# Patient Record
Sex: MALE | Race: WHITE | NOT HISPANIC OR LATINO | Employment: UNEMPLOYED | URBAN - METROPOLITAN AREA
[De-identification: names, ages, dates, MRNs, and addresses within clinical notes are randomized per-mention and may not be internally consistent; named-entity substitution may affect disease eponyms.]

---

## 2017-05-24 ENCOUNTER — HOSPITAL ENCOUNTER (EMERGENCY)
Facility: HOSPITAL | Age: 72
Discharge: HOME | End: 2017-05-24
Attending: EMERGENCY MEDICINE
Payer: MEDICARE

## 2017-05-24 VITALS
OXYGEN SATURATION: 95 % | HEART RATE: 74 BPM | DIASTOLIC BLOOD PRESSURE: 69 MMHG | TEMPERATURE: 97 F | WEIGHT: 240.88 LBS | RESPIRATION RATE: 20 BRPM | SYSTOLIC BLOOD PRESSURE: 144 MMHG

## 2017-05-24 DIAGNOSIS — J44.1 COPD WITH EXACERBATION: Primary | ICD-10-CM

## 2017-05-24 DIAGNOSIS — R06.02 SOB (SHORTNESS OF BREATH): ICD-10-CM

## 2017-05-24 LAB
ANION GAP SERPL CALC-SCNC: 11 MMOL/L
BASOPHILS # BLD AUTO: 0.1 K/UL
BASOPHILS NFR BLD: 2.1 %
BNP SERPL-MCNC: 33 PG/ML
BUN SERPL-MCNC: 12 MG/DL
CALCIUM SERPL-MCNC: 9.3 MG/DL
CHLORIDE SERPL-SCNC: 97 MMOL/L
CO2 SERPL-SCNC: 31 MMOL/L
CREAT SERPL-MCNC: 1.1 MG/DL
DIFFERENTIAL METHOD: ABNORMAL
EOSINOPHIL # BLD AUTO: 0.3 K/UL
EOSINOPHIL NFR BLD: 5.1 %
ERYTHROCYTE [DISTWIDTH] IN BLOOD BY AUTOMATED COUNT: 14.1 %
EST. GFR  (AFRICAN AMERICAN): >60 ML/MIN/1.73 M^2
EST. GFR  (NON AFRICAN AMERICAN): >60 ML/MIN/1.73 M^2
GLUCOSE SERPL-MCNC: 130 MG/DL
HCT VFR BLD AUTO: 46 %
HGB BLD-MCNC: 15.8 G/DL
LYMPHOCYTES # BLD AUTO: 1.4 K/UL
LYMPHOCYTES NFR BLD: 23.3 %
MCH RBC QN AUTO: 31.3 PG
MCHC RBC AUTO-ENTMCNC: 34.3 %
MCV RBC AUTO: 91 FL
MONOCYTES # BLD AUTO: 0.6 K/UL
MONOCYTES NFR BLD: 10.1 %
NEUTROPHILS # BLD AUTO: 3.8 K/UL
NEUTROPHILS NFR BLD: 59.4 %
PLATELET # BLD AUTO: 220 K/UL
PMV BLD AUTO: 7.8 FL
POTASSIUM SERPL-SCNC: 4.1 MMOL/L
RBC # BLD AUTO: 5.05 M/UL
SODIUM SERPL-SCNC: 139 MMOL/L
WBC # BLD AUTO: 6.2 K/UL

## 2017-05-24 PROCEDURE — 25000242 PHARM REV CODE 250 ALT 637 W/ HCPCS: Performed by: EMERGENCY MEDICINE

## 2017-05-24 PROCEDURE — 27000221 HC OXYGEN, UP TO 24 HOURS

## 2017-05-24 PROCEDURE — 94761 N-INVAS EAR/PLS OXIMETRY MLT: CPT

## 2017-05-24 PROCEDURE — 99284 EMERGENCY DEPT VISIT MOD MDM: CPT | Mod: 25

## 2017-05-24 PROCEDURE — 94640 AIRWAY INHALATION TREATMENT: CPT

## 2017-05-24 PROCEDURE — 36415 COLL VENOUS BLD VENIPUNCTURE: CPT

## 2017-05-24 PROCEDURE — 83880 ASSAY OF NATRIURETIC PEPTIDE: CPT

## 2017-05-24 PROCEDURE — 80048 BASIC METABOLIC PNL TOTAL CA: CPT

## 2017-05-24 PROCEDURE — 85025 COMPLETE CBC W/AUTO DIFF WBC: CPT

## 2017-05-24 RX ORDER — AMLODIPINE BESYLATE 10 MG/1
10 TABLET ORAL DAILY
COMMUNITY

## 2017-05-24 RX ORDER — IPRATROPIUM BROMIDE AND ALBUTEROL SULFATE 2.5; .5 MG/3ML; MG/3ML
3 SOLUTION RESPIRATORY (INHALATION) EVERY 6 HOURS PRN
Qty: 25 VIAL | Refills: 2 | Status: SHIPPED | OUTPATIENT
Start: 2017-05-24 | End: 2019-06-07

## 2017-05-24 RX ORDER — IPRATROPIUM BROMIDE AND ALBUTEROL SULFATE 2.5; .5 MG/3ML; MG/3ML
3 SOLUTION RESPIRATORY (INHALATION)
Status: COMPLETED | OUTPATIENT
Start: 2017-05-24 | End: 2017-05-24

## 2017-05-24 RX ORDER — SERTRALINE HYDROCHLORIDE 50 MG/1
50 TABLET, FILM COATED ORAL DAILY
COMMUNITY

## 2017-05-24 RX ORDER — MEMANTINE HYDROCHLORIDE 5 MG/1
5 TABLET ORAL 2 TIMES DAILY
COMMUNITY

## 2017-05-24 RX ORDER — QUETIAPINE FUMARATE 25 MG/1
25 TABLET, FILM COATED ORAL NIGHTLY
COMMUNITY

## 2017-05-24 RX ORDER — DONEPEZIL HYDROCHLORIDE 10 MG/1
10 TABLET, FILM COATED ORAL NIGHTLY
COMMUNITY

## 2017-05-24 RX ORDER — TRAZODONE HYDROCHLORIDE 50 MG/1
50 TABLET ORAL NIGHTLY
COMMUNITY

## 2017-05-24 RX ORDER — FOLIC ACID 1 MG/1
1 TABLET ORAL DAILY
COMMUNITY

## 2017-05-24 RX ORDER — MULTIVITAMIN
1 TABLET ORAL DAILY
COMMUNITY

## 2017-05-24 RX ORDER — TRAMADOL HYDROCHLORIDE 50 MG/1
50 TABLET ORAL EVERY 12 HOURS PRN
COMMUNITY

## 2017-05-24 RX ORDER — TRIAMCINOLONE ACETONIDE 1 MG/G
CREAM TOPICAL 2 TIMES DAILY
COMMUNITY

## 2017-05-24 RX ORDER — LISINOPRIL 40 MG/1
40 TABLET ORAL DAILY
COMMUNITY

## 2017-05-24 RX ORDER — METOPROLOL TARTRATE 25 MG/1
25 TABLET, FILM COATED ORAL 2 TIMES DAILY
COMMUNITY

## 2017-05-24 RX ORDER — LANOLIN ALCOHOL/MO/W.PET/CERES
100 CREAM (GRAM) TOPICAL DAILY
COMMUNITY

## 2017-05-24 RX ORDER — LAMOTRIGINE 25 MG/1
25 TABLET ORAL 2 TIMES DAILY
COMMUNITY

## 2017-05-24 RX ADMIN — IPRATROPIUM BROMIDE AND ALBUTEROL SULFATE 3 ML: .5; 3 SOLUTION RESPIRATORY (INHALATION) at 02:05

## 2017-05-24 NOTE — ED PROVIDER NOTES
Encounter Date: 5/24/2017    SCRIBE #1 NOTE: I, Jagruti Tejeda, am scribing for, and in the presence of, Dr Hines.       History     Chief Complaint   Patient presents with    Shortness of Breath     Review of patient's allergies indicates:  No Known Allergies  05/24/2017  1:55 PM     Chief Complaint: JONAH Pineda is a 72 y.o. male presenting to the E.D. From [facility] with shortness of breath. Son states that assisted living facility reported that the patient's O2 saturation was low this morning. He denies fever.The pt is not on continual O2 and son states pt does not have breathing treatments. HPI limited secondary to pt Dementia. He has a past medical history of Hypertension and dementia.       The history is provided by the patient, the EMS personnel and a relative.     No past medical history on file.  No past surgical history on file.  No family history on file.  Social History   Substance Use Topics    Smoking status: Not on file    Smokeless tobacco: Not on file    Alcohol use Not on file     Review of Systems   Constitutional: Negative for fever.   HENT: Negative.  Negative for sore throat.    Eyes: Negative for visual disturbance.   Respiratory: Positive for shortness of breath. Negative for cough.    Cardiovascular: Negative for chest pain.   Gastrointestinal: Negative for diarrhea, nausea and vomiting.   Genitourinary: Negative for difficulty urinating.   Musculoskeletal: Negative for arthralgias.   Skin: Negative for rash.   Neurological: Negative for weakness.       Physical Exam     Initial Vitals [05/24/17 1342]   BP Pulse Resp Temp SpO2   (!) 189/87 83 (!) 28 97 °F (36.1 °C) (!) 92 %     Physical Exam    Nursing note and vitals reviewed.  Constitutional: He appears well-developed and well-nourished.   HENT:   Head: Normocephalic and atraumatic.   Eyes: Conjunctivae are normal.   Neck: Neck supple.   Cardiovascular: Normal rate, regular rhythm, normal  heart sounds and intact distal pulses. Exam reveals no gallop and no friction rub.    No murmur heard.  Pulmonary/Chest: He has wheezes. He has no rhonchi. He has no rales.   Abdominal: Soft. He exhibits no distension. There is no tenderness.   Musculoskeletal: Normal range of motion.   Neurological: He is alert.   Skin: No rash noted. No erythema.   Psychiatric: He has a normal mood and affect.         ED Course   Procedures  Labs Reviewed   BASIC METABOLIC PANEL - Abnormal; Notable for the following:        Result Value    CO2 31 (*)     Glucose 130 (*)     All other components within normal limits   CBC W/ AUTO DIFFERENTIAL - Abnormal; Notable for the following:     MCH 31.3 (*)     MPV 7.8 (*)     Basophil% 2.1 (*)     All other components within normal limits   B-TYPE NATRIURETIC PEPTIDE             Medical Decision Making:   ED Management:  Angel Pineda is a 72 y.o. male who presents with  shortness of breath.  He has a history of dementia; therefore, history is limited.  Physical exam is strongly suggestive of a COPD exacerbation with no radiographic evidence of pneumonia or congestive heart failure.  He has marked improvement with bronchodilators suggesting a COPD exacerbation.  He is given a prescription for DuoNeb and a home nebulizer.            Scribe Attestation:   Scribe #1: I performed the above scribed service and the documentation accurately describes the services I performed. I attest to the accuracy of the note.    Attending Attestation:           Physician Attestation for Scribe:  Physician Attestation Statement for Scribe #1: I, Dr Hines, reviewed documentation, as scribed by Jagruti ward in my presence, and it is both accurate and complete.                 ED Course     Clinical Impression:   The primary encounter diagnosis was COPD with exacerbation. A diagnosis of SOB (shortness of breath) was also pertinent to this visit.          Mario Hines III, MD  05/24/17 4232

## 2018-06-07 ENCOUNTER — HOSPITAL ENCOUNTER (INPATIENT)
Facility: HOSPITAL | Age: 73
LOS: 12 days | Discharge: HOSPICE | DRG: 871 | End: 2018-06-19
Attending: EMERGENCY MEDICINE | Admitting: INTERNAL MEDICINE
Payer: MEDICARE

## 2018-06-07 DIAGNOSIS — E87.1 HYPONATREMIA: ICD-10-CM

## 2018-06-07 DIAGNOSIS — J96.02 ACUTE RESPIRATORY FAILURE WITH HYPOXIA AND HYPERCAPNIA: ICD-10-CM

## 2018-06-07 DIAGNOSIS — F02.80 ALZHEIMER'S DEMENTIA WITHOUT BEHAVIORAL DISTURBANCE, UNSPECIFIED TIMING OF DEMENTIA ONSET: ICD-10-CM

## 2018-06-07 DIAGNOSIS — N17.9 AKI (ACUTE KIDNEY INJURY): ICD-10-CM

## 2018-06-07 DIAGNOSIS — J96.01 ACUTE HYPOXEMIC RESPIRATORY FAILURE: ICD-10-CM

## 2018-06-07 DIAGNOSIS — J96.01 ACUTE RESPIRATORY FAILURE WITH HYPOXIA AND HYPERCAPNIA: ICD-10-CM

## 2018-06-07 DIAGNOSIS — J18.9 PNEUMONIA OF RIGHT LOWER LOBE DUE TO INFECTIOUS ORGANISM: Primary | ICD-10-CM

## 2018-06-07 DIAGNOSIS — R06.02 SHORTNESS OF BREATH: ICD-10-CM

## 2018-06-07 DIAGNOSIS — R78.81 BACTEREMIA: ICD-10-CM

## 2018-06-07 DIAGNOSIS — J44.0 COPD WITH ACUTE LOWER RESPIRATORY INFECTION: ICD-10-CM

## 2018-06-07 DIAGNOSIS — G30.9 ALZHEIMER'S DEMENTIA WITHOUT BEHAVIORAL DISTURBANCE, UNSPECIFIED TIMING OF DEMENTIA ONSET: ICD-10-CM

## 2018-06-07 DIAGNOSIS — I10 HYPERTENSION, UNSPECIFIED TYPE: ICD-10-CM

## 2018-06-07 DIAGNOSIS — E44.0 MALNUTRITION OF MODERATE DEGREE: ICD-10-CM

## 2018-06-07 LAB
ALBUMIN SERPL BCP-MCNC: 2.4 G/DL
ALLENS TEST: ABNORMAL
ALP SERPL-CCNC: 60 U/L
ALT SERPL W/O P-5'-P-CCNC: 35 U/L
ANION GAP SERPL CALC-SCNC: 16 MMOL/L
AST SERPL-CCNC: 40 U/L
BASOPHILS NFR BLD: 0 %
BILIRUB SERPL-MCNC: 1 MG/DL
BILIRUB UR QL STRIP: ABNORMAL
BNP SERPL-MCNC: 111 PG/ML
BUN SERPL-MCNC: 85 MG/DL
CALCIUM SERPL-MCNC: 9.4 MG/DL
CHLORIDE SERPL-SCNC: 93 MMOL/L
CLARITY UR: CLEAR
CO2 SERPL-SCNC: 24 MMOL/L
COLOR UR: YELLOW
CREAT SERPL-MCNC: 1.9 MG/DL
DELSYS: ABNORMAL
DIFFERENTIAL METHOD: ABNORMAL
DOHLE BOD BLD QL SMEAR: PRESENT
EOSINOPHIL NFR BLD: 0 %
ERYTHROCYTE [DISTWIDTH] IN BLOOD BY AUTOMATED COUNT: 15.1 %
EST. GFR  (AFRICAN AMERICAN): 40 ML/MIN/1.73 M^2
EST. GFR  (NON AFRICAN AMERICAN): 35 ML/MIN/1.73 M^2
FIO2: 80
GLUCOSE SERPL-MCNC: 146 MG/DL
GLUCOSE UR QL STRIP: NEGATIVE
HCO3 UR-SCNC: 31.3 MMOL/L (ref 24–28)
HCT VFR BLD AUTO: 40.4 %
HGB BLD-MCNC: 14 G/DL
HGB UR QL STRIP: ABNORMAL
INR PPP: 1.2
KETONES UR QL STRIP: NEGATIVE
LACTATE SERPL-SCNC: 1.7 MMOL/L
LACTATE SERPL-SCNC: 1.7 MMOL/L
LEUKOCYTE ESTERASE UR QL STRIP: NEGATIVE
LYMPHOCYTES NFR BLD: 4 %
MCH RBC QN AUTO: 31.6 PG
MCHC RBC AUTO-ENTMCNC: 34.7 G/DL
MCV RBC AUTO: 91 FL
METAMYELOCYTES NFR BLD MANUAL: 2 %
MODE: ABNORMAL
MONOCYTES NFR BLD: 3 %
NEUTROPHILS NFR BLD: 86 %
NEUTS BAND NFR BLD MANUAL: 5 %
NITRITE UR QL STRIP: NEGATIVE
PCO2 BLDA: 60.1 MMHG (ref 35–45)
PH SMN: 7.33 [PH] (ref 7.35–7.45)
PH UR STRIP: 6 [PH] (ref 5–8)
PLATELET # BLD AUTO: 231 K/UL
PLATELET BLD QL SMEAR: ABNORMAL
PMV BLD AUTO: 10.3 FL
PO2 BLDA: 95 MMHG (ref 80–100)
POC BE: 5 MMOL/L
POC SATURATED O2: 96 % (ref 95–100)
POC TCO2: 33 MMOL/L (ref 23–27)
POTASSIUM SERPL-SCNC: 4 MMOL/L
PROT SERPL-MCNC: 7.4 G/DL
PROT UR QL STRIP: ABNORMAL
PROTHROMBIN TIME: 12.1 SEC
RBC # BLD AUTO: 4.43 M/UL
SAMPLE: ABNORMAL
SITE: ABNORMAL
SODIUM SERPL-SCNC: 133 MMOL/L
SP GR UR STRIP: 1.02 (ref 1–1.03)
TROPONIN I SERPL DL<=0.01 NG/ML-MCNC: 0.01 NG/ML
URN SPEC COLLECT METH UR: ABNORMAL
UROBILINOGEN UR STRIP-ACNC: 1 EU/DL
WBC # BLD AUTO: 12 K/UL

## 2018-06-07 PROCEDURE — 85027 COMPLETE CBC AUTOMATED: CPT

## 2018-06-07 PROCEDURE — 93005 ELECTROCARDIOGRAM TRACING: CPT

## 2018-06-07 PROCEDURE — 83605 ASSAY OF LACTIC ACID: CPT | Mod: 91

## 2018-06-07 PROCEDURE — 87077 CULTURE AEROBIC IDENTIFY: CPT

## 2018-06-07 PROCEDURE — 83880 ASSAY OF NATRIURETIC PEPTIDE: CPT

## 2018-06-07 PROCEDURE — 25000003 PHARM REV CODE 250: Performed by: INTERNAL MEDICINE

## 2018-06-07 PROCEDURE — 36415 COLL VENOUS BLD VENIPUNCTURE: CPT

## 2018-06-07 PROCEDURE — 85007 BL SMEAR W/DIFF WBC COUNT: CPT

## 2018-06-07 PROCEDURE — 96365 THER/PROPH/DIAG IV INF INIT: CPT

## 2018-06-07 PROCEDURE — 94640 AIRWAY INHALATION TREATMENT: CPT

## 2018-06-07 PROCEDURE — 84484 ASSAY OF TROPONIN QUANT: CPT

## 2018-06-07 PROCEDURE — 63600175 PHARM REV CODE 636 W HCPCS: Performed by: EMERGENCY MEDICINE

## 2018-06-07 PROCEDURE — 63600175 PHARM REV CODE 636 W HCPCS: Performed by: INTERNAL MEDICINE

## 2018-06-07 PROCEDURE — 81003 URINALYSIS AUTO W/O SCOPE: CPT

## 2018-06-07 PROCEDURE — 82803 BLOOD GASES ANY COMBINATION: CPT

## 2018-06-07 PROCEDURE — 25000003 PHARM REV CODE 250: Performed by: EMERGENCY MEDICINE

## 2018-06-07 PROCEDURE — 99285 EMERGENCY DEPT VISIT HI MDM: CPT | Mod: 25

## 2018-06-07 PROCEDURE — 36600 WITHDRAWAL OF ARTERIAL BLOOD: CPT

## 2018-06-07 PROCEDURE — 80053 COMPREHEN METABOLIC PANEL: CPT

## 2018-06-07 PROCEDURE — 99223 1ST HOSP IP/OBS HIGH 75: CPT | Mod: ,,, | Performed by: INTERNAL MEDICINE

## 2018-06-07 PROCEDURE — 99900035 HC TECH TIME PER 15 MIN (STAT)

## 2018-06-07 PROCEDURE — 96375 TX/PRO/DX INJ NEW DRUG ADDON: CPT | Mod: 59

## 2018-06-07 PROCEDURE — 20000000 HC ICU ROOM

## 2018-06-07 PROCEDURE — 93010 ELECTROCARDIOGRAM REPORT: CPT | Mod: ,,, | Performed by: INTERNAL MEDICINE

## 2018-06-07 PROCEDURE — 85610 PROTHROMBIN TIME: CPT

## 2018-06-07 PROCEDURE — 87040 BLOOD CULTURE FOR BACTERIA: CPT | Mod: 59

## 2018-06-07 PROCEDURE — 87186 SC STD MICRODIL/AGAR DIL: CPT

## 2018-06-07 PROCEDURE — 51702 INSERT TEMP BLADDER CATH: CPT

## 2018-06-07 PROCEDURE — 25000242 PHARM REV CODE 250 ALT 637 W/ HCPCS: Performed by: EMERGENCY MEDICINE

## 2018-06-07 RX ORDER — SODIUM CHLORIDE 9 MG/ML
INJECTION, SOLUTION INTRAVENOUS CONTINUOUS
Status: DISCONTINUED | OUTPATIENT
Start: 2018-06-07 | End: 2018-06-07

## 2018-06-07 RX ORDER — SODIUM CHLORIDE 9 MG/ML
INJECTION, SOLUTION INTRAVENOUS CONTINUOUS
Status: DISCONTINUED | OUTPATIENT
Start: 2018-06-07 | End: 2018-06-10

## 2018-06-07 RX ORDER — FOLIC ACID 1 MG/1
1 TABLET ORAL DAILY
Status: DISCONTINUED | OUTPATIENT
Start: 2018-06-08 | End: 2018-06-19 | Stop reason: HOSPADM

## 2018-06-07 RX ORDER — VANCOMYCIN HCL IN 5 % DEXTROSE 1G/250ML
1000 PLASTIC BAG, INJECTION (ML) INTRAVENOUS
Status: DISCONTINUED | OUTPATIENT
Start: 2018-06-07 | End: 2018-06-07

## 2018-06-07 RX ORDER — ENOXAPARIN SODIUM 100 MG/ML
40 INJECTION SUBCUTANEOUS EVERY 24 HOURS
Status: DISCONTINUED | OUTPATIENT
Start: 2018-06-07 | End: 2018-06-19 | Stop reason: HOSPADM

## 2018-06-07 RX ORDER — PANTOPRAZOLE SODIUM 40 MG/10ML
40 INJECTION, POWDER, LYOPHILIZED, FOR SOLUTION INTRAVENOUS DAILY
Status: DISCONTINUED | OUTPATIENT
Start: 2018-06-08 | End: 2018-06-19 | Stop reason: HOSPADM

## 2018-06-07 RX ORDER — ALBUTEROL SULFATE 2.5 MG/.5ML
5 SOLUTION RESPIRATORY (INHALATION)
Status: COMPLETED | OUTPATIENT
Start: 2018-06-07 | End: 2018-06-07

## 2018-06-07 RX ORDER — LOPERAMIDE HYDROCHLORIDE 2 MG/1
2 CAPSULE ORAL 2 TIMES DAILY PRN
COMMUNITY

## 2018-06-07 RX ORDER — ACETAMINOPHEN 325 MG/1
650 TABLET ORAL EVERY 6 HOURS PRN
Status: DISCONTINUED | OUTPATIENT
Start: 2018-06-07 | End: 2018-06-19 | Stop reason: HOSPADM

## 2018-06-07 RX ORDER — LAMOTRIGINE 25 MG/1
25 TABLET ORAL 2 TIMES DAILY
Status: DISCONTINUED | OUTPATIENT
Start: 2018-06-07 | End: 2018-06-19 | Stop reason: HOSPADM

## 2018-06-07 RX ORDER — TRAMADOL HYDROCHLORIDE 50 MG/1
50 TABLET ORAL EVERY 12 HOURS PRN
Status: DISCONTINUED | OUTPATIENT
Start: 2018-06-07 | End: 2018-06-19 | Stop reason: HOSPADM

## 2018-06-07 RX ORDER — ONDANSETRON 2 MG/ML
4 INJECTION INTRAMUSCULAR; INTRAVENOUS EVERY 8 HOURS PRN
Status: DISCONTINUED | OUTPATIENT
Start: 2018-06-07 | End: 2018-06-19 | Stop reason: HOSPADM

## 2018-06-07 RX ORDER — DONEPEZIL HYDROCHLORIDE 5 MG/1
10 TABLET, FILM COATED ORAL NIGHTLY
Status: DISCONTINUED | OUTPATIENT
Start: 2018-06-07 | End: 2018-06-19 | Stop reason: HOSPADM

## 2018-06-07 RX ORDER — GUAIFENESIN 600 MG/1
600 TABLET, EXTENDED RELEASE ORAL 2 TIMES DAILY
Status: ON HOLD | COMMUNITY
Start: 2018-06-05 | End: 2018-06-19

## 2018-06-07 RX ORDER — AMOXICILLIN 250 MG
1 CAPSULE ORAL DAILY PRN
Status: DISCONTINUED | OUTPATIENT
Start: 2018-06-07 | End: 2018-06-19 | Stop reason: HOSPADM

## 2018-06-07 RX ORDER — DOXYCYCLINE HYCLATE 100 MG
100 TABLET ORAL 2 TIMES DAILY
Status: ON HOLD | COMMUNITY
Start: 2018-06-05 | End: 2018-06-19

## 2018-06-07 RX ADMIN — AZITHROMYCIN MONOHYDRATE 500 MG: 500 INJECTION, POWDER, LYOPHILIZED, FOR SOLUTION INTRAVENOUS at 03:06

## 2018-06-07 RX ADMIN — ENOXAPARIN SODIUM 40 MG: 100 INJECTION SUBCUTANEOUS at 07:06

## 2018-06-07 RX ADMIN — SODIUM CHLORIDE: 0.9 INJECTION, SOLUTION INTRAVENOUS at 07:06

## 2018-06-07 RX ADMIN — PIPERACILLIN AND TAZOBACTAM 3.38 G: 3; .375 INJECTION, POWDER, LYOPHILIZED, FOR SOLUTION INTRAVENOUS; PARENTERAL at 10:06

## 2018-06-07 RX ADMIN — PIPERACILLIN SODIUM AND TAZOBACTAM SODIUM 4.5 G: 4; .5 INJECTION, POWDER, FOR SOLUTION INTRAVENOUS at 03:06

## 2018-06-07 RX ADMIN — ALBUTEROL SULFATE 5 MG: 2.5 SOLUTION RESPIRATORY (INHALATION) at 02:06

## 2018-06-07 RX ADMIN — VANCOMYCIN HYDROCHLORIDE 2000 MG: 1 INJECTION, POWDER, LYOPHILIZED, FOR SOLUTION INTRAVENOUS at 03:06

## 2018-06-07 NOTE — ED PROVIDER NOTES
Encounter Date: 6/7/2018    SCRIBE #1 NOTE: I, Michell Colmenares, am scribing for, and in the presence of, .       History   No chief complaint on file.      06/07/2018 2:01 PM     Chief complaint: shortness of breath      Angel Pineda is a 73 y.o. male with HTN, CHF who presents to the ED via EMS for shortness of breath. Patient is a resident of [facility] dementia unit, who called the ambulance for shortness of breath. Patient is unable to give history, HPI and ROS largely unobtainable.       The history is provided by the EMS personnel. No  was used.     Review of patient's allergies indicates:  No Known Allergies  Past Medical History:   Diagnosis Date    Hypertension      Past Surgical History:   Procedure Laterality Date    dementia       No family history on file.  Social History   Substance Use Topics    Smoking status: Not on file    Smokeless tobacco: Not on file    Alcohol use Not on file     Review of Systems   Unable to perform ROS: Dementia       Physical Exam     Initial Vitals [06/07/18 1400]   BP Pulse Resp Temp SpO2   (!) 175/74 (!) 118 (!) 32 -- --      MAP       107.67         Physical Exam    Nursing note and vitals reviewed.  Constitutional: He appears well-developed. He is not diaphoretic.  Non-toxic appearance. He does not have a sickly appearance. He does not appear ill. He appears distressed (mild).   Smells strongly of urine.    HENT:   Head: Normocephalic and atraumatic.   Eyes: EOM are normal.   Neck: Normal range of motion. Neck supple. Normal range of motion present. No neck rigidity. No JVD present.   Cardiovascular: Normal rate, regular rhythm and normal heart sounds. Exam reveals no gallop and no friction rub.    No murmur heard.  Pulmonary/Chest: He is in respiratory distress (mild). He has no wheezes. He has no rhonchi. He has rales.   Musculoskeletal: Normal range of motion.   No lower extremity edema.    Neurological: He is alert.  "  Skin: Skin is warm and dry. No rash noted.         ED Course   Procedures  Labs Reviewed - No data to display       No orders to display        Medical Decision Making:   History:   Old Medical Records: I decided to obtain old medical records.  Clinical Tests:   Lab Tests: Ordered and Reviewed  Radiological Study: Ordered and Reviewed  Medical Tests: Ordered and Reviewed            Scribe Attestation:   Scribe #1: I performed the above scribed service and the documentation accurately describes the services I performed. I attest to the accuracy of the note.    I, Dr. Navarrete, personally performed the services described in this documentation. All medical record entries made by the scribe were at my direction and in my presence.  I have reviewed the chart and agree that the record reflects my personal performance and is accurate and complete.4:55 PM 06/07/2018            ED Course as of Jun 07 1653   Thu Jun 07, 2018   1411 BP: (!) 175/74 [EF]   1411 Temp src: Oral [EF]   1411 Pulse: (!) 118 [EF]   1411 Resp: (!) 32 [EF]   1425 Sepsis considered, bundle added on.  Chest x-ray appears to show a right-sided pneumonia.  [EF]   1442 Leukocytes, UA: Negative [EF]   1442 Nitrite, UA: Negative [EF]   1449 X-Ray Chest AP Portable [EF]   1457 POC PH: (!) 7.325 [EF]   1457 POC PCO2: (!!) 60.1 [EF]   1549 Dr. Washington of [facility] to admit  [EF]   1555 No distress in ER, do not think needs bipap or intubation, son reports at this time full code although he says I haven't really thought about it."  [EF]      ED Course User Index  [EF] Clive aNvarrete MD     Clinical Impression:   The encounter diagnosis was Shortness of breath.            73-year-old man with a history of dementia presents with increased respiratory rate and heart rate, no history from the patient is obtainable.  Mild distress on arrival which improved with an albuterol neb.  Patient meets sepsis criteria, appears to have a right-sided pneumonia.  No " hypotension in the ER, no indication at this time for intubation although if the patient continues to deteriorate this might become necessary.  Respiratory rate has improved after a breathing treatment.  I did discuss code status with the son who reports that he has not really considered this yet although he is the power of .                 Clive Navarrete MD  06/07/18 7999

## 2018-06-07 NOTE — CONSULTS
Date: 6/7/2018   Angel Pineda [id number] is a 73 y.o. male who has been consulted for vancomycin dosing.    The patient has the following labs:     Creatinine (mg/dl)    WBC Count   Serum creatinine: 1.9 mg/dL (H) 06/07/18 1502  Estimated creatinine clearance: 45.7 mL/min (A) Lab Results   Component Value Date    WBC 12.00 06/07/2018        Current weight is 106.6 kg (235 lb)      Vancomycin being given for pneumonia.    Pt's renal function is not stable.  Pharmacy will dose by level.  Vanc 2000 mg given initially 6/7/18 at 1522.  Target goal is 15-20 mg/dL.   A vancomycin level will be ordered on 6/8/18 at 1500.        Patient will be followed by pharmacy for changes in renal function, toxicity, and efficacy.    Thank you for allowing us to participate in this patient's care.     Vicente Louis, Pharmacist

## 2018-06-07 NOTE — PROGRESS NOTES
1700: Patient arrived from the ED via gurney, update report received from Ricardo Fatima RN. Patient transferred to ICU bed with x3 max assist. Patient has non-re-breather on. Patient is confused.   1720: Bilat soft wrist restraints placed on patient due to patient pulling on lines and interfering with treatment.   1730: spoke with son Yaron Zambrano. Patient was on Hospice at the assisted living facility. Patient is full code at this time and son stated he is going to think about things and may make patient DRN with hospice care again.

## 2018-06-07 NOTE — H&P
"PCP: Primary Doctor No    History & Physical    Chief Complaint: Shortness of breath    History of Present Illness:  Patient is a 73 y.o. male admitted to Hospitalist Service from [facility] with complaint of SOB. Patient reportedly has past medical history significant for hypertension and dementia. Patient is a resident of [facility]. Further detail of HPI are not available. Patient's son is at bedside who is POA. He reports, patient had some diarrhea for 1-2 days but no fever. He was started on some antibiotics 2 days ago.     Past Medical History:   Diagnosis Date    Hypertension      Past Surgical History:   Procedure Laterality Date    dementia       History reviewed. No pertinent family history.  Social History   Substance Use Topics    Smoking status: Not on file    Smokeless tobacco: Not on file    Alcohol use Not on file      Review of patient's allergies indicates:  No Known Allergies    (Not in a hospital admission)  Review of Systems: Unable to provide meaningful ROS due to dementia     OBJECTIVE:     Vital Signs (Most Recent)  Pulse: (!) 114 (06/07/18 1610)  Resp: 20 (06/07/18 1456)  BP: (!) 121/58 (06/07/18 1601)  SpO2: (!) 89 % (06/07/18 1610)    Physical Exam:  General appearance: well developed, appears stated age, smells strongly of urine, repeating "B,B,B"  Head: normocephalic, atraumatic  Eyes:  conjunctivae/corneas clear. PERRL.  Nose: Nares normal. Septum midline.  Throat: lips, mucosa, and tongue normal; teeth and gums normal, no throat erythema.  Neck: supple, symmetrical, trachea midline, no JVD and thyroid not enlarged, symmetric, no tenderness/mass/nodules  Lungs:  clear to auscultation bilaterally and normal respiratory effort  Chest wall: no tenderness  Heart: regular rate and rhythm, S1, S2 normal, no murmur, click, rub or gallop  Abdomen: soft, non-tender non-distented; bowel sounds normal; no masses,  no organomegaly  Extremities: no cyanosis, " clubbing or edema.   Pulses: 2+ and symmetric  Skin: Skin color, texture, turgor normal. No rashes or lesions.  Lymph nodes: Cervical, supraclavicular, and axillary nodes normal.  Neurologic: Advanced dementia, does not communicate, keeps repeating B-B-B    Laboratory:   CBC:   Recent Labs  Lab 06/07/18  1438   WBC 12.00   RBC 4.43*   HGB 14.0   HCT 40.4      MCV 91   MCH 31.6*   MCHC 34.7     CMP:   Recent Labs  Lab 06/07/18  1502   *   CALCIUM 9.4   ALBUMIN 2.4*   PROT 7.4   *   K 4.0   CO2 24   CL 93*   BUN 85*   CREATININE 1.9*   ALKPHOS 60   ALT 35   AST 40   BILITOT 1.0     Coagulation:   Recent Labs  Lab 06/07/18  1502   LABPROT 12.1   INR 1.2     Cardiac markers:   Recent Labs  Lab 06/07/18  1502   TROPONINI 0.012     Microbiology Results (last 7 days)     Procedure Component Value Units Date/Time    Blood culture [[id number]] Collected:  06/07/18 1502    Order Status:  Sent Specimen:  Blood Updated:  06/07/18 1503    Blood culture [[id number]] Collected:  06/07/18 1501    Order Status:  Sent Specimen:  Blood Updated:  06/07/18 1501          Recent Labs  Lab 06/07/18  1423   COLORU Yellow   SPECGRAV 1.020   PHUR 6.0   PROTEINUA Trace*   NITRITE Negative   LEUKOCYTESUR Negative   UROBILINOGEN 1.0     Microbiology Results (last 7 days)     Procedure Component Value Units Date/Time    Blood culture [[id number]] Collected:  06/07/18 1502    Order Status:  Sent Specimen:  Blood Updated:  06/07/18 1503    Blood culture [[id number]] Collected:  06/07/18 1501    Order Status:  Sent Specimen:  Blood Updated:  06/07/18 1501        Diagnostic Results:  Chest X-Ray: Right lung predominant interstitial changes may represent asymmetric pulmonary edema or an atypical infectious process.    Assessment/Plan:     Active Hospital Problems    Diagnosis  POA    *Acute hypoxemic respiratory failure [J96.01]  Yes    Pneumonia of right lower lobe due to infectious organism [J18.1]  Admit to ICU.  Supplemental O2  via nasal canula; titrate O2 saturation to >92%. Use BiPAP as needed.  Continue beta 2 agonist bronchodilator treatments.   Continue IV antibiotics - Vancomycin and Zosyn. Pharmacist to dose Vancomycin.  Check sputum GS and Cx.   Continue routine medications as before.     Yes    Malnutrition of moderate degree [E44.0]  Nutrition consulted. Encourage maximal PO intake. Diet supplementation ordered per nutrition approval. Will encourage PO and monitor closely for weight changes.    Yes    ANÍBAL (acute kidney injury) [N17.9]  Continue IVF hydration. Follow BMP.    Yes    Hyponatremia [E87.1]  Continue normal saline infusion. Follow BMP.    Yes    Hypertension [I10]  Hold anti-HTN agent for now. Tele-monitoring.    Dementia  Dementia is controlled currently. Continue home dementia meds and non-pharmacologic interventions to prevent delirium (No VS between 11PM-5AM, activity during day, opening blinds, providing glasses/hearing aids, and up in chair during daytime). Use PRN anti-psychotics to prevent behavior of self harm during sundowning, and avoid narcotics and benzos unless absolutely necessary. PRN anti-psychotics prescribed to avoid self harm behaviors. On Aricept and Namenda.  Yes      DVT prophylaxis: Lovenox 40 mg SQ q day.    Son confirms full code for now until he will discuss with his brother.    Aston Washington MD  Department of Hospital Medicine   [facility]

## 2018-06-08 PROBLEM — J96.02 ACUTE RESPIRATORY FAILURE WITH HYPOXIA AND HYPERCAPNIA: Status: ACTIVE | Noted: 2018-06-07

## 2018-06-08 PROBLEM — G30.9 ALZHEIMER'S DISEASE: Status: ACTIVE | Noted: 2018-06-08

## 2018-06-08 PROBLEM — F02.80 ALZHEIMER'S DISEASE: Status: ACTIVE | Noted: 2018-06-08

## 2018-06-08 PROBLEM — J44.0 COPD WITH ACUTE LOWER RESPIRATORY INFECTION: Status: ACTIVE | Noted: 2018-06-08

## 2018-06-08 LAB
ALBUMIN SERPL BCP-MCNC: 2.1 G/DL
ALLENS TEST: ABNORMAL
ALLENS TEST: ABNORMAL
ALP SERPL-CCNC: 61 U/L
ALT SERPL W/O P-5'-P-CCNC: 31 U/L
ANION GAP SERPL CALC-SCNC: 13 MMOL/L
ANISOCYTOSIS BLD QL SMEAR: SLIGHT
AST SERPL-CCNC: 49 U/L
BASOPHILS # BLD AUTO: ABNORMAL K/UL
BASOPHILS NFR BLD: 0 %
BILIRUB SERPL-MCNC: 0.8 MG/DL
BUN SERPL-MCNC: 98 MG/DL
CALCIUM SERPL-MCNC: 8.7 MG/DL
CHLORIDE SERPL-SCNC: 96 MMOL/L
CO2 SERPL-SCNC: 28 MMOL/L
CREAT SERPL-MCNC: 1.9 MG/DL
DELSYS: ABNORMAL
DELSYS: ABNORMAL
DIFFERENTIAL METHOD: ABNORMAL
EOSINOPHIL # BLD AUTO: ABNORMAL K/UL
EOSINOPHIL NFR BLD: 0 %
EP: 8
ERYTHROCYTE [DISTWIDTH] IN BLOOD BY AUTOMATED COUNT: 15.2 %
ERYTHROCYTE [SEDIMENTATION RATE] IN BLOOD BY WESTERGREN METHOD: 12 MM/H
EST. GFR  (AFRICAN AMERICAN): 40 ML/MIN/1.73 M^2
EST. GFR  (NON AFRICAN AMERICAN): 35 ML/MIN/1.73 M^2
FLOW: 15
GLUCOSE SERPL-MCNC: 145 MG/DL
HCO3 UR-SCNC: 29.5 MMOL/L (ref 24–28)
HCO3 UR-SCNC: 30.2 MMOL/L (ref 24–28)
HCT VFR BLD AUTO: 37.7 %
HGB BLD-MCNC: 12.5 G/DL
IP: 18
LYMPHOCYTES # BLD AUTO: ABNORMAL K/UL
LYMPHOCYTES NFR BLD: 6 %
MCH RBC QN AUTO: 31.1 PG
MCHC RBC AUTO-ENTMCNC: 33.1 G/DL
MCV RBC AUTO: 94 FL
MODE: ABNORMAL
MODE: ABNORMAL
MONOCYTES # BLD AUTO: ABNORMAL K/UL
MONOCYTES NFR BLD: 5 %
NEUTROPHILS NFR BLD: 69 %
NEUTS BAND NFR BLD MANUAL: 20 %
OVALOCYTES BLD QL SMEAR: ABNORMAL
PCO2 BLDA: 61.9 MMHG (ref 35–45)
PCO2 BLDA: 74.2 MMHG (ref 35–45)
PH SMN: 7.22 [PH] (ref 7.35–7.45)
PH SMN: 7.29 [PH] (ref 7.35–7.45)
PLATELET # BLD AUTO: 197 K/UL
PLATELET BLD QL SMEAR: ABNORMAL
PMV BLD AUTO: 8.8 FL
PO2 BLDA: 66 MMHG (ref 80–100)
PO2 BLDA: 86 MMHG (ref 80–100)
POC BE: 2 MMOL/L
POC BE: 3 MMOL/L
POC SATURATED O2: 89 % (ref 95–100)
POC SATURATED O2: 94 % (ref 95–100)
POC TCO2: 31 MMOL/L (ref 23–27)
POC TCO2: 32 MMOL/L (ref 23–27)
POIKILOCYTOSIS BLD QL SMEAR: SLIGHT
POLYCHROMASIA BLD QL SMEAR: ABNORMAL
POTASSIUM SERPL-SCNC: 4.1 MMOL/L
PROT SERPL-MCNC: 6.7 G/DL
RBC # BLD AUTO: 4.01 M/UL
SAMPLE: ABNORMAL
SAMPLE: ABNORMAL
SITE: ABNORMAL
SITE: ABNORMAL
SODIUM SERPL-SCNC: 137 MMOL/L
SP02: 97
VANCOMYCIN SERPL-MCNC: 10.8 UG/ML
WBC # BLD AUTO: 13.4 K/UL

## 2018-06-08 PROCEDURE — 94761 N-INVAS EAR/PLS OXIMETRY MLT: CPT

## 2018-06-08 PROCEDURE — 27000221 HC OXYGEN, UP TO 24 HOURS

## 2018-06-08 PROCEDURE — 20000000 HC ICU ROOM

## 2018-06-08 PROCEDURE — 85007 BL SMEAR W/DIFF WBC COUNT: CPT

## 2018-06-08 PROCEDURE — C9113 INJ PANTOPRAZOLE SODIUM, VIA: HCPCS | Performed by: INTERNAL MEDICINE

## 2018-06-08 PROCEDURE — 94660 CPAP INITIATION&MGMT: CPT

## 2018-06-08 PROCEDURE — 80053 COMPREHEN METABOLIC PANEL: CPT

## 2018-06-08 PROCEDURE — 36415 COLL VENOUS BLD VENIPUNCTURE: CPT

## 2018-06-08 PROCEDURE — 99223 1ST HOSP IP/OBS HIGH 75: CPT | Mod: ,,, | Performed by: INTERNAL MEDICINE

## 2018-06-08 PROCEDURE — 94640 AIRWAY INHALATION TREATMENT: CPT

## 2018-06-08 PROCEDURE — 25000242 PHARM REV CODE 250 ALT 637 W/ HCPCS: Performed by: INTERNAL MEDICINE

## 2018-06-08 PROCEDURE — 36600 WITHDRAWAL OF ARTERIAL BLOOD: CPT

## 2018-06-08 PROCEDURE — 27000190 HC CPAP FULL FACE MASK W/VALVE

## 2018-06-08 PROCEDURE — 99900035 HC TECH TIME PER 15 MIN (STAT)

## 2018-06-08 PROCEDURE — 80202 ASSAY OF VANCOMYCIN: CPT

## 2018-06-08 PROCEDURE — 82803 BLOOD GASES ANY COMBINATION: CPT

## 2018-06-08 PROCEDURE — 85027 COMPLETE CBC AUTOMATED: CPT

## 2018-06-08 PROCEDURE — 25000003 PHARM REV CODE 250: Performed by: INTERNAL MEDICINE

## 2018-06-08 PROCEDURE — 63600175 PHARM REV CODE 636 W HCPCS: Performed by: INTERNAL MEDICINE

## 2018-06-08 PROCEDURE — 99233 SBSQ HOSP IP/OBS HIGH 50: CPT | Mod: ,,, | Performed by: INTERNAL MEDICINE

## 2018-06-08 RX ORDER — VANCOMYCIN HCL IN 5 % DEXTROSE 1G/250ML
1000 PLASTIC BAG, INJECTION (ML) INTRAVENOUS
Status: DISCONTINUED | OUTPATIENT
Start: 2018-06-08 | End: 2018-06-08

## 2018-06-08 RX ORDER — IPRATROPIUM BROMIDE AND ALBUTEROL SULFATE 2.5; .5 MG/3ML; MG/3ML
3 SOLUTION RESPIRATORY (INHALATION) EVERY 6 HOURS
Status: DISCONTINUED | OUTPATIENT
Start: 2018-06-08 | End: 2018-06-19 | Stop reason: HOSPADM

## 2018-06-08 RX ADMIN — ENOXAPARIN SODIUM 40 MG: 100 INJECTION SUBCUTANEOUS at 05:06

## 2018-06-08 RX ADMIN — PIPERACILLIN AND TAZOBACTAM 3.38 G: 3; .375 INJECTION, POWDER, LYOPHILIZED, FOR SOLUTION INTRAVENOUS; PARENTERAL at 10:06

## 2018-06-08 RX ADMIN — SODIUM CHLORIDE: 0.9 INJECTION, SOLUTION INTRAVENOUS at 09:06

## 2018-06-08 RX ADMIN — VANCOMYCIN HYDROCHLORIDE 1750 MG: 750 INJECTION, POWDER, LYOPHILIZED, FOR SOLUTION INTRAVENOUS at 03:06

## 2018-06-08 RX ADMIN — PIPERACILLIN AND TAZOBACTAM 3.38 G: 3; .375 INJECTION, POWDER, LYOPHILIZED, FOR SOLUTION INTRAVENOUS; PARENTERAL at 06:06

## 2018-06-08 RX ADMIN — LAMOTRIGINE 25 MG: 25 TABLET ORAL at 09:06

## 2018-06-08 RX ADMIN — PIPERACILLIN AND TAZOBACTAM 3.38 G: 3; .375 INJECTION, POWDER, LYOPHILIZED, FOR SOLUTION INTRAVENOUS; PARENTERAL at 03:06

## 2018-06-08 RX ADMIN — IPRATROPIUM BROMIDE AND ALBUTEROL SULFATE 3 ML: .5; 3 SOLUTION RESPIRATORY (INHALATION) at 07:06

## 2018-06-08 RX ADMIN — SODIUM CHLORIDE: 0.9 INJECTION, SOLUTION INTRAVENOUS at 06:06

## 2018-06-08 RX ADMIN — DONEPEZIL HYDROCHLORIDE 10 MG: 5 TABLET, FILM COATED ORAL at 09:06

## 2018-06-08 RX ADMIN — PANTOPRAZOLE SODIUM 40 MG: 40 INJECTION, POWDER, LYOPHILIZED, FOR SOLUTION INTRAVENOUS at 08:06

## 2018-06-08 NOTE — CONSULTS
"  06/08/2018      Admit Date: 6/7/2018  Angel Pineda  New Patient Consult    Chief Complaint   Patient presents with    Shortness of Breath     incontinent of urine / [place] resident        History of Present Illness:   Notified of consult when I arrived on unit withing last 30 minutes. Pt cannot supplement history but perseberates "my mouth". He is alert and awake in NIV mask , but not responding appropriately to questions.    Record review indicates presented to er 2017 with sob and wheezes felt to be due to copd.      Dr Landon WEEKS HPI : 06/07/2018 2:01 PM    Chief complaint: shortness of breath   Angel Pineda is a 73 y.o. male with HTN, CHF who presents to the ED via EMS for shortness of breath. Patient is a resident of [facility] unit, who called the ambulance for shortness of breath. Patient is unable to give history, HPI and ROS largely unobtainable.    The history is provided by the EMS personnel. No  was used.     PFSH:  Past Medical History:   Diagnosis Date    CHF (congestive heart failure)     COPD (chronic obstructive pulmonary disease)     Dementia     Hypertension      Past Surgical History:   Procedure Laterality Date    dementia       Social History   Substance Use Topics    Smoking status: Unknown If Ever Smoked    Smokeless tobacco: Not on file    Alcohol use No     History reviewed. No pertinent family history.  Review of patient's allergies indicates:  No Known Allergies    Performance Status:Performance Status:The patient's activity level is    Review of Systems:  due to neurologic status/impairments a Review of Systems could not be obtained - dementia      Exam:Comprehensive exam done. /61   Pulse 94   Temp 98.1 °F (36.7 °C) (Axillary)   Resp (!) 32   Ht 6' 1" (1.854 m)   Wt 108.5 kg (239 lb 3.2 oz)   SpO2 95%   BMI 31.56 kg/m²   Exam included Vitals as listed, and patient's appearance and affect and alertness and mood, " oral exam for yeast and hygiene and pharynx lesions and Mallapatti (M) score, neck with inspection for jvd and masses and thyroid abnormalities and lymph nodes (supraclavicular and infraclavicular nodes also examined and noted if abn), chest exam included symmetry and effort and fremitus and percussion and auscultation, cardiac exam included rhythm and gallops and murmur and rubs and jvd and edema, abdominal exam for mass and hepatosplenomegaly and tenderness and hernias and bowel sounds, Musculoskeletal exam with muscle tone and posture and mobility/gait and  strenght, and skin for rashes and cyanosis and pallor and turgor, extremity for clubbing.  Findings were normal except as listed below:  M4?, dry mouth, teeht intact- hard to inspect. No jvd, no edema, chest symmetric, min distress on 70% 02NIV, nl fremitus and percussion, min rale right, RRR, no mrurmur or gallop or edema or jvd.  No hs megaly (obese), no abd mass, no clubbing , moves all 4, no deformity    Radiographs reviewed: view by direct vision extensive rll infiltrate  Results for orders placed during the hospital encounter of 06/07/18   X-Ray Chest 1 View    Narrative EXAMINATION:  XR CHEST 1 VIEW    CLINICAL HISTORY:  low o2 sat;    TECHNIQUE:  Single frontal view of the chest was performed.    COMPARISON:  Chest of June 7, 2018.    FINDINGS:  The cardiac size is within normal limits.  Calcification is noted in the aorta.  There is increased density in the right lower lobe infiltrate consistent with pneumonia and possible small pleural effusion.  The left lung is relatively clear.  No pneumothorax is seen.      Impression Increasing density of right lower lobe infiltrate and probable pleural effusion.  Atherosclerosis.      Electronically signed by: Juan Collier MD  Date:    06/08/2018  Time:    10:51   ]    Labs       Recent Labs  Lab 06/08/18  0312   WBC 13.40*   HGB 12.5*   HCT 37.7*      BAND 20.0       Recent Labs  Lab  06/07/18  1808 06/08/18  0312   NA  --  137   K  --  4.1   CL  --  96   CO2  --  28   BUN  --  98*   CREATININE  --  1.9*   GLU  --  145*   CALCIUM  --  8.7   AST  --  49*   ALT  --  31   ALKPHOS  --  61   BILITOT  --  0.8   PROT  --  6.7   ALBUMIN  --  2.1*   LACTATE 1.7  --        Recent Labs  Lab 06/08/18  1340   PH 7.286*   PCO2 61.9*   PO2 66*   HCO3 29.5*     Microbiology Results (last 7 days)     Procedure Component Value Units Date/Time    Culture, Respiratory with Gram Stain [[id number]]     Order Status:  No result Specimen:  Respiratory from Tracheal Aspirate     Blood culture [[id number]] Collected:  06/07/18 1502    Order Status:  Completed Specimen:  Blood Updated:  06/08/18 1500     Blood Culture, Routine Gram stain peds bottle: Gram positive cocci in chains resembling Strep     Blood Culture, Routine Results called to and read back by: Juan Canales RN 06/08/2018  14:59    Narrative:       Aerobic and anaerobic    Blood culture [[id number]] Collected:  06/07/18 1501    Order Status:  Completed Specimen:  Blood Updated:  06/08/18 0345     Blood Culture, Routine No Growth to date    Narrative:       Aerobic and anaerobic    Culture, Respiratory with Gram Stain [[id number]]     Order Status:  Canceled Specimen:  Respiratory           Impression:  Active Hospital Problems    Diagnosis  POA    *Acute respiratory failure with hypoxia and hypercapnia [J96.01, J96.02]  Yes    Alzheimer's disease [G30.9, F02.80]  Yes    COPD with acute lower respiratory infection [J44.0]  Yes    Pneumonia of right lower lobe due to infectious organism [J18.1]  Yes    Malnutrition of moderate degree [E44.0]  Yes    ANÍBAL (acute kidney injury) [N17.9]  Yes    Hyponatremia [E87.1]  Yes    Hypertension [I10]  Yes      Resolved Hospital Problems    Diagnosis Date Resolved POA   No resolved problems to display.     Plan:   June 8, would rx copd, needs speech eval, needs npo for now, very marginal and poor outlook.  Needs nt  suction.

## 2018-06-08 NOTE — PLAN OF CARE
"Problem: Patient Care Overview  Goal: Plan of Care Review  Outcome: Ongoing (interventions implemented as appropriate)  Pt transitioned from NRB to bipap today. He was nonverbal this am, but this evening he would say "I don't feel good." No other response received to questions asked to pt. He's remained free from falls/injuries this shift. Family updated on pt's condition and need for continued restraints.      "

## 2018-06-08 NOTE — PLAN OF CARE
0912- I called the pt's son, Yaron for Dr Washington to discuss plan of care and pt's condition. I did confirm with Yaron that he has the pt's MPOA and asked to bring a copy to the hospital when he can. Yaron also states that the pt is a resident at [facility] and has [facility].   Dr Washington spoke with Yaron who will be at the hospital in 45 mins. We will meet him in ICU when he gets here to discuss the plan of care.   9194- I spoke with Alyssa (ph#[phone]) at [facility]; she states they have been providing services for the pt for almost a year. The pt is not a DNR, he is a FULL Code. Alyssa states that the pt ambulates and performs all ADLs independently. He is suppose to wear oxygen but is not compliant with wearing it. They are willing to resume services when the pt is discharged.   0072- Spoke with the pt's son, Yaron along with Dr Washington at the pt's bedside regarding pt's condition and plan of care. The pt is a FULL CODE at this time.....Inessa Colin       06/08/18 1964   Discharge Assessment   Assessment Type Discharge Planning Assessment   Confirmed/corrected address and phone number on facesheet? Yes

## 2018-06-08 NOTE — PLAN OF CARE
Problem: Patient Care Overview  Goal: Plan of Care Review  Outcome: Ongoing (interventions implemented as appropriate)  Son at bedside at beginning of shift, questions answered. Pt remains confused. Non-rebreather in place. Restraints in place, reiterated with son reason for soft wrist restraints, verbalized understanding.

## 2018-06-08 NOTE — PROGRESS NOTES
Progress Note  Hospital Medicine  Patient Name:Angel Pineda  MRN:  [id number]  Patient Class: IP- Inpatient  Admit Date: 6/7/2018  Length of Stay: 1 days  Expected Discharge Date:   Attending Physician: Aston Washington MD  Primary Care Provider:  Primary Doctor No    SUBJECTIVE:     Principal Problem: Acute hypoxemic respiratory failure  Initial history of present illness: Patient is a 73 y.o. male admitted to Hospitalist Service from [facility] with complaint of SOB. Patient reportedly has past medical history significant for hypertension and dementia. Patient is a resident of [facility]. Further detail of HPI are not available. Patient's son is at bedside who is POA. He reports, patient had some diarrhea for 1-2 days but no fever. He was started on some antibiotics 2 days ago.     PMH/PSH/SH/FH/Meds: reviewed.    Symptoms/Review of Systems: Patient is lethargic, hypoxic and getting 100 % NRB.  Diet:  NPO  Activity level: Up with assistance  Pain:  NAD    OBJECTIVE:   Vital Signs (Most Recent):      Temp: 98.3 °F (36.8 °C) (06/08/18 0317)  Pulse: 92 (06/08/18 0400)  Resp: 17 (06/08/18 0400)  BP: (!) 111/59 (06/08/18 0400)  SpO2: (!) 93 % (06/08/18 0400)       Vital Signs Range (Last 24H):  Temp:  [98.3 °F (36.8 °C)-100 °F (37.8 °C)]   Pulse:  []   Resp:  [17-32]   BP: (109-175)/(51-74)   SpO2:  [89 %-96 %]     Weight: 108.5 kg (239 lb 3.2 oz)  Body mass index is 31.56 kg/m².    Intake/Output Summary (Last 24 hours) at 06/08/18 0511  Last data filed at 06/08/18 0000   Gross per 24 hour   Intake             1500 ml   Output              425 ml   Net             1075 ml     Physical Examination:  General appearance: well developed, appears stated age, On VM  Head: normocephalic, atraumatic  Eyes:  conjunctivae/corneas clear. PERRL.  Nose: Nares normal. Septum midline.  Throat: lips, mucosa, and tongue normal; teeth and gums normal, no throat erythema.  Neck: supple, symmetrical,  trachea midline, no JVD and thyroid not enlarged, symmetric, no tenderness/mass/nodules  Lungs:  clear to auscultation bilaterally and normal respiratory effort  Chest wall: no tenderness  Heart: regular rate and rhythm, S1, S2 normal, no murmur, click, rub or gallop  Abdomen: soft, non-tender non-distented; bowel sounds normal; no masses,  no organomegaly  Extremities: no cyanosis, clubbing or edema.   Pulses: 2+ and symmetric  Skin: Skin color, texture, turgor normal. No rashes or lesions.  Lymph nodes: Cervical, supraclavicular, and axillary nodes normal.  Neurologic: Advanced dementia, lethargic and unable to respond verbal commands    CBC:    Recent Labs  Lab 06/07/18  1438 06/08/18  0312   WBC 12.00 13.40*   RBC 4.43* 4.01*   HGB 14.0 12.5*   HCT 40.4 37.7*    197   MCV 91 94   MCH 31.6* 31.1*   MCHC 34.7 33.1   BMP    Recent Labs  Lab 06/07/18  1502 06/08/18  0312   * 145*   * 137   K 4.0 4.1   CL 93* 96   CO2 24 28   BUN 85* 98*   CREATININE 1.9* 1.9*   CALCIUM 9.4 8.7      Diagnostic Results:  Microbiology Results (last 7 days)     Procedure Component Value Units Date/Time    Blood culture [[id number]] Collected:  06/07/18 1501    Order Status:  Completed Specimen:  Blood Updated:  06/08/18 0345     Blood Culture, Routine No Growth to date    Narrative:       Aerobic and anaerobic    Blood culture [[id number]] Collected:  06/07/18 1502    Order Status:  Completed Specimen:  Blood Updated:  06/08/18 0345     Blood Culture, Routine No Growth to date    Narrative:       Aerobic and anaerobic    Culture, Respiratory with Gram Stain [[id number]]     Order Status:  No result Specimen:  Respiratory          Chest X-Ray: Right lung predominant interstitial changes may represent asymmetric pulmonary edema or an atypical infectious process.    Assessment/Plan:      *Acute hypoxemic and hypercarbic respiratory failure [J96.01]   Yes    Pneumonia of right lower lobe due to infectious organism  [J18.1]  Supplemental O2 via nasal canula; titrate O2 saturation to >92%. Use BiPAP now. Check ABG in 4 hrs. Consult Dr. Douglas from pulmonary medicine.   Continue beta 2 agonist bronchodilator treatments.   Continue IV antibiotics - Vancomycin and Zosyn. Pharmacist to dose Vancomycin.  Check sputum GS and Cx.   Continue routine medications as before. Lactate WNL.      Yes    Malnutrition of moderate degree [E44.0]  Encourage maximal PO intake. Diet supplementation ordered per nutrition approval. Will encourage PO and monitor closely for weight changes.      Yes    ANÍBAL (acute kidney injury) [N17.9]  Continue IVF hydration. Follow BMP.      Yes    Hyponatremia [E87.1] - Improving  Continue normal saline infusion. Follow BMP.      Yes    Hypertension [I10]  Hold anti-HTN agent for now. Tele-monitoring.     Dementia  Dementia is controlled currently. Continue home dementia meds and non-pharmacologic interventions to prevent delirium (No VS between 11PM-5AM, activity during day, opening blinds, providing glasses/hearing aids, and up in chair during daytime). Use PRN anti-psychotics to prevent behavior of self harm during sundowning, and avoid narcotics and benzos unless absolutely necessary. PRN anti-psychotics prescribed to avoid self harm behaviors. On Aricept and Namenda.   Yes       DVT prophylaxis: Lovenox 40 mg SQ q day.    Discussed with patient's son and daughter in law, they would patient to be full code for now. They are aware guarded prognosis.    VTE Risk Mitigation         Ordered     enoxaparin injection 40 mg  Daily      06/07/18 1720     IP VTE HIGH RISK PATIENT  Once      06/07/18 1720     Place AME hose  Until discontinued      06/07/18 1720     Place sequential compression device  Until discontinued      06/07/18 1720        Aston Washington MD  Department of Hospital Medicine   [facility]

## 2018-06-09 PROBLEM — F03.918 DEMENTIA WITH BEHAVIORAL DISTURBANCE: Status: ACTIVE | Noted: 2018-06-09

## 2018-06-09 LAB
ALBUMIN SERPL BCP-MCNC: 1.9 G/DL
ALLENS TEST: ABNORMAL
ALP SERPL-CCNC: 76 U/L
ALT SERPL W/O P-5'-P-CCNC: 39 U/L
ANION GAP SERPL CALC-SCNC: 13 MMOL/L
ANISOCYTOSIS BLD QL SMEAR: SLIGHT
AST SERPL-CCNC: 66 U/L
BASOPHILS # BLD AUTO: ABNORMAL K/UL
BASOPHILS NFR BLD: 0 %
BILIRUB SERPL-MCNC: 0.7 MG/DL
BUN SERPL-MCNC: 93 MG/DL
CALCIUM SERPL-MCNC: 8.7 MG/DL
CHLORIDE SERPL-SCNC: 105 MMOL/L
CO2 SERPL-SCNC: 27 MMOL/L
CREAT SERPL-MCNC: 1.4 MG/DL
DELSYS: ABNORMAL
DIFFERENTIAL METHOD: ABNORMAL
EOSINOPHIL # BLD AUTO: ABNORMAL K/UL
EOSINOPHIL NFR BLD: 0 %
ERYTHROCYTE [DISTWIDTH] IN BLOOD BY AUTOMATED COUNT: 15.7 %
EST. GFR  (AFRICAN AMERICAN): 58 ML/MIN/1.73 M^2
EST. GFR  (NON AFRICAN AMERICAN): 50 ML/MIN/1.73 M^2
GLUCOSE SERPL-MCNC: 147 MG/DL
HCO3 UR-SCNC: 29.6 MMOL/L (ref 24–28)
HCT VFR BLD AUTO: 37.2 %
HGB BLD-MCNC: 12.2 G/DL
LYMPHOCYTES # BLD AUTO: ABNORMAL K/UL
LYMPHOCYTES NFR BLD: 2 %
MCH RBC QN AUTO: 30.6 PG
MCHC RBC AUTO-ENTMCNC: 32.8 G/DL
MCV RBC AUTO: 93 FL
METAMYELOCYTES NFR BLD MANUAL: 3 %
MODE: ABNORMAL
MONOCYTES # BLD AUTO: ABNORMAL K/UL
MONOCYTES NFR BLD: 3 %
NEUTROPHILS NFR BLD: 81 %
NEUTS BAND NFR BLD MANUAL: 11 %
OVALOCYTES BLD QL SMEAR: ABNORMAL
PCO2 BLDA: 52.7 MMHG (ref 35–45)
PH SMN: 7.36 [PH] (ref 7.35–7.45)
PLATELET # BLD AUTO: 204 K/UL
PLATELET BLD QL SMEAR: ABNORMAL
PMV BLD AUTO: 8.9 FL
PO2 BLDA: 76 MMHG (ref 80–100)
POC BE: 4 MMOL/L
POC SATURATED O2: 94 % (ref 95–100)
POC TCO2: 31 MMOL/L (ref 23–27)
POIKILOCYTOSIS BLD QL SMEAR: SLIGHT
POLYCHROMASIA BLD QL SMEAR: ABNORMAL
POTASSIUM SERPL-SCNC: 4 MMOL/L
PROT SERPL-MCNC: 6.6 G/DL
RBC # BLD AUTO: 3.98 M/UL
SAMPLE: ABNORMAL
SITE: ABNORMAL
SODIUM SERPL-SCNC: 145 MMOL/L
VANCOMYCIN TROUGH SERPL-MCNC: 12 UG/ML
WBC # BLD AUTO: 17 K/UL

## 2018-06-09 PROCEDURE — 36415 COLL VENOUS BLD VENIPUNCTURE: CPT

## 2018-06-09 PROCEDURE — 87186 SC STD MICRODIL/AGAR DIL: CPT

## 2018-06-09 PROCEDURE — 87070 CULTURE OTHR SPECIMN AEROBIC: CPT

## 2018-06-09 PROCEDURE — 36600 WITHDRAWAL OF ARTERIAL BLOOD: CPT

## 2018-06-09 PROCEDURE — 94640 AIRWAY INHALATION TREATMENT: CPT

## 2018-06-09 PROCEDURE — 92610 EVALUATE SWALLOWING FUNCTION: CPT

## 2018-06-09 PROCEDURE — 25000003 PHARM REV CODE 250: Performed by: INTERNAL MEDICINE

## 2018-06-09 PROCEDURE — 94660 CPAP INITIATION&MGMT: CPT

## 2018-06-09 PROCEDURE — 85007 BL SMEAR W/DIFF WBC COUNT: CPT

## 2018-06-09 PROCEDURE — 87077 CULTURE AEROBIC IDENTIFY: CPT

## 2018-06-09 PROCEDURE — 20000000 HC ICU ROOM

## 2018-06-09 PROCEDURE — G8997 SWALLOW GOAL STATUS: HCPCS | Mod: CJ

## 2018-06-09 PROCEDURE — 82803 BLOOD GASES ANY COMBINATION: CPT

## 2018-06-09 PROCEDURE — 85027 COMPLETE CBC AUTOMATED: CPT

## 2018-06-09 PROCEDURE — 63600175 PHARM REV CODE 636 W HCPCS: Performed by: INTERNAL MEDICINE

## 2018-06-09 PROCEDURE — 25000242 PHARM REV CODE 250 ALT 637 W/ HCPCS: Performed by: INTERNAL MEDICINE

## 2018-06-09 PROCEDURE — 87106 FUNGI IDENTIFICATION YEAST: CPT

## 2018-06-09 PROCEDURE — 99900026 HC AIRWAY MAINTENANCE (STAT)

## 2018-06-09 PROCEDURE — 99900035 HC TECH TIME PER 15 MIN (STAT)

## 2018-06-09 PROCEDURE — G8996 SWALLOW CURRENT STATUS: HCPCS | Mod: CM

## 2018-06-09 PROCEDURE — 94761 N-INVAS EAR/PLS OXIMETRY MLT: CPT

## 2018-06-09 PROCEDURE — 87205 SMEAR GRAM STAIN: CPT

## 2018-06-09 PROCEDURE — 80053 COMPREHEN METABOLIC PANEL: CPT

## 2018-06-09 PROCEDURE — 31720 CLEARANCE OF AIRWAYS: CPT

## 2018-06-09 PROCEDURE — 99232 SBSQ HOSP IP/OBS MODERATE 35: CPT | Mod: ,,, | Performed by: INTERNAL MEDICINE

## 2018-06-09 PROCEDURE — 27000221 HC OXYGEN, UP TO 24 HOURS

## 2018-06-09 PROCEDURE — C9113 INJ PANTOPRAZOLE SODIUM, VIA: HCPCS | Performed by: INTERNAL MEDICINE

## 2018-06-09 PROCEDURE — 80202 ASSAY OF VANCOMYCIN: CPT

## 2018-06-09 RX ADMIN — SODIUM CHLORIDE: 0.9 INJECTION, SOLUTION INTRAVENOUS at 06:06

## 2018-06-09 RX ADMIN — ENOXAPARIN SODIUM 40 MG: 100 INJECTION SUBCUTANEOUS at 05:06

## 2018-06-09 RX ADMIN — SODIUM CHLORIDE: 0.9 INJECTION, SOLUTION INTRAVENOUS at 11:06

## 2018-06-09 RX ADMIN — LAMOTRIGINE 25 MG: 25 TABLET ORAL at 08:06

## 2018-06-09 RX ADMIN — PIPERACILLIN AND TAZOBACTAM 3.38 G: 3; .375 INJECTION, POWDER, LYOPHILIZED, FOR SOLUTION INTRAVENOUS; PARENTERAL at 06:06

## 2018-06-09 RX ADMIN — VANCOMYCIN HYDROCHLORIDE 1750 MG: 750 INJECTION, POWDER, LYOPHILIZED, FOR SOLUTION INTRAVENOUS at 05:06

## 2018-06-09 RX ADMIN — IPRATROPIUM BROMIDE AND ALBUTEROL SULFATE 3 ML: .5; 3 SOLUTION RESPIRATORY (INHALATION) at 07:06

## 2018-06-09 RX ADMIN — PIPERACILLIN AND TAZOBACTAM 3.38 G: 3; .375 INJECTION, POWDER, LYOPHILIZED, FOR SOLUTION INTRAVENOUS; PARENTERAL at 03:06

## 2018-06-09 RX ADMIN — DONEPEZIL HYDROCHLORIDE 10 MG: 5 TABLET, FILM COATED ORAL at 09:06

## 2018-06-09 RX ADMIN — PIPERACILLIN AND TAZOBACTAM 3.38 G: 3; .375 INJECTION, POWDER, LYOPHILIZED, FOR SOLUTION INTRAVENOUS; PARENTERAL at 11:06

## 2018-06-09 RX ADMIN — PANTOPRAZOLE SODIUM 40 MG: 40 INJECTION, POWDER, LYOPHILIZED, FOR SOLUTION INTRAVENOUS at 08:06

## 2018-06-09 RX ADMIN — FOLIC ACID 1 MG: 1 TABLET ORAL at 08:06

## 2018-06-09 RX ADMIN — TRAMADOL HYDROCHLORIDE 50 MG: 50 TABLET, FILM COATED ORAL at 08:06

## 2018-06-09 RX ADMIN — IPRATROPIUM BROMIDE AND ALBUTEROL SULFATE 3 ML: .5; 3 SOLUTION RESPIRATORY (INHALATION) at 12:06

## 2018-06-09 RX ADMIN — LAMOTRIGINE 25 MG: 25 TABLET ORAL at 09:06

## 2018-06-09 RX ADMIN — IPRATROPIUM BROMIDE AND ALBUTEROL SULFATE 3 ML: .5; 3 SOLUTION RESPIRATORY (INHALATION) at 01:06

## 2018-06-09 NOTE — CONSULTS
Angel Pineda [id number] is a 73 y.o. male who has been consulted for vancomycin dosing.    The patient has the following labs:     Date Creatinine (mg/dl)    BUN WBC Count   6/9/2018 Estimated Creatinine Clearance: 62.5 mL/min (based on SCr of 1.4 mg/dL). Lab Results   Component Value Date    BUN 93 (H) 06/09/2018     Lab Results   Component Value Date    WBC 17.00 (H) 06/09/2018        Current weight is 108.5 kg (239 lb 3.2 oz)    Pt is receiving vancomycin 1750  mg every 24 hours.  Vancomycin trough from 6/9/18 at 1533 was 12 mg/dL.  Target trough range is 15-20 mg/dL.   Trough was drawn on time and anticipate it is subtherapeutic. Patient will continued on current regimen. Vancomycin trough has been ordered prior to 3 dose on 6/11/18  at 1530.      Patient will be followed by pharmacy for changes in renal function, toxicity, and efficacy.  Thank you for allowing us to participate in this patient's care.     Jessica Dougherty, PharmD.  06/09/2018

## 2018-06-09 NOTE — ASSESSMENT & PLAN NOTE
Supplemental oxygen per bipap  Continue current setting of bipap  contineu antibiotics. Pharmacy to dose vanc  F/u sputum culture  abg in am

## 2018-06-09 NOTE — HPI
Patient is a 73 y.o. male admitted to Hospitalist Service from [facility] Room with complaint of SOB. Patient reportedly has past medical history significant for hypertension and dementia. Patient is a resident of [facility]. Further detail of HPI are not available. Patient's son is at bedside who is POA. He reports, patient had some diarrhea for 1-2 days but no fever. He was started on some antibiotics 2 days ago

## 2018-06-09 NOTE — PLAN OF CARE
Problem: SLP Goal  Goal: SLP Goal  1. Participate in ongoing assessment of swallow function to determine least restrictive means of nutrition/hydration  Outcome: Ongoing (interventions implemented as appropriate)  BSS completed; recommend NPO with ice chips. Need orders for evaluation and treat to continue assessment/appropriateness for PO intake

## 2018-06-09 NOTE — PLAN OF CARE
Problem: Patient Care Overview  Goal: Plan of Care Review  Son called on the phone, discussed his fathers current status and plan of care; asked questions; understands plan

## 2018-06-09 NOTE — PROGRESS NOTES
Progress Note  PULMONARY    Admit Date: 6/7/2018 06/09/2018      SUBJECTIVE:     June 9, perseverates , on niv,        PFSH and Allergies reviewed.    OBJECTIVE:     Vitals (Most recent):  Vitals:    06/09/18 1200   BP: (!) 159/72   Pulse:    Resp:    Temp:        Vitals (24 hour range):  Temp:  [97.5 °F (36.4 °C)-98.8 °F (37.1 °C)]   Pulse:  []   Resp:  [16-35]   BP: (104-162)/(55-80)   SpO2:  [88 %-97 %]       Intake/Output Summary (Last 24 hours) at 06/09/18 1255  Last data filed at 06/09/18 0745   Gross per 24 hour   Intake          4770.42 ml   Output             2125 ml   Net          2645.42 ml          Physical Exam:  The patient's neuro status (alertness,orientation,cognitive function,motor skills,), pharyngeal exam (oral lesions, hygiene, abn dentition,), Neck (jvd,mass,thyroid,nodes in neck and above/below clavicle),RESPIRATORY(symmetry,effort,fremitus,percussion,auscultation),  Cor(rhythm,heart tones including gallops,perfusion,edema)ABD(distention,hepatic&splenomegaly,tenderness,masses), Skin(rash,cyanosis),Psyc(affect,judgement,).  Exam negative except for these pertinent findings:    Alert but demented and confused, on niv, chest is symmetric, no distress, normal percussion, normal fremitus and good normal breath sounds  No edema, cor rrr, no murmur     Radiographs reviewed: view by direct vision  - impressive right lung infiltrate c/w aspiration pneumonia  Results for orders placed during the hospital encounter of 06/07/18   X-Ray Chest 1 View    Narrative EXAMINATION:  XR CHEST 1 VIEW    CLINICAL HISTORY:  low o2 sat;    TECHNIQUE:  Single frontal view of the chest was performed.    COMPARISON:  Chest of June 7, 2018.    FINDINGS:  The cardiac size is within normal limits.  Calcification is noted in the aorta.  There is increased density in the right lower lobe infiltrate consistent with pneumonia and possible small pleural effusion.  The left lung is relatively clear.  No pneumothorax is  seen.      Impression Increasing density of right lower lobe infiltrate and probable pleural effusion.  Atherosclerosis.      Electronically signed by: Juan Collier MD  Date:    06/08/2018  Time:    10:51   ]    Labs       Recent Labs  Lab 06/09/18  0321   WBC 17.00*   HGB 12.2*   HCT 37.2*      BAND 11.0   METAMYELOCYT 3.0     Recent Labs  Lab 06/09/18  0321      K 4.0      CO2 27   BUN 93*   CREATININE 1.4   *   CALCIUM 8.7   AST 66*   ALT 39   ALKPHOS 76   BILITOT 0.7   PROT 6.6   ALBUMIN 1.9*     Recent Labs  Lab 06/09/18  0739   PH 7.357   PCO2 52.7*   PO2 76*   HCO3 29.6*     Microbiology Results (last 7 days)     Procedure Component Value Units Date/Time    Culture, Respiratory with Gram Stain [[id number]] Collected:  06/09/18 0453    Order Status:  Completed Specimen:  Respiratory from Tracheal Aspirate Updated:  06/09/18 1228     Gram Stain (Respiratory) <10 epithelial cells per low power field.     Gram Stain (Respiratory) Rare WBC's     Gram Stain (Respiratory) No organisms seen    Blood culture [[id number]] Collected:  06/07/18 1502    Order Status:  Completed Specimen:  Blood Updated:  06/09/18 0938     Blood Culture, Routine Gram stain peds bottle: Gram positive cocci in chains resembling Strep     Blood Culture, Routine Results called to and read back by: Juan Canales RN 06/08/2018  14:59     Blood Culture, Routine --     STREPTOCOCCUS SPECIES  Identification pending  For susceptibility see order # [id number]      Narrative:       Aerobic and anaerobic    Blood culture [[id number]] Collected:  06/07/18 1501    Order Status:  Completed Specimen:  Blood Updated:  06/09/18 0936     Blood Culture, Routine Gram stain peds bottle: Gram positive cocci in chains resembling Strep       Blood Culture, Routine Positive results previously called 06/08/2018  18:18     Blood Culture, Routine --     STREPTOCOCCUS SPECIES  Identification and susceptibility pending      Narrative:        Aerobic and anaerobic    Culture, Respiratory with Gram Stain [[id number]]     Order Status:  Canceled Specimen:  Respiratory           Impression:  Active Hospital Problems    Diagnosis  POA    *Acute respiratory failure with hypoxia and hypercapnia [J96.01, J96.02]  Yes    Alzheimer's disease [G30.9, F02.80]  Yes    COPD with acute lower respiratory infection [J44.0]  Yes    Pneumonia of right lower lobe due to infectious organism [J18.1]  Yes    Malnutrition of moderate degree [E44.0]  Yes    ANÍBAL (acute kidney injury) [N17.9]  Yes    Hyponatremia [E87.1]  Yes    Hypertension [I10]  Yes      Resolved Hospital Problems    Diagnosis Date Resolved POA   No resolved problems to display.     Plan:   June 9, expect aspiration, still too unstable to get off bipap.  [place] remains poor.  Cont abx and observe.                                      .

## 2018-06-09 NOTE — SUBJECTIVE & OBJECTIVE
Interval History: patient asking for home pills this am.  Not able to verbalize any other complaint. ABG showed some mild improvement in CO2 as well as pH.      Review of Systems   Unable to perform ROS: Dementia     Objective:     Vital Signs (Most Recent):  Temp: 99.1 °F (37.3 °C) (06/09/18 1615)  Pulse: 101 (06/09/18 1715)  Resp: (!) 29 (06/09/18 1715)  BP: (!) 159/73 (06/09/18 1700)  SpO2: 96 % (06/09/18 1715) Vital Signs (24h Range):  Temp:  [97.5 °F (36.4 °C)-99.1 °F (37.3 °C)] 99.1 °F (37.3 °C)  Pulse:  [] 101  Resp:  [14-35] 29  SpO2:  [88 %-97 %] 96 %  BP: (104-162)/(55-80) 159/73     Weight: 108.5 kg (239 lb 3.2 oz)  Body mass index is 31.56 kg/m².    Intake/Output Summary (Last 24 hours) at 06/09/18 1739  Last data filed at 06/09/18 1700   Gross per 24 hour   Intake          4770.42 ml   Output             3125 ml   Net          1645.42 ml      Physical Exam   Constitutional: He appears well-developed and well-nourished. He appears distressed.   HENT:   Head: Normocephalic and atraumatic.   Nose: Nose normal.   Mouth/Throat: No oropharyngeal exudate.   Eyes: Conjunctivae are normal. Pupils are equal, round, and reactive to light. Right eye exhibits no discharge. Left eye exhibits no discharge. No scleral icterus.   Neck: Normal range of motion. Neck supple. No tracheal deviation present. No thyromegaly present.   Cardiovascular: Normal rate, normal heart sounds and intact distal pulses.  Exam reveals no gallop and no friction rub.    No murmur heard.  tachycardic   Pulmonary/Chest: He is in respiratory distress. He has wheezes. He has rales.   On bipap   Abdominal: Soft. Bowel sounds are normal. He exhibits no distension and no mass. There is no tenderness. There is no rebound and no guarding.   Skin: He is not diaphoretic.       Significant Labs: All pertinent labs within the past 24 hours have been reviewed.    Significant Imaging: I have reviewed and interpreted all pertinent imaging  results/findings within the past 24 hours.

## 2018-06-09 NOTE — ASSESSMENT & PLAN NOTE
In setting of bipap dependence will hold beta blocker and home lisinopril in setting of ANÍBAL. Will continue Tele-monitoring.

## 2018-06-09 NOTE — PT/OT/SLP EVAL
"Speech Language Pathology Evaluation  Bedside Swallow    Patient Name:  Angel Pineda   MRN:  [id number]  Admitting Diagnosis: Acute respiratory failure with hypoxia and hypercapnia    Recommendations:                 General Recommendations:  Dysphagia therapy  Diet recommendations:  NPO, NPO ((ice chips only))   Aspiration Precautions: Strict aspiration precautions   General Precautions: Standard, aspiration    History:     Past Medical History:   Diagnosis Date    CHF (congestive heart failure)     COPD (chronic obstructive pulmonary disease)     Dementia     Hypertension        Past Surgical History:   Procedure Laterality Date    dementia         Social History: Patient lives in [facility] (dementia unit)    Prior Intubation HX:  None this admit     Modified Barium Swallow: None in EPIC    Chest X-Rays: 6/9/18: "There is continued demonstration of hazy opacity in the right mid and lower lung zone and there is patchy segmental opacity at the left lung base..  There has been no interval detrimental change in the radiographic appearance of the chest as compared to the previous examination.  No pneumothorax"    Prior diet: Regular/thin    Subjective     "Coffee right there"- Pt perseverated on phrase t/o session; no other spontaneous or purposeful verbalizations     Pain/Comfort:  Pain Rating 1:  (unable to provide on numerical scale)    Objective:     Oral Musculature Evaluation  Oral Musculature: unable to assess due to poor participation/comprehension  Mucosal Quality: dry  Oral Labial Strength and Mobility:  (Unable to assess 2/2 poor comprehension. Adequate labial seal on spoon during PO trials. )  Lingual Strength and Mobility:  (Unable to assess 2/2 poor comprehension.)  Volitional Cough:  (Unable to assess 2/2 poor comprehension.)  Volitional Swallow:  (Unable to assess 2/2 poor comprehension)  Voice Prior to PO Intake:  (Clear; average intensity)    Bedside Swallow Eval:   Consistencies " Assessed:  · Thin liquids via icechipx2; tspx2; cup sipx1  · Nectar thick liquids single cupx2  · Puree tspx2      Oral Phase:   · Adequate oral containment across boluses    Pharyngeal Phase:   · No overt s/s of aspiration or changes in vocal quality in response to ice chip or puree trials however, Pt with immediate wet cough t/o remaining trials of thin liquids via tsp and cup sip as well as nectar thickened liquid.     Treatment: Family at bedside t/o evaluation; educated on s/s observed as well as recommendation to allow ice chips at this time w/ ongoing assessment of swallow function.     Assessment:     Angel Pineda is a 73 y.o. male with an SLP diagnosis of s/s of pharyngeal dysphagia. Recommend NPO, ice chips allowed, with ongoing assessment of swallow function.     Goals:    SLP Goals        Problem: SLP Goal    Goal Priority Disciplines Outcome   SLP Goal     SLP Ongoing (interventions implemented as appropriate)   Description:  1. Participate in ongoing assessment of swallow function to determine least restrictive means of nutrition/hydration                    Plan:     · Patient to be seen:  5 x/week   · Plan of Care expires:  06/16/18  · Plan of Care reviewed with:  patient   · SLP Follow-Up:  Yes       Discharge recommendations:  nursing facility, basic     Time Tracking:     SLP Treatment Date:   06/09/18  Speech Start Time:  1105  Speech Stop Time:  1116     Speech Total Time (min):  11 min    Billable Minutes: Eval Swallow and Oral Function 11 and Total Time 11    ST Domi  06/09/2018

## 2018-06-09 NOTE — PLAN OF CARE
Problem: Patient Care Overview  Goal: Plan of Care Review  Outcome: Ongoing (interventions implemented as appropriate)  Pt has remained on bipap this shift. SLP done- npo x ice chips and pureed w/ meds recommended. He's remained free from falls/injuries this shift.

## 2018-06-09 NOTE — ASSESSMENT & PLAN NOTE
Dementia is uncontrolled. Continue home dementia meds and non-pharmacologic interventions to prevent delirium (No VS between 11PM-5AM, activity during day, opening blinds, providing glasses/hearing aids, and up in chair during daytime). Use PRN anti-psychotics to prevent behavior of self harm during sundowning, and avoid narcotics and benzos unless absolutely necessary. PRN anti-psychotics prescribed to avoid self harm behaviors.   continue Aricept and Namenda.  Requiring restraints due to interference of medical equipment as he is currently bipap dependent

## 2018-06-09 NOTE — PROGRESS NOTES
[facility] Hospital Medicine  Progress Note    Patient Name: Angel Pineda  MRN: [id number]  Patient Class: IP- Inpatient   Admission Date: 6/7/2018  Length of Stay: 2 days  Attending Physician: Aston Washington MD  Primary Care Provider: Primary Doctor No        Subjective:     Principal Problem:Acute respiratory failure with hypoxia and hypercapnia    HPI:  Patient is a 73 y.o. male admitted to Hospitalist Service from [facility] Room with complaint of SOB. Patient reportedly has past medical history significant for hypertension and dementia. Patient is a resident of [facility]. Further detail of HPI are not available. Patient's son is at bedside who is POA. He reports, patient had some diarrhea for 1-2 days but no fever. He was started on some antibiotics 2 days ago    Hospital Course:  No notes on file    Interval History: patient asking for home pills this am.  Not able to verbalize any other complaint. ABG showed some mild improvement in CO2 as well as pH.      Review of Systems   Unable to perform ROS: Dementia     Objective:     Vital Signs (Most Recent):  Temp: 99.1 °F (37.3 °C) (06/09/18 1615)  Pulse: 101 (06/09/18 1715)  Resp: (!) 29 (06/09/18 1715)  BP: (!) 159/73 (06/09/18 1700)  SpO2: 96 % (06/09/18 1715) Vital Signs (24h Range):  Temp:  [97.5 °F (36.4 °C)-99.1 °F (37.3 °C)] 99.1 °F (37.3 °C)  Pulse:  [] 101  Resp:  [14-35] 29  SpO2:  [88 %-97 %] 96 %  BP: (104-162)/(55-80) 159/73     Weight: 108.5 kg (239 lb 3.2 oz)  Body mass index is 31.56 kg/m².    Intake/Output Summary (Last 24 hours) at 06/09/18 1739  Last data filed at 06/09/18 1700   Gross per 24 hour   Intake          4770.42 ml   Output             3125 ml   Net          1645.42 ml      Physical Exam   Constitutional: He appears well-developed and well-nourished. He appears distressed.   HENT:   Head: Normocephalic and atraumatic.   Nose: Nose normal.   Mouth/Throat: No oropharyngeal  exudate.   Eyes: Conjunctivae are normal. Pupils are equal, round, and reactive to light. Right eye exhibits no discharge. Left eye exhibits no discharge. No scleral icterus.   Neck: Normal range of motion. Neck supple. No tracheal deviation present. No thyromegaly present.   Cardiovascular: Normal rate, normal heart sounds and intact distal pulses.  Exam reveals no gallop and no friction rub.    No murmur heard.  tachycardic   Pulmonary/Chest: He is in respiratory distress. He has wheezes. He has rales.   On bipap   Abdominal: Soft. Bowel sounds are normal. He exhibits no distension and no mass. There is no tenderness. There is no rebound and no guarding.   Skin: He is not diaphoretic.       Significant Labs: All pertinent labs within the past 24 hours have been reviewed.    Significant Imaging: I have reviewed and interpreted all pertinent imaging results/findings within the past 24 hours.    Assessment/Plan:      * Acute respiratory failure with hypoxia and hypercapnia      Supplemental oxygen per bipap  Continue current setting of bipap  contineu antibiotics. Pharmacy to dose vanc  F/u sputum culture  abg in am          Dementia with behavioral disturbance    Dementia is uncontrolled. Continue home dementia meds and non-pharmacologic interventions to prevent delirium (No VS between 11PM-5AM, activity during day, opening blinds, providing glasses/hearing aids, and up in chair during daytime). Use PRN anti-psychotics to prevent behavior of self harm during sundowning, and avoid narcotics and benzos unless absolutely necessary. PRN anti-psychotics prescribed to avoid self harm behaviors.   continue Aricept and Namenda.  Requiring restraints due to interference of medical equipment as he is currently bipap dependent        Hypertension    In setting of bipap dependence will hold beta blocker and home lisinopril in setting of ANÍBAL. Will continue Tele-monitoring.        Hyponatremia      Likely dehydration, resolved  with ivf hydration  Will continue to monitor        ANÍBAL (acute kidney injury)      Continue IVF hydration. Follow BMP.        Malnutrition of moderate degree    Will consult nutrition  NGT in place  May need D5 with saline if sugars are low        Pneumonia of right lower lobe due to infectious organism      As seen on xray and leukocytosis  Continue antibiotics  F/u sputum culture          VTE Risk Mitigation         Ordered     enoxaparin injection 40 mg  Daily      06/07/18 1720     IP VTE HIGH RISK PATIENT  Once      06/07/18 1720     Place AME hose  Until discontinued      06/07/18 1720     Place sequential compression device  Until discontinued      06/07/18 1720          Critical care time spent on the evaluation and treatment of severe organ dysfunction, review of pertinent labs and imaging studies, discussions with consulting providers and discussions with patient/family: 45 minutes.    Bernabe Peguero MD  Department of Hospital Medicine   [facility]

## 2018-06-09 NOTE — PROGRESS NOTES
Respiratory notified of the inability to maintain appropriate oxygenation saturations w/ pt. On BiPap, after numerous attempts to reposition mask.

## 2018-06-10 PROBLEM — E87.1 HYPONATREMIA: Status: RESOLVED | Noted: 2018-06-07 | Resolved: 2018-06-10

## 2018-06-10 PROBLEM — R78.81 BACTEREMIA: Status: ACTIVE | Noted: 2018-06-10

## 2018-06-10 PROBLEM — R78.81 PNEUMOCOCCAL BACTEREMIA: Status: ACTIVE | Noted: 2018-06-10

## 2018-06-10 PROBLEM — E87.0 HYPERNATREMIA: Status: ACTIVE | Noted: 2018-06-10

## 2018-06-10 PROBLEM — B95.3 PNEUMOCOCCAL BACTEREMIA: Status: ACTIVE | Noted: 2018-06-10

## 2018-06-10 LAB
ALBUMIN SERPL BCP-MCNC: 1.9 G/DL
ALLENS TEST: ABNORMAL
ALP SERPL-CCNC: 71 U/L
ALT SERPL W/O P-5'-P-CCNC: 36 U/L
ANION GAP SERPL CALC-SCNC: 11 MMOL/L
ANION GAP SERPL CALC-SCNC: 13 MMOL/L
ANISOCYTOSIS BLD QL SMEAR: SLIGHT
AST SERPL-CCNC: 52 U/L
BACTERIA BLD CULT: NORMAL
BASOPHILS # BLD AUTO: ABNORMAL K/UL
BASOPHILS NFR BLD: 0 %
BILIRUB SERPL-MCNC: 0.8 MG/DL
BUN SERPL-MCNC: 43 MG/DL
BUN SERPL-MCNC: 58 MG/DL
CALCIUM SERPL-MCNC: 9 MG/DL
CALCIUM SERPL-MCNC: 9.2 MG/DL
CHLORIDE SERPL-SCNC: 115 MMOL/L
CHLORIDE SERPL-SCNC: 115 MMOL/L
CO2 SERPL-SCNC: 28 MMOL/L
CO2 SERPL-SCNC: 29 MMOL/L
CREAT SERPL-MCNC: 0.9 MG/DL
CREAT SERPL-MCNC: 1.1 MG/DL
DELSYS: ABNORMAL
DIFFERENTIAL METHOD: ABNORMAL
EOSINOPHIL # BLD AUTO: ABNORMAL K/UL
EOSINOPHIL NFR BLD: 0 %
EP: 8
ERYTHROCYTE [DISTWIDTH] IN BLOOD BY AUTOMATED COUNT: 15.6 %
ERYTHROCYTE [SEDIMENTATION RATE] IN BLOOD BY WESTERGREN METHOD: 12 MM/H
EST. GFR  (AFRICAN AMERICAN): >60 ML/MIN/1.73 M^2
EST. GFR  (AFRICAN AMERICAN): >60 ML/MIN/1.73 M^2
EST. GFR  (NON AFRICAN AMERICAN): >60 ML/MIN/1.73 M^2
EST. GFR  (NON AFRICAN AMERICAN): >60 ML/MIN/1.73 M^2
GLUCOSE SERPL-MCNC: 138 MG/DL
GLUCOSE SERPL-MCNC: 140 MG/DL
HCO3 UR-SCNC: 30.7 MMOL/L (ref 24–28)
HCT VFR BLD AUTO: 37.9 %
HGB BLD-MCNC: 12.5 G/DL
IP: 18
LYMPHOCYTES # BLD AUTO: ABNORMAL K/UL
LYMPHOCYTES NFR BLD: 7 %
MAGNESIUM SERPL-MCNC: 2.6 MG/DL
MCH RBC QN AUTO: 30.6 PG
MCHC RBC AUTO-ENTMCNC: 33 G/DL
MCV RBC AUTO: 93 FL
MODE: ABNORMAL
MONOCYTES # BLD AUTO: ABNORMAL K/UL
MONOCYTES NFR BLD: 4 %
NEUTROPHILS NFR BLD: 85 %
NEUTS BAND NFR BLD MANUAL: 4 %
OVALOCYTES BLD QL SMEAR: ABNORMAL
PCO2 BLDA: 49.7 MMHG (ref 35–45)
PH SMN: 7.4 [PH] (ref 7.35–7.45)
PHOSPHATE SERPL-MCNC: 2.7 MG/DL
PLATELET # BLD AUTO: 208 K/UL
PLATELET BLD QL SMEAR: ABNORMAL
PMV BLD AUTO: 8.6 FL
PO2 BLDA: 65 MMHG (ref 80–100)
POC BE: 6 MMOL/L
POC SATURATED O2: 92 % (ref 95–100)
POC TCO2: 32 MMOL/L (ref 23–27)
POIKILOCYTOSIS BLD QL SMEAR: SLIGHT
POLYCHROMASIA BLD QL SMEAR: ABNORMAL
POTASSIUM SERPL-SCNC: 3.7 MMOL/L
POTASSIUM SERPL-SCNC: 4.4 MMOL/L
PROT SERPL-MCNC: 6.6 G/DL
RBC # BLD AUTO: 4.09 M/UL
SAMPLE: ABNORMAL
SITE: ABNORMAL
SODIUM SERPL-SCNC: 155 MMOL/L
SODIUM SERPL-SCNC: 156 MMOL/L
WBC # BLD AUTO: 19.5 K/UL

## 2018-06-10 PROCEDURE — 83735 ASSAY OF MAGNESIUM: CPT

## 2018-06-10 PROCEDURE — 27000221 HC OXYGEN, UP TO 24 HOURS

## 2018-06-10 PROCEDURE — 63600175 PHARM REV CODE 636 W HCPCS: Performed by: INTERNAL MEDICINE

## 2018-06-10 PROCEDURE — 25000003 PHARM REV CODE 250: Performed by: NURSE PRACTITIONER

## 2018-06-10 PROCEDURE — 87040 BLOOD CULTURE FOR BACTERIA: CPT | Mod: 59

## 2018-06-10 PROCEDURE — 20000000 HC ICU ROOM

## 2018-06-10 PROCEDURE — 85007 BL SMEAR W/DIFF WBC COUNT: CPT

## 2018-06-10 PROCEDURE — 25000003 PHARM REV CODE 250: Performed by: EMERGENCY MEDICINE

## 2018-06-10 PROCEDURE — C9113 INJ PANTOPRAZOLE SODIUM, VIA: HCPCS | Performed by: INTERNAL MEDICINE

## 2018-06-10 PROCEDURE — 94761 N-INVAS EAR/PLS OXIMETRY MLT: CPT

## 2018-06-10 PROCEDURE — 94640 AIRWAY INHALATION TREATMENT: CPT

## 2018-06-10 PROCEDURE — 99232 SBSQ HOSP IP/OBS MODERATE 35: CPT | Mod: ,,, | Performed by: INTERNAL MEDICINE

## 2018-06-10 PROCEDURE — 94660 CPAP INITIATION&MGMT: CPT

## 2018-06-10 PROCEDURE — 25000003 PHARM REV CODE 250: Performed by: INTERNAL MEDICINE

## 2018-06-10 PROCEDURE — 80053 COMPREHEN METABOLIC PANEL: CPT

## 2018-06-10 PROCEDURE — 36600 WITHDRAWAL OF ARTERIAL BLOOD: CPT

## 2018-06-10 PROCEDURE — 80048 BASIC METABOLIC PNL TOTAL CA: CPT

## 2018-06-10 PROCEDURE — 25000242 PHARM REV CODE 250 ALT 637 W/ HCPCS: Performed by: INTERNAL MEDICINE

## 2018-06-10 PROCEDURE — 85027 COMPLETE CBC AUTOMATED: CPT

## 2018-06-10 PROCEDURE — 84100 ASSAY OF PHOSPHORUS: CPT

## 2018-06-10 PROCEDURE — 36415 COLL VENOUS BLD VENIPUNCTURE: CPT

## 2018-06-10 PROCEDURE — 82803 BLOOD GASES ANY COMBINATION: CPT

## 2018-06-10 PROCEDURE — 99900035 HC TECH TIME PER 15 MIN (STAT)

## 2018-06-10 RX ORDER — MEMANTINE HYDROCHLORIDE 5 MG/1
5 TABLET ORAL 2 TIMES DAILY
Status: DISCONTINUED | OUTPATIENT
Start: 2018-06-10 | End: 2018-06-19 | Stop reason: HOSPADM

## 2018-06-10 RX ORDER — AMLODIPINE BESYLATE 5 MG/1
10 TABLET ORAL DAILY
Status: DISCONTINUED | OUTPATIENT
Start: 2018-06-10 | End: 2018-06-19 | Stop reason: HOSPADM

## 2018-06-10 RX ORDER — HALOPERIDOL 5 MG/ML
2 INJECTION INTRAMUSCULAR EVERY 6 HOURS PRN
Status: DISCONTINUED | OUTPATIENT
Start: 2018-06-10 | End: 2018-06-10

## 2018-06-10 RX ORDER — LISINOPRIL 40 MG/1
40 TABLET ORAL DAILY
Status: DISCONTINUED | OUTPATIENT
Start: 2018-06-10 | End: 2018-06-19 | Stop reason: HOSPADM

## 2018-06-10 RX ORDER — SODIUM CHLORIDE 450 MG/100ML
INJECTION, SOLUTION INTRAVENOUS CONTINUOUS
Status: DISCONTINUED | OUTPATIENT
Start: 2018-06-10 | End: 2018-06-11

## 2018-06-10 RX ORDER — QUETIAPINE FUMARATE 25 MG/1
25 TABLET, FILM COATED ORAL NIGHTLY
Status: DISCONTINUED | OUTPATIENT
Start: 2018-06-10 | End: 2018-06-19 | Stop reason: HOSPADM

## 2018-06-10 RX ORDER — HALOPERIDOL 5 MG/ML
2 INJECTION INTRAMUSCULAR ONCE
Status: COMPLETED | OUTPATIENT
Start: 2018-06-10 | End: 2018-06-10

## 2018-06-10 RX ADMIN — PIPERACILLIN AND TAZOBACTAM 3.38 G: 3; .375 INJECTION, POWDER, LYOPHILIZED, FOR SOLUTION INTRAVENOUS; PARENTERAL at 06:06

## 2018-06-10 RX ADMIN — IPRATROPIUM BROMIDE AND ALBUTEROL SULFATE 3 ML: .5; 3 SOLUTION RESPIRATORY (INHALATION) at 01:06

## 2018-06-10 RX ADMIN — DONEPEZIL HYDROCHLORIDE 10 MG: 5 TABLET, FILM COATED ORAL at 09:06

## 2018-06-10 RX ADMIN — AMLODIPINE BESYLATE 10 MG: 5 TABLET ORAL at 09:06

## 2018-06-10 RX ADMIN — SODIUM CHLORIDE: 0.45 INJECTION, SOLUTION INTRAVENOUS at 02:06

## 2018-06-10 RX ADMIN — IPRATROPIUM BROMIDE AND ALBUTEROL SULFATE 3 ML: .5; 3 SOLUTION RESPIRATORY (INHALATION) at 07:06

## 2018-06-10 RX ADMIN — ENOXAPARIN SODIUM 40 MG: 100 INJECTION SUBCUTANEOUS at 05:06

## 2018-06-10 RX ADMIN — SODIUM CHLORIDE: 0.45 INJECTION, SOLUTION INTRAVENOUS at 09:06

## 2018-06-10 RX ADMIN — TRAMADOL HYDROCHLORIDE 50 MG: 50 TABLET, FILM COATED ORAL at 01:06

## 2018-06-10 RX ADMIN — LAMOTRIGINE 25 MG: 25 TABLET ORAL at 09:06

## 2018-06-10 RX ADMIN — LISINOPRIL 40 MG: 40 TABLET ORAL at 11:06

## 2018-06-10 RX ADMIN — FOLIC ACID 1 MG: 1 TABLET ORAL at 09:06

## 2018-06-10 RX ADMIN — SODIUM CHLORIDE: 0.45 INJECTION, SOLUTION INTRAVENOUS at 04:06

## 2018-06-10 RX ADMIN — MEMANTINE HYDROCHLORIDE 5 MG: 5 TABLET ORAL at 09:06

## 2018-06-10 RX ADMIN — PIPERACILLIN AND TAZOBACTAM 3.38 G: 3; .375 INJECTION, POWDER, LYOPHILIZED, FOR SOLUTION INTRAVENOUS; PARENTERAL at 05:06

## 2018-06-10 RX ADMIN — QUETIAPINE FUMARATE 25 MG: 25 TABLET ORAL at 09:06

## 2018-06-10 RX ADMIN — IPRATROPIUM BROMIDE AND ALBUTEROL SULFATE 3 ML: .5; 3 SOLUTION RESPIRATORY (INHALATION) at 12:06

## 2018-06-10 RX ADMIN — HALOPERIDOL LACTATE 2 MG: 5 INJECTION, SOLUTION INTRAMUSCULAR at 09:06

## 2018-06-10 RX ADMIN — ACETAMINOPHEN 650 MG: 325 TABLET, FILM COATED ORAL at 07:06

## 2018-06-10 RX ADMIN — PANTOPRAZOLE SODIUM 40 MG: 40 INJECTION, POWDER, LYOPHILIZED, FOR SOLUTION INTRAVENOUS at 09:06

## 2018-06-10 NOTE — SUBJECTIVE & OBJECTIVE
Interval History: sodium increasing. Patient states he feels bad and is yelling with agitation. Otherwise he is not able to verbalize any complaints due to repeatedly saying the same thing. Blood pressure elevated to 170s/80s.  ABG improved this morning while on continuous bipap.      Review of Systems   Unable to perform ROS: Dementia     Objective:     Vital Signs (Most Recent):  Temp: 99.2 °F (37.3 °C) (06/10/18 0723)  Pulse: 97 (06/10/18 0723)  Resp: (!) 25 (06/10/18 0723)  BP: (!) 178/81 (06/10/18 0723)  SpO2: 97 % (06/10/18 0723) Vital Signs (24h Range):  Temp:  [98.4 °F (36.9 °C)-99.2 °F (37.3 °C)] 99.2 °F (37.3 °C)  Pulse:  [] 97  Resp:  [12-35] 25  SpO2:  [90 %-100 %] 97 %  BP: (143-178)/(65-91) 178/81     Weight: 108.5 kg (239 lb 3.2 oz)  Body mass index is 31.56 kg/m².    Intake/Output Summary (Last 24 hours) at 06/10/18 0905  Last data filed at 06/10/18 0603   Gross per 24 hour   Intake          3404.16 ml   Output             2600 ml   Net           804.16 ml      Physical Exam   Constitutional: He appears well-developed and well-nourished. He appears distressed.   HENT:   Head: Normocephalic and atraumatic.   Nose: Nose normal.   Mouth/Throat: No oropharyngeal exudate.   Eyes: Conjunctivae are normal. Pupils are equal, round, and reactive to light. Right eye exhibits no discharge. Left eye exhibits no discharge. No scleral icterus.   Neck: Normal range of motion. Neck supple. No tracheal deviation present. No thyromegaly present.   Cardiovascular: Normal rate, normal heart sounds and intact distal pulses.  Exam reveals no gallop and no friction rub.    No murmur heard.  tachycardic   Pulmonary/Chest: He is in respiratory distress. He has wheezes. He has rales.   On bipap   Abdominal: Soft. Bowel sounds are normal. He exhibits no distension and no mass. There is no tenderness. There is no rebound and no guarding.   Skin: He is not diaphoretic.       Significant Labs: All pertinent labs within the  past 24 hours have been reviewed.    Significant Imaging: I have reviewed and interpreted all pertinent imaging results/findings within the past 24 hours.

## 2018-06-10 NOTE — PROGRESS NOTES
Progress Note  PULMONARY    Admit Date: 6/7/2018   06/10/2018      SUBJECTIVE:     June 9, perseverates , on niv,    Zee 10, as above still, no c/o save not feeling good    PFSH and Allergies reviewed.    OBJECTIVE:     Vitals (Most recent):  Vitals:    06/10/18 1331   BP:    Pulse: 97   Resp: (!) 28   Temp:        Vitals (24 hour range):  Temp:  [97.8 °F (36.6 °C)-99.2 °F (37.3 °C)]   Pulse:  []   Resp:  [12-32]   BP: (146-194)/(69-92)   SpO2:  [93 %-100 %]       Intake/Output Summary (Last 24 hours) at 06/10/18 1500  Last data filed at 06/10/18 0603   Gross per 24 hour   Intake          3404.16 ml   Output             2000 ml   Net          1404.16 ml          Physical Exam:  The patient's neuro status (alertness,orientation,cognitive function,motor skills,), pharyngeal exam (oral lesions, hygiene, abn dentition,), Neck (jvd,mass,thyroid,nodes in neck and above/below clavicle),RESPIRATORY(symmetry,effort,fremitus,percussion,auscultation),  Cor(rhythm,heart tones including gallops,perfusion,edema)ABD(distention,hepatic&splenomegaly,tenderness,masses), Skin(rash,cyanosis),Psyc(affect,judgement,).  Exam negative except for these pertinent findings:    Alert but   confused, on niv, chest is symmetric, no distress, normal percussion, normal fremitus and good normal breath sounds  No edema, cor rrr, no murmur     Radiographs reviewed: view by direct vision  - impressive right lung infiltrate c/w aspiration pneumonia- no new 6/10  Results for orders placed during the hospital encounter of 06/07/18   X-Ray Chest 1 View    Narrative EXAMINATION:  XR CHEST 1 VIEW    CLINICAL HISTORY:  low o2 sat;    TECHNIQUE:  Single frontal view of the chest was performed.    COMPARISON:  Chest of June 7, 2018.    FINDINGS:  The cardiac size is within normal limits.  Calcification is noted in the aorta.  There is increased density in the right lower lobe infiltrate consistent with pneumonia and possible small pleural effusion.  The  left lung is relatively clear.  No pneumothorax is seen.      Impression Increasing density of right lower lobe infiltrate and probable pleural effusion.  Atherosclerosis.      Electronically signed by: Juan Collier MD  Date:    06/08/2018  Time:    10:51   ]    Labs       Recent Labs  Lab 06/10/18  0349   WBC 19.50*   HGB 12.5*   HCT 37.9*      BAND 4.0       Recent Labs  Lab 06/10/18  0349   *   K 4.4   *   CO2 29   BUN 58*   CREATININE 1.1   *   CALCIUM 9.0   MG 2.6   PHOS 2.7   AST 52*   ALT 36   ALKPHOS 71   BILITOT 0.8   PROT 6.6   ALBUMIN 1.9*       Recent Labs  Lab 06/10/18  0832   PH 7.399   PCO2 49.7*   PO2 65*   HCO3 30.7*     Microbiology Results (last 7 days)     Procedure Component Value Units Date/Time    Blood culture [[id number]]     Order Status:  Sent Specimen:  Blood     Blood culture [[id number]]     Order Status:  Sent Specimen:  Blood     Blood culture [[id number]]  (Susceptibility) Collected:  06/07/18 1501    Order Status:  Completed Specimen:  Blood Updated:  06/10/18 0755     Blood Culture, Routine Gram stain peds bottle: Gram positive cocci in chains resembling Strep       Blood Culture, Routine Positive results previously called 06/08/2018  18:18     Blood Culture, Routine STREPTOCOCCUS PNEUMONIAE    Narrative:       Aerobic and anaerobic    Blood culture [[id number]] Collected:  06/07/18 1502    Order Status:  Completed Specimen:  Blood Updated:  06/10/18 0751     Blood Culture, Routine Gram stain peds bottle: Gram positive cocci in chains resembling Strep     Blood Culture, Routine Results called to and read back by: Juan Canales RN 06/08/2018  14:59     Blood Culture, Routine --     STREPTOCOCCUS PNEUMONIAE  For susceptibility see order # [id number]      Narrative:       Aerobic and anaerobic    Culture, Respiratory with Gram Stain [[id number]] Collected:  06/09/18 0453    Order Status:  Completed Specimen:  Respiratory from Tracheal Aspirate Updated:   06/10/18 0649     Respiratory Culture --     GRAM NEGATIVE LENA, NON-LACTOSE   Few  Identification and susceptibility pending       Gram Stain (Respiratory) <10 epithelial cells per low power field.     Gram Stain (Respiratory) Rare WBC's     Gram Stain (Respiratory) No organisms seen    Culture, Respiratory with Gram Stain [[id number]]     Order Status:  Canceled Specimen:  Respiratory           Impression:  Active Hospital Problems    Diagnosis  POA    *Acute respiratory failure with hypoxia and hypercapnia [J96.01, J96.02]  Yes    Bacteremia [R78.81]  Yes    Hypernatremia [E87.0]  Clinically Undetermined    Pneumococcal bacteremia [R78.81]  Yes    Dementia with behavioral disturbance [F03.91]  Unknown    COPD with acute lower respiratory infection [J44.0]  Yes    Pneumonia of right lower lobe due to infectious organism [J18.1]  Yes    Malnutrition of moderate degree [E44.0]  Yes    ANÍBAL (acute kidney injury) [N17.9]  Yes    Hypertension [I10]  Yes      Resolved Hospital Problems    Diagnosis Date Resolved POA    Hyponatremia [E87.1] 06/10/2018 Yes     Plan:   June 9, expect aspiration, still too unstable to get off bipap.  Anderson remains poor.  Cont abx and observe.    Zee 10 , very alert , confused, on bipap still.  Stop vanc with gnr in sputum, and strep pneumo in blood.  Anderson remains poor.                                    .

## 2018-06-10 NOTE — PLAN OF CARE
"Problem: Patient Care Overview  Goal: Plan of Care Review  Outcome: Ongoing (interventions implemented as appropriate)  Pt has remained on bipap thru night with breaks with nasal cannula for oral care. Sats drop after being on nasal cannula for period of time.  Pt is confused. Yells out loud with repetitive words mostly stating "I feel bad I want coffee" Bilateral wrist restraints maintained for safety. Pt is very strong and will try to grab at staff if in close proximity to him. Afebrile. ST on monitor. IVF maintained. Safety measures in place.       "

## 2018-06-10 NOTE — ASSESSMENT & PLAN NOTE
Dementia is uncontrolled. Continue home dementia meds and non-pharmacologic interventions to prevent delirium (No VS between 11PM-5AM, activity during day, opening blinds, providing glasses/hearing aids, and up in chair during daytime). Use PRN anti-psychotics to prevent behavior of self harm during sundowning, and avoid narcotics and benzos unless absolutely necessary. PRN anti-psychotics prescribed to avoid self harm behaviors.   continue Aricept and Namenda.  Requiring restraints due to interference of medical equipment as he is currently bipap dependent  Did require Once time dose of haldol today for extreme agitation while coming off bipap

## 2018-06-10 NOTE — ASSESSMENT & PLAN NOTE
If patient again fails swallow study will need to start D5 or place NGT for tube feeds  Will follow blood sugars closely

## 2018-06-10 NOTE — ASSESSMENT & PLAN NOTE
2/2 to strep pneumonia  Continue broad spectrum antibiotics until susceptibilities result  Will obtain echocardiogram to rule out endocarditis  Repeat blood cultures today to test for clearence

## 2018-06-10 NOTE — PROGRESS NOTES
[facility] Hospital Medicine  Progress Note    Patient Name: Angel Pineda  MRN: [id number]  Patient Class: IP- Inpatient   Admission Date: 6/7/2018  Length of Stay: 3 days  Attending Physician: Aston Washington MD  Primary Care Provider: Primary Doctor No        Subjective:     Principal Problem:Acute respiratory failure with hypoxia and hypercapnia    HPI:  Patient is a 73 y.o. male admitted to Hospitalist Service from [facility] Room with complaint of SOB. Patient reportedly has past medical history significant for hypertension and dementia. Patient is a resident of [facility]. Further detail of HPI are not available. Patient's son is at bedside who is POA. He reports, patient had some diarrhea for 1-2 days but no fever. He was started on some antibiotics 2 days ago    Hospital Course:  No notes on file    Interval History: sodium increasing. Patient states he feels bad and is yelling with agitation. Otherwise he is not able to verbalize any complaints due to repeatedly saying the same thing. Blood pressure elevated to 170s/80s.  ABG improved this morning while on continuous bipap.      Review of Systems   Unable to perform ROS: Dementia     Objective:     Vital Signs (Most Recent):  Temp: 99.2 °F (37.3 °C) (06/10/18 0723)  Pulse: 97 (06/10/18 0723)  Resp: (!) 25 (06/10/18 0723)  BP: (!) 178/81 (06/10/18 0723)  SpO2: 97 % (06/10/18 0723) Vital Signs (24h Range):  Temp:  [98.4 °F (36.9 °C)-99.2 °F (37.3 °C)] 99.2 °F (37.3 °C)  Pulse:  [] 97  Resp:  [12-35] 25  SpO2:  [90 %-100 %] 97 %  BP: (143-178)/(65-91) 178/81     Weight: 108.5 kg (239 lb 3.2 oz)  Body mass index is 31.56 kg/m².    Intake/Output Summary (Last 24 hours) at 06/10/18 0905  Last data filed at 06/10/18 0603   Gross per 24 hour   Intake          3404.16 ml   Output             2600 ml   Net           804.16 ml      Physical Exam   Constitutional: He appears well-developed and well-nourished. He  appears distressed.   HENT:   Head: Normocephalic and atraumatic.   Nose: Nose normal.   Mouth/Throat: No oropharyngeal exudate.   Eyes: Conjunctivae are normal. Pupils are equal, round, and reactive to light. Right eye exhibits no discharge. Left eye exhibits no discharge. No scleral icterus.   Neck: Normal range of motion. Neck supple. No tracheal deviation present. No thyromegaly present.   Cardiovascular: Normal rate, normal heart sounds and intact distal pulses.  Exam reveals no gallop and no friction rub.    No murmur heard.  tachycardic   Pulmonary/Chest: He is in respiratory distress. He has wheezes. He has rales.   On bipap   Abdominal: Soft. Bowel sounds are normal. He exhibits no distension and no mass. There is no tenderness. There is no rebound and no guarding.   Skin: He is not diaphoretic.       Significant Labs: All pertinent labs within the past 24 hours have been reviewed.    Significant Imaging: I have reviewed and interpreted all pertinent imaging results/findings within the past 24 hours.    Assessment/Plan:      * Acute respiratory failure with hypoxia and hypercapnia    Secondary to strep pneumonia, final cultures pending with sterp bacteremia  Supplemental oxygen per bipap  Continue current setting of bipap. Can go to 2hrs on 2 hours off or per pulmonary recommendations  continue antibiotics. Pharmacy to dose vanc  abg today reviewed          Hypernatremia    Had hyponatremia at admission.  This resolved with fluid resuscitation  Chloride also elevated, thus this may be from NaCl resucitation  Will start 1/2 NS today and recheck today            Bacteremia    2/2 to strep pneumonia  Continue broad spectrum antibiotics until susceptibilities result  Will obtain echocardiogram to rule out endocarditis  Repeat blood cultures today to test for clearence        Dementia with behavioral disturbance    Dementia is uncontrolled. Continue home dementia meds and non-pharmacologic interventions to  prevent delirium (No VS between 11PM-5AM, activity during day, opening blinds, providing glasses/hearing aids, and up in chair during daytime). Use PRN anti-psychotics to prevent behavior of self harm during sundowning, and avoid narcotics and benzos unless absolutely necessary. PRN anti-psychotics prescribed to avoid self harm behaviors.   continue Aricept and Namenda.  Requiring restraints due to interference of medical equipment as he is currently bipap dependent  Did require Once time dose of haldol today for extreme agitation while coming off bipap        COPD with acute lower respiratory infection    Continue bipap with breaks, will expect improvement while on antibiotic therapy  Will follow pulmonary recommendations  Scheduled breathing treatments        Hypertension    In setting of bipap dependence will hold beta blocker and home lisinopril in setting of ANÍBAL. Will continue Tele-monitoring.        ANÍBAL (acute kidney injury)      Continue IVF hydration. Follow BMP.---improving        Malnutrition of moderate degree    If patient again fails swallow study will need to start D5 or place NGT for tube feeds  Will follow blood sugars closely        Pneumonia of right lower lobe due to infectious organism    2/2 to strep pneumonia--susceptibilities pending  As seen on xray and leukocytosis  Continue antibiotics, may be able to take of vancomycin soon  Scheduled breathing treatements  Oxygent to keep sats >92%            VTE Risk Mitigation         Ordered     enoxaparin injection 40 mg  Daily      06/07/18 1720     IP VTE HIGH RISK PATIENT  Once      06/07/18 1720     Place AME hose  Until discontinued      06/07/18 1720     Place sequential compression device  Until discontinued      06/07/18 1720          Critical care time spent on the evaluation and treatment of severe organ dysfunction, review of pertinent labs and imaging studies, discussions with consulting providers and discussions with patient/family: 45  minutes.    Bernabe Peguero MD  Department of Hospital Medicine   [facility]

## 2018-06-10 NOTE — PLAN OF CARE
Problem: Patient Care Overview  Goal: Plan of Care Review  Outcome: Ongoing (interventions implemented as appropriate)  Pt on bipap 18/8 with Q6 duoneb treatments.

## 2018-06-10 NOTE — CONSULTS
Angel Pineda [id number] is a 73 y.o. male who had been consulted for vancomycin dosing.    Vancomycin has been discontinued.  Pharmacy consult for vancomycin dosing in no longer required.      Thank you for allowing us to participate in this patient's care.     Jessica Dougherty, PharmD.  06/10/2018

## 2018-06-10 NOTE — PLAN OF CARE
"Problem: Patient Care Overview  Goal: Plan of Care Review  Outcome: Ongoing (interventions implemented as appropriate)  Every two hour restraint checks. Skin remains intact. BUE elevated on pillows. Edema to hands have decreased. Son and daughter in law in for a visit. Daughter in law updated on pt activity for the day. While bi-pap was off pt kept saying " I'm dying" over and over. After numerous attempts from nurse and respiratory to reassure pt that he was not dying he finally settled down. When daughter in law came in he told her "I'm dying". At that point nurse made her aware of pt's repeated saying " want coffee' " I don't feel good" Ultram given prior to bi-pap replacement. Pt settled down.      "

## 2018-06-10 NOTE — ASSESSMENT & PLAN NOTE
Secondary to strep pneumonia, final cultures pending with sterp bacteremia  Supplemental oxygen per bipap  Continue current setting of bipap. Can go to 2hrs on 2 hours off or per pulmonary recommendations  continue antibiotics. Pharmacy to dose vanc  abg today reviewed

## 2018-06-10 NOTE — ASSESSMENT & PLAN NOTE
Had hyponatremia at admission.  This resolved with fluid resuscitation  Chloride also elevated, thus this may be from NaCl resucitation  Will start 1/2 NS today and recheck today

## 2018-06-10 NOTE — ASSESSMENT & PLAN NOTE
2/2 to strep pneumonia--susceptibilities pending  As seen on xray and leukocytosis  Continue antibiotics, may be able to take of vancomycin soon  Scheduled breathing treatements  Oxygent to keep sats >92%

## 2018-06-10 NOTE — PROGRESS NOTES
Results for SARAH LAURENT (MRN [id number]) as of 6/10/2018 04:40   Ref. Range 6/10/2018 03:49   Sodium Latest Ref Range: 136 - 145 mmol/L 155 (H)   Called to RICARDO Lagunas NP

## 2018-06-10 NOTE — ASSESSMENT & PLAN NOTE
Continue bipap with breaks, will expect improvement while on antibiotic therapy  Will follow pulmonary recommendations  Scheduled breathing treatments

## 2018-06-11 PROBLEM — F03.918 DEMENTIA WITH BEHAVIORAL DISTURBANCE: Status: ACTIVE | Noted: 2018-06-11

## 2018-06-11 PROBLEM — E87.0 HYPERNATREMIA: Status: ACTIVE | Noted: 2018-06-11

## 2018-06-11 LAB
ALBUMIN SERPL BCP-MCNC: 2 G/DL
ALP SERPL-CCNC: 74 U/L
ALT SERPL W/O P-5'-P-CCNC: 41 U/L
ANION GAP SERPL CALC-SCNC: 10 MMOL/L
ANION GAP SERPL CALC-SCNC: 10 MMOL/L
ANION GAP SERPL CALC-SCNC: 11 MMOL/L
ANION GAP SERPL CALC-SCNC: 13 MMOL/L
ANISOCYTOSIS BLD QL SMEAR: SLIGHT
AORTIC ROOT ANNULUS: 3.27 CM
AORTIC VALVE CUSP SEPERATION: 1.69 CM
AST SERPL-CCNC: 55 U/L
AV MEAN GRADIENT: 4.72 MMHG
AV PEAK GRADIENT: 8.49 MMHG
AV VALVE AREA: 4.05 CM2
BASOPHILS # BLD AUTO: ABNORMAL K/UL
BASOPHILS NFR BLD: 0 %
BILIRUB SERPL-MCNC: 0.9 MG/DL
BSA FOR ECHO PROCEDURE: 2.36 M2
BUN SERPL-MCNC: 26 MG/DL
BUN SERPL-MCNC: 31 MG/DL
BUN SERPL-MCNC: 31 MG/DL
BUN SERPL-MCNC: 35 MG/DL
CALCIUM SERPL-MCNC: 9.1 MG/DL
CALCIUM SERPL-MCNC: 9.1 MG/DL
CALCIUM SERPL-MCNC: 9.2 MG/DL
CALCIUM SERPL-MCNC: 9.2 MG/DL
CHLORIDE SERPL-SCNC: 115 MMOL/L
CHLORIDE SERPL-SCNC: 116 MMOL/L
CHLORIDE SERPL-SCNC: 116 MMOL/L
CHLORIDE SERPL-SCNC: 117 MMOL/L
CO2 SERPL-SCNC: 27 MMOL/L
CO2 SERPL-SCNC: 30 MMOL/L
CREAT SERPL-MCNC: 0.8 MG/DL
CREAT SERPL-MCNC: 0.9 MG/DL
CV ECHO LV RWT: 0.61 CM
DIFFERENTIAL METHOD: ABNORMAL
DOP CALC AO PEAK VEL: 1.46 M/S
DOP CALC AO VTI: 24.46 CM
DOP CALC LVOT AREA: 3.87 CM2
DOP CALC LVOT DIAMETER: 2.22 CM
DOP CALC LVOT STROKE VOLUME: 99.08 CM3
DOP CALCLVOT PEAK VEL VTI: 25.61 CM
E WAVE DECELERATION TIME: 153.12 MSEC
E/A RATIO: 0.72
E/E' RATIO: 9.56
ECHO LV POSTERIOR WALL: 1.18 CM (ref 0.6–1.1)
EOSINOPHIL # BLD AUTO: ABNORMAL K/UL
EOSINOPHIL NFR BLD: 0 %
ERYTHROCYTE [DISTWIDTH] IN BLOOD BY AUTOMATED COUNT: 15.8 %
EST. GFR  (AFRICAN AMERICAN): >60 ML/MIN/1.73 M^2
EST. GFR  (NON AFRICAN AMERICAN): >60 ML/MIN/1.73 M^2
FRACTIONAL SHORTENING: 33 % (ref 28–44)
GLUCOSE SERPL-MCNC: 137 MG/DL
GLUCOSE SERPL-MCNC: 144 MG/DL
GLUCOSE SERPL-MCNC: 147 MG/DL
GLUCOSE SERPL-MCNC: 147 MG/DL
HCT VFR BLD AUTO: 40.2 %
HGB BLD-MCNC: 13.2 G/DL
INTERVENTRICULAR SEPTUM: 1.18 CM (ref 0.6–1.1)
IVRT: 0.09 MSEC
LEFT ATRIUM SIZE: 3.84 CM
LEFT INTERNAL DIMENSION IN SYSTOLE: 2.59 CM (ref 2.1–4)
LEFT VENTRICLE MASS INDEX: 65.6 G/M2
LEFT VENTRICULAR INTERNAL DIMENSION IN DIASTOLE: 3.89 CM (ref 3.5–6)
LEFT VENTRICULAR MASS: 154.75 G
LV LATERAL E/E' RATIO: 8.6
LV SEPTAL E/E' RATIO: 10.75
LYMPHOCYTES # BLD AUTO: ABNORMAL K/UL
LYMPHOCYTES NFR BLD: 11 %
MCH RBC QN AUTO: 30.4 PG
MCHC RBC AUTO-ENTMCNC: 32.8 G/DL
MCV RBC AUTO: 93 FL
MONOCYTES # BLD AUTO: ABNORMAL K/UL
MONOCYTES NFR BLD: 3 %
MV PEAK A VEL: 1.19 M/S
MV PEAK E VEL: 0.86 M/S
MV STENOSIS PRESSURE HALF TIME: 44.4 MS
MV VALVE AREA P 1/2 METHOD: 4.95 CM2
NEUTROPHILS NFR BLD: 82 %
NEUTS BAND NFR BLD MANUAL: 4 %
OVALOCYTES BLD QL SMEAR: ABNORMAL
PISA TR MAX VEL: 1.29 M/S
PLATELET # BLD AUTO: 222 K/UL
PLATELET BLD QL SMEAR: ABNORMAL
PMV BLD AUTO: 8.7 FL
POIKILOCYTOSIS BLD QL SMEAR: SLIGHT
POLYCHROMASIA BLD QL SMEAR: ABNORMAL
POTASSIUM SERPL-SCNC: 3.5 MMOL/L
POTASSIUM SERPL-SCNC: 3.5 MMOL/L
POTASSIUM SERPL-SCNC: 3.6 MMOL/L
POTASSIUM SERPL-SCNC: 3.9 MMOL/L
PROT SERPL-MCNC: 6.6 G/DL
PV PEAK GRADIENT: 6.86 MMHG
PV PEAK VELOCITY: 1.31 CM/S
RBC # BLD AUTO: 4.34 M/UL
RIGHT VENTRICULAR END-DIASTOLIC DIMENSION: 3.83 CM
SODIUM SERPL-SCNC: 156 MMOL/L
SODIUM SERPL-SCNC: 157 MMOL/L
TDI LATERAL: 0.1
TDI SEPTAL: 0.08
TDI: 0.09
TR MAX PG: 6.66 MMHG
TRICUSPID ANNULAR PLANE SYSTOLIC EXCURSION: 0.03 CM
WBC # BLD AUTO: 15.7 K/UL

## 2018-06-11 PROCEDURE — 94660 CPAP INITIATION&MGMT: CPT

## 2018-06-11 PROCEDURE — 36415 COLL VENOUS BLD VENIPUNCTURE: CPT

## 2018-06-11 PROCEDURE — 94761 N-INVAS EAR/PLS OXIMETRY MLT: CPT

## 2018-06-11 PROCEDURE — 99900035 HC TECH TIME PER 15 MIN (STAT)

## 2018-06-11 PROCEDURE — 25000003 PHARM REV CODE 250: Performed by: INTERNAL MEDICINE

## 2018-06-11 PROCEDURE — 25000003 PHARM REV CODE 250: Performed by: EMERGENCY MEDICINE

## 2018-06-11 PROCEDURE — 80048 BASIC METABOLIC PNL TOTAL CA: CPT | Mod: 91

## 2018-06-11 PROCEDURE — 99233 SBSQ HOSP IP/OBS HIGH 50: CPT | Mod: ,,, | Performed by: INTERNAL MEDICINE

## 2018-06-11 PROCEDURE — 85007 BL SMEAR W/DIFF WBC COUNT: CPT

## 2018-06-11 PROCEDURE — 99232 SBSQ HOSP IP/OBS MODERATE 35: CPT | Mod: ,,, | Performed by: INTERNAL MEDICINE

## 2018-06-11 PROCEDURE — 80053 COMPREHEN METABOLIC PANEL: CPT

## 2018-06-11 PROCEDURE — 63600175 PHARM REV CODE 636 W HCPCS: Performed by: NURSE PRACTITIONER

## 2018-06-11 PROCEDURE — 25000003 PHARM REV CODE 250: Performed by: NURSE PRACTITIONER

## 2018-06-11 PROCEDURE — 85027 COMPLETE CBC AUTOMATED: CPT

## 2018-06-11 PROCEDURE — C9113 INJ PANTOPRAZOLE SODIUM, VIA: HCPCS | Performed by: INTERNAL MEDICINE

## 2018-06-11 PROCEDURE — 20000000 HC ICU ROOM

## 2018-06-11 PROCEDURE — 80048 BASIC METABOLIC PNL TOTAL CA: CPT

## 2018-06-11 PROCEDURE — 31720 CLEARANCE OF AIRWAYS: CPT

## 2018-06-11 PROCEDURE — 27000221 HC OXYGEN, UP TO 24 HOURS

## 2018-06-11 PROCEDURE — 25000242 PHARM REV CODE 250 ALT 637 W/ HCPCS: Performed by: INTERNAL MEDICINE

## 2018-06-11 PROCEDURE — 94640 AIRWAY INHALATION TREATMENT: CPT

## 2018-06-11 PROCEDURE — 63600175 PHARM REV CODE 636 W HCPCS: Performed by: INTERNAL MEDICINE

## 2018-06-11 RX ORDER — DEXTROSE MONOHYDRATE 50 MG/ML
INJECTION, SOLUTION INTRAVENOUS CONTINUOUS
Status: DISCONTINUED | OUTPATIENT
Start: 2018-06-11 | End: 2018-06-12

## 2018-06-11 RX ORDER — METOPROLOL TARTRATE 1 MG/ML
5 INJECTION, SOLUTION INTRAVENOUS EVERY 6 HOURS
Status: DISCONTINUED | OUTPATIENT
Start: 2018-06-11 | End: 2018-06-19 | Stop reason: HOSPADM

## 2018-06-11 RX ORDER — HYDRALAZINE HYDROCHLORIDE 20 MG/ML
10 INJECTION INTRAMUSCULAR; INTRAVENOUS EVERY 6 HOURS PRN
Status: DISCONTINUED | OUTPATIENT
Start: 2018-06-11 | End: 2018-06-11

## 2018-06-11 RX ORDER — LABETALOL HYDROCHLORIDE 5 MG/ML
20 INJECTION, SOLUTION INTRAVENOUS
Status: DISCONTINUED | OUTPATIENT
Start: 2018-06-11 | End: 2018-06-19 | Stop reason: HOSPADM

## 2018-06-11 RX ORDER — HALOPERIDOL 5 MG/ML
2.5 INJECTION INTRAMUSCULAR ONCE
Status: COMPLETED | OUTPATIENT
Start: 2018-06-11 | End: 2018-06-11

## 2018-06-11 RX ORDER — HYDRALAZINE HYDROCHLORIDE 20 MG/ML
20 INJECTION INTRAMUSCULAR; INTRAVENOUS EVERY 6 HOURS PRN
Status: DISCONTINUED | OUTPATIENT
Start: 2018-06-11 | End: 2018-06-19 | Stop reason: HOSPADM

## 2018-06-11 RX ORDER — HALOPERIDOL 5 MG/ML
5 INJECTION INTRAMUSCULAR EVERY 8 HOURS PRN
Status: DISCONTINUED | OUTPATIENT
Start: 2018-06-11 | End: 2018-06-12

## 2018-06-11 RX ADMIN — SODIUM CHLORIDE: 0.45 INJECTION, SOLUTION INTRAVENOUS at 03:06

## 2018-06-11 RX ADMIN — LISINOPRIL 40 MG: 40 TABLET ORAL at 09:06

## 2018-06-11 RX ADMIN — METOPROLOL TARTRATE 5 MG: 5 INJECTION, SOLUTION INTRAVENOUS at 11:06

## 2018-06-11 RX ADMIN — LEUCINE, PHENYLALANINE, LYSINE, METHIONINE, ISOLEUCINE, VALINE, HISTIDINE, THREONINE, TRYPTOPHAN, ALANINE, GLYCINE, ARGININE, PROLINE, SERINE, TYROSINE, SODIUM ACETATE, DIBASIC POTASSIUM PHOSPHATE, MAGNESIUM CHLORIDE, SODIUM CHLORIDE, CALCIUM CHLORIDE, DEXTROSE
201; 154; 159; 110; 165; 160; 132; 116; 50; 570; 283; 316; 187; 138; 11; 217; 261; 51; 112; 33; 5 INJECTION INTRAVENOUS at 09:06

## 2018-06-11 RX ADMIN — HALOPERIDOL LACTATE 5 MG: 5 INJECTION, SOLUTION INTRAMUSCULAR at 07:06

## 2018-06-11 RX ADMIN — IPRATROPIUM BROMIDE AND ALBUTEROL SULFATE 3 ML: .5; 3 SOLUTION RESPIRATORY (INHALATION) at 01:06

## 2018-06-11 RX ADMIN — PIPERACILLIN AND TAZOBACTAM 3.38 G: 3; .375 INJECTION, POWDER, LYOPHILIZED, FOR SOLUTION INTRAVENOUS; PARENTERAL at 06:06

## 2018-06-11 RX ADMIN — HYDRALAZINE HYDROCHLORIDE 10 MG: 20 INJECTION INTRAMUSCULAR; INTRAVENOUS at 02:06

## 2018-06-11 RX ADMIN — FOLIC ACID 1 MG: 1 TABLET ORAL at 10:06

## 2018-06-11 RX ADMIN — PANTOPRAZOLE SODIUM 40 MG: 40 INJECTION, POWDER, LYOPHILIZED, FOR SOLUTION INTRAVENOUS at 08:06

## 2018-06-11 RX ADMIN — METOPROLOL TARTRATE 5 MG: 5 INJECTION, SOLUTION INTRAVENOUS at 07:06

## 2018-06-11 RX ADMIN — TRAMADOL HYDROCHLORIDE 50 MG: 50 TABLET, FILM COATED ORAL at 03:06

## 2018-06-11 RX ADMIN — QUETIAPINE FUMARATE 25 MG: 25 TABLET ORAL at 08:06

## 2018-06-11 RX ADMIN — PIPERACILLIN AND TAZOBACTAM 3.38 G: 3; .375 INJECTION, POWDER, LYOPHILIZED, FOR SOLUTION INTRAVENOUS; PARENTERAL at 01:06

## 2018-06-11 RX ADMIN — PIPERACILLIN AND TAZOBACTAM 3.38 G: 3; .375 INJECTION, POWDER, LYOPHILIZED, FOR SOLUTION INTRAVENOUS; PARENTERAL at 08:06

## 2018-06-11 RX ADMIN — ACETAMINOPHEN 650 MG: 325 TABLET, FILM COATED ORAL at 10:06

## 2018-06-11 RX ADMIN — DONEPEZIL HYDROCHLORIDE 10 MG: 5 TABLET, FILM COATED ORAL at 08:06

## 2018-06-11 RX ADMIN — HALOPERIDOL LACTATE 2.5 MG: 5 INJECTION, SOLUTION INTRAMUSCULAR at 09:06

## 2018-06-11 RX ADMIN — TRAMADOL HYDROCHLORIDE 50 MG: 50 TABLET, FILM COATED ORAL at 06:06

## 2018-06-11 RX ADMIN — MEMANTINE HYDROCHLORIDE 5 MG: 5 TABLET ORAL at 09:06

## 2018-06-11 RX ADMIN — MEMANTINE HYDROCHLORIDE 5 MG: 5 TABLET ORAL at 08:06

## 2018-06-11 RX ADMIN — IPRATROPIUM BROMIDE AND ALBUTEROL SULFATE 3 ML: .5; 3 SOLUTION RESPIRATORY (INHALATION) at 07:06

## 2018-06-11 RX ADMIN — LABETALOL HYDROCHLORIDE 20 MG: 5 INJECTION, SOLUTION INTRAVENOUS at 01:06

## 2018-06-11 RX ADMIN — LAMOTRIGINE 25 MG: 25 TABLET ORAL at 09:06

## 2018-06-11 RX ADMIN — LABETALOL HYDROCHLORIDE 20 MG: 5 INJECTION, SOLUTION INTRAVENOUS at 04:06

## 2018-06-11 RX ADMIN — ENOXAPARIN SODIUM 40 MG: 100 INJECTION SUBCUTANEOUS at 06:06

## 2018-06-11 RX ADMIN — DEXTROSE: 5 SOLUTION INTRAVENOUS at 05:06

## 2018-06-11 RX ADMIN — AMLODIPINE BESYLATE 10 MG: 5 TABLET ORAL at 09:06

## 2018-06-11 RX ADMIN — LAMOTRIGINE 25 MG: 25 TABLET ORAL at 08:06

## 2018-06-11 RX ADMIN — HYDRALAZINE HYDROCHLORIDE 20 MG: 20 INJECTION INTRAMUSCULAR; INTRAVENOUS at 05:06

## 2018-06-11 NOTE — PROGRESS NOTES
Progress Note  Hospital Medicine  Patient Name:Angel Pineda  MRN:  [id number]  Patient Class: IP- Inpatient  Admit Date: 6/7/2018  Length of Stay: 4 days  Expected Discharge Date:   Attending Physician: Aston Washington MD  Primary Care Provider:  Primary Doctor No    SUBJECTIVE:     Principal Problem: Acute respiratory failure with hypoxia and hypercapnia  Initial history of present illness: Patient is a 73 y.o. male admitted to Hospitalist Service from [facility] Emergency Room with complaint of SOB. Patient reportedly has past medical history significant for hypertension and dementia. Patient is a resident of [facility]. Further detail of HPI are not available. Patient's son is at bedside who is POA. He reports, patient had some diarrhea for 1-2 days but no fever. He was started on some antibiotics 2 days ago.     PMH/PSH/SH/FH/Meds: reviewed.    Symptoms/Review of Systems: In ICU, on BiPAP, 50% FIO2 Patient is lethargic, hypoxic and getting 100 % NRB. Needed use of Haldol due to combative and agitated behavior.  Diet:  NPO  Activity level: Up with assistance  Pain:  NAD    OBJECTIVE:   Vital Signs (Most Recent):      Temp: 99.5 °F (37.5 °C) (06/11/18 0306)  Pulse: (!) 118 (06/11/18 0719)  Resp: (!) 35 (06/11/18 0719)  BP: (!) 188/89 (06/11/18 0630)  SpO2: 97 % (06/11/18 0719)       Vital Signs Range (Last 24H):  Temp:  [97.8 °F (36.6 °C)-100 °F (37.8 °C)]   Pulse:  []   Resp:  [14-39]   BP: (173-207)/()   SpO2:  [89 %-100 %]     Weight: 108.4 kg (239 lb)  Body mass index is 31.53 kg/m².    Intake/Output Summary (Last 24 hours) at 06/11/18 0816  Last data filed at 06/11/18 0556   Gross per 24 hour   Intake           2978.7 ml   Output             2951 ml   Net             27.7 ml     Physical Examination:  General appearance: well developed, appears stated age, On BiPAP  Head: normocephalic, atraumatic  Eyes:  conjunctivae/corneas clear. PERRL.  Nose: Nares normal. Septum  midline.  Throat: lips, mucosa, and tongue normal; teeth and gums normal, no throat erythema.  Neck: supple, symmetrical, trachea midline, no JVD and thyroid not enlarged, symmetric, no tenderness/mass/nodules  Lungs:  clear to auscultation bilaterally and normal respiratory effort  Chest wall: no tenderness  Heart: regular rate and rhythm, S1, S2 normal, no murmur, click, rub or gallop  Abdomen: soft, non-tender non-distented; bowel sounds normal; no masses,  no organomegaly  Extremities: no cyanosis, clubbing or edema.   Pulses: 2+ and symmetric  Skin: Skin color, texture, turgor normal. No rashes or lesions.  Lymph nodes: Cervical, supraclavicular, and axillary nodes normal.  Neurologic: Advanced dementia, lethargic and unable to respond verbal commands    CBC:    Recent Labs  Lab 06/09/18  0321 06/10/18  0349 06/11/18  0319   WBC 17.00* 19.50* 15.70*   RBC 3.98* 4.09* 4.34*   HGB 12.2* 12.5* 13.2*   HCT 37.2* 37.9* 40.2    208 222   MCV 93 93 93   MCH 30.6 30.6 30.4   MCHC 32.8 33.0 32.8   BMP    Recent Labs  Lab 06/10/18  0349 06/10/18  1523 06/11/18  0319   * 138* 137*   * 156* 157*   K 4.4 3.7 3.9   * 115* 117*   CO2 29 28 27   BUN 58* 43* 35*   CREATININE 1.1 0.9 0.9   CALCIUM 9.0 9.2 9.1   MG 2.6  --   --       Diagnostic Results:  Microbiology Results (last 7 days)     Procedure Component Value Units Date/Time    Blood culture [[id number]] Collected:  06/10/18 1523    Order Status:  Sent Specimen:  Blood from Antecubital, Right Updated:  06/11/18 0012    Blood culture [[id number]] Collected:  06/10/18 1523    Order Status:  Sent Specimen:  Blood from Antecubital, Left  Arm Updated:  06/11/18 0012    Blood culture [[id number]]  (Susceptibility) Collected:  06/07/18 1501    Order Status:  Completed Specimen:  Blood Updated:  06/10/18 0755     Blood Culture, Routine Gram stain peds bottle: Gram positive cocci in chains resembling Strep       Blood Culture, Routine Positive results  previously called 06/08/2018  18:18     Blood Culture, Routine STREPTOCOCCUS PNEUMONIAE    Narrative:       Aerobic and anaerobic    Blood culture [[id number]] Collected:  06/07/18 1502    Order Status:  Completed Specimen:  Blood Updated:  06/10/18 0751     Blood Culture, Routine Gram stain peds bottle: Gram positive cocci in chains resembling Strep     Blood Culture, Routine Results called to and read back by: Juan Canales RN 06/08/2018  14:59     Blood Culture, Routine --     STREPTOCOCCUS PNEUMONIAE  For susceptibility see order # [id number]      Narrative:       Aerobic and anaerobic    Culture, Respiratory with Gram Stain [[id number]] Collected:  06/09/18 0453    Order Status:  Completed Specimen:  Respiratory from Tracheal Aspirate Updated:  06/10/18 0649     Respiratory Culture --     GRAM NEGATIVE LENA, NON-LACTOSE   Few  Identification and susceptibility pending       Gram Stain (Respiratory) <10 epithelial cells per low power field.     Gram Stain (Respiratory) Rare WBC's     Gram Stain (Respiratory) No organisms seen    Culture, Respiratory with Gram Stain [[id number]]     Order Status:  Canceled Specimen:  Respiratory          Chest X-Ray: Right lung predominant interstitial changes may represent asymmetric pulmonary edema or an atypical infectious process.    Assessment/Plan:      *Acute hypoxemic and hypercarbic respiratory failure [J96.01]   Yes    Pneumonia of right lower lobe due to GNR sp [J18.1]  Strep. Pneumoniae bacteremia  Supplemental O2 via nasal canula; titrate O2 saturation to >92%. Use BiPAP now. Follow Dr. Douglas's recommendations.  Continue beta 2 agonist bronchodilator treatments.   Continue IV antibiotics - Zosyn. Pharmacist to dose Vancomycin.  Continue routine medications as before.       Yes    Malnutrition of moderate degree [E44.0]  Encourage maximal PO intake. Diet supplementation ordered per nutrition approval. Will encourage PO and monitor closely for weight changes.       Yes    ANÍBAL (acute kidney injury) [N17.9] - resolved  Follow BMP.      Yes    Hypernatremia  Continue Dextrose infusion. Follow BMP.      Yes    Hypertension, uncontrolled [I10]  Tele-monitoring.  Chronic problem. Will continue chronic medications and monitor for any changes, adjusting as needed.  Use Hydralazine 10 mg IV q 2 hrs prn SBP > 160 mmHg.     Dementia  Dementia is controlled currently. Continue home dementia meds and non-pharmacologic interventions to prevent delirium (No VS between 11PM-5AM, activity during day, opening blinds, providing glasses/hearing aids, and up in chair during daytime). Use PRN anti-psychotics to prevent behavior of self harm during sundowning, and avoid narcotics and benzos unless absolutely necessary. PRN anti-psychotics prescribed to avoid self harm behaviors. On Aricept and Namenda.   Yes       DVT prophylaxis: Lovenox 40 mg SQ q day.    Code Status: Full code  Prognosis; poor    VTE Risk Mitigation         Ordered     enoxaparin injection 40 mg  Daily      06/07/18 1720     IP VTE HIGH RISK PATIENT  Once      06/07/18 1720     Place AME hose  Until discontinued      06/07/18 1720     Place sequential compression device  Until discontinued      06/07/18 1720        Aston Washington MD  Department of Hospital Medicine   [facility]    Addendum 12:00 noon.  I had a family meeting with patient's son and daughter in law. I updateed them regarding patient's overall condition not improving. They are agreed, patientis having a poor quality of life and likely is not going to do well. We discussed hospice care and they will be discussing amonst family and will let us know their wishes.We also discussed, if patient does bot swallow well, he may need PEG. Time spent in care of the patient, counseling and coordination of care (Greater than 50% spent in direct face to face contact): 35 min.

## 2018-06-11 NOTE — PLAN OF CARE
I called the pt's son, Yaron (ph#[phone]) for Dr Washington to discuss plan of care and pt condition with; he is on his way to the hospital and will be here within the hour. We will return to ICU when Yaron gets here to speak with him...Inessa Colin       06/11/18 1037   Discharge Reassessment   Assessment Type Discharge Planning Reassessment

## 2018-06-11 NOTE — PROGRESS NOTES
"Pt screaming out "you, you, you, you" or "redbull man, redbull" constantly over and over at the top of his lungs. Pulling off cardiac monitor and hospital gown, pulling at bipap mask even with bilateral wrist restraints in place. Haldol 2.5 mg IM given to right deltoid site as ordered for increasing agitation.   "

## 2018-06-11 NOTE — PROGRESS NOTES
Progress Note  PULMONARY    Admit Date: 6/7/2018 06/11/2018      SUBJECTIVE:     June 9, perseverates , on niv,    Zee 10, as above still, no c/o save not feeling good  June 11, same,     PFSH and Allergies reviewed.    OBJECTIVE:     Vitals (Most recent):  Vitals:    06/11/18 1635   BP: (!) 177/72   Pulse: 84   Resp: (!) 21   Temp:        Vitals (24 hour range):  Temp:  [99.4 °F (37.4 °C)-100 °F (37.8 °C)]   Pulse:  []   Resp:  [14-39]   BP: (134-207)/()   SpO2:  [89 %-99 %]       Intake/Output Summary (Last 24 hours) at 06/11/18 1756  Last data filed at 06/11/18 1600   Gross per 24 hour   Intake          3462.87 ml   Output             4151 ml   Net          -688.13 ml          Physical Exam:  The patient's neuro status (alertness,orientation,cognitive function,motor skills,), pharyngeal exam (oral lesions, hygiene, abn dentition,), Neck (jvd,mass,thyroid,nodes in neck and above/below clavicle),RESPIRATORY(symmetry,effort,fremitus,percussion,auscultation),  Cor(rhythm,heart tones including gallops,perfusion,edema)ABD(distention,hepatic&splenomegaly,tenderness,masses), Skin(rash,cyanosis),Psyc(affect,judgement,).  Exam negative except for these pertinent findings:    Alert but   confused, on niv, chest is symmetric, no distress, normal percussion, normal fremitus and good normal breath sounds  No edema, cor rrr, no murmur     Radiographs reviewed: view by direct vision  - impressive right lung infiltrate c/w aspiration pneumonia- no new 6/10  Results for orders placed during the hospital encounter of 06/07/18   X-Ray Chest 1 View    Narrative EXAMINATION:  XR CHEST 1 VIEW    CLINICAL HISTORY:  low o2 sat;    TECHNIQUE:  Single frontal view of the chest was performed.    COMPARISON:  Chest of June 7, 2018.    FINDINGS:  The cardiac size is within normal limits.  Calcification is noted in the aorta.  There is increased density in the right lower lobe infiltrate consistent with pneumonia and possible  small pleural effusion.  The left lung is relatively clear.  No pneumothorax is seen.      Impression Increasing density of right lower lobe infiltrate and probable pleural effusion.  Atherosclerosis.      Electronically signed by: Juan Collier MD  Date:    06/08/2018  Time:    10:51   ]    Labs       Recent Labs  Lab 06/11/18 0319   WBC 15.70*   HGB 13.2*   HCT 40.2      BAND 4.0       Recent Labs  Lab 06/11/18 0319 06/11/18  1125   * 156*  156*   K 3.9 3.5  3.5   * 116*  116*   CO2 27 30*  30*   BUN 35* 31*  31*   CREATININE 0.9 0.9  0.9   * 147*  147*   CALCIUM 9.1 9.2  9.2   AST 55*  --    ALT 41  --    ALKPHOS 74  --    BILITOT 0.9  --    PROT 6.6  --    ALBUMIN 2.0*  --      No results for input(s): PH, PCO2, PO2, HCO3 in the last 24 hours.  Microbiology Results (last 7 days)     Procedure Component Value Units Date/Time    Culture, Respiratory with Gram Stain [[id number]] Collected:  06/09/18 0453    Order Status:  Completed Specimen:  Respiratory from Tracheal Aspirate Updated:  06/11/18 1340     Respiratory Culture --     GRAM NEGATIVE LENA, NON-LACTOSE   Few  Identification and susceptibility pending       Respiratory Culture --     CANDIDA TROPICALIS  Moderate       Gram Stain (Respiratory) <10 epithelial cells per low power field.     Gram Stain (Respiratory) Rare WBC's     Gram Stain (Respiratory) No organisms seen    Blood culture [[id number]] Collected:  06/10/18 1523    Order Status:  Completed Specimen:  Blood from Antecubital, Right Updated:  06/11/18 0915     Blood Culture, Routine No Growth to date    Blood culture [[id number]] Collected:  06/10/18 1523    Order Status:  Completed Specimen:  Blood from Antecubital, Left  Arm Updated:  06/11/18 0915     Blood Culture, Routine No Growth to date    Blood culture [[id number]]  (Susceptibility) Collected:  06/07/18 1501    Order Status:  Completed Specimen:  Blood Updated:  06/10/18 0755     Blood Culture,  Routine Gram stain peds bottle: Gram positive cocci in chains resembling Strep       Blood Culture, Routine Positive results previously called 06/08/2018  18:18     Blood Culture, Routine STREPTOCOCCUS PNEUMONIAE    Narrative:       Aerobic and anaerobic    Blood culture [[id number]] Collected:  06/07/18 1502    Order Status:  Completed Specimen:  Blood Updated:  06/10/18 0751     Blood Culture, Routine Gram stain peds bottle: Gram positive cocci in chains resembling Strep     Blood Culture, Routine Results called to and read back by: Juan Canales RN 06/08/2018  14:59     Blood Culture, Routine --     STREPTOCOCCUS PNEUMONIAE  For susceptibility see order # [id number]      Narrative:       Aerobic and anaerobic    Culture, Respiratory with Gram Stain [[id number]]     Order Status:  Canceled Specimen:  Respiratory           Impression:  Active Hospital Problems    Diagnosis  POA    *Acute respiratory failure with hypoxia and hypercapnia [J96.01, J96.02]  Yes    Dementia with behavioral disturbance [F03.91]  Yes    Hypernatremia [E87.0]  No    Bacteremia [R78.81]  Yes    Pneumococcal bacteremia [R78.81]  Yes    COPD with acute lower respiratory infection [J44.0]  Yes    Pneumonia of right lower lobe due to infectious organism [J18.1]  Yes    Malnutrition of moderate degree [E44.0]  Yes    ANÍBAL (acute kidney injury) [N17.9]  Yes    Hypertension [I10]  Yes      Resolved Hospital Problems    Diagnosis Date Resolved POA    Hyponatremia [E87.1] 06/10/2018 Yes     Plan:   June 9, expect aspiration, still too unstable to get off bipap.  [place] remains poor.  Cont abx and observe.    Zee 10 , very alert , confused, on bipap still.  Stop vanc with gnr in sputum, and strep pneumo in blood.  [place] remains poor.      June 11, resp seems slowly better but bed bound and very demented.  Delirium issue.  May improve but doubt will do well.                              .

## 2018-06-11 NOTE — PROGRESS NOTES
RICARDO Lagunas NP notified of pts temp 100 ax. Pt had blood cultures done today . Also notified of pts elevated -200's today. Pt on BP meds at home and restarted

## 2018-06-11 NOTE — NURSING
0916 Moaning and restless this am.  Repeats same words.  Does not attempt to answer questions.  Spoke with Dr. Washington.  Informed of restlessness/agitation. Order received for Haldol 2.5mg IM x 1 dose.     0938 Haldol 2.5mg Im given for continued agitation.  See mar.     0956  Bipap off and placed on 6L HFNC.  maintaing O2 sats in the mid 90's.  Gave am meds crushed in applesauce.  Cough after 3rd bite, but has frequent cough already.      1008 Temp 100 axillary.  Tylenol 650mg po given.     1045  Resting quietly at present. Respirations unlabored.

## 2018-06-11 NOTE — PLAN OF CARE
"I was present with Dr Washington at the bedside when he discussed prognosis and plan of care with the son, Yaron and daughter in law. They are still not ready to make the pt a DNR or make the decision for a PEG tube vs hospice care at this time.   I called [facility] ([place])# [phone] to verify what level of function the pt would have to be at to return to their facility. I spoke with Duyen, who confirmed that the pt was "pretty functional" when he was there, he need reminders for ADL's but performed them independently. He did not talk much but made repetitive sounds and answered questions about time and self appropriately.   In order for him to return to [place] he would have to be mostly functional again and more importantly compliant with his oxygen. She states that he was not compliant before coming into the hospital and they are now concerned that might have led to him declining so quickly.   I spoke back with the pt's son, Yaron to discuss the above. I also introduced the idea that the pt may need to go into a nursing home at the time of discharge if he is going to need more care than [facility] can provide. He was very receptive to what I was explaining to him, he does realize that the pt would not qualify for Medicaid and they would pay for skilled nursing care in a nursing home if that was the route we ended up going. He wants to talk to [facility] and the pt's hospice nurse before deciding anything. I provided him with my name and number in case he has any questions. If not we will talk again tomorrow regarding the plan of care....OWEN Colin       06/11/18 9218   Discharge Reassessment   Assessment Type Discharge Planning Reassessment     "

## 2018-06-11 NOTE — PLAN OF CARE
06/11/18 0719   Patient Assessment/Suction   Level of Consciousness (AVPU) alert   All Lung Fields Breath Sounds coarse   Cough Frequency infrequent   Cough Type nonproductive   PRE-TX-O2-ETCO2   O2 Device (Oxygen Therapy) BiPAP   $ Is the patient on Low Flow Oxygen? Yes   Oxygen Concentration (%) 50   SpO2 97 %   Pulse Oximetry Type Continuous   $ Pulse Oximetry - Multiple Charge Pulse Oximetry - Multiple   Pulse (!) 118   Resp (!) 35   Aerosol Therapy   $ Aerosol Therapy Charges Aerosol Treatment   Respiratory Treatment Status given   SVN/Inhaler Treatment Route in-line   Position During Treatment HOB at 30 degrees   Patient Tolerance good   Post-Treatment   Post-treatment Heart Rate (beats/min) 119   Post-treatment Resp Rate (breaths/min) 34   All Fields Breath Sounds unchanged   Ready to Wean/Extubation Screen   FIO2<=50 (chart decimal) 0.5   Preset CPAP/BiPAP Settings   Mode Of Delivery BiPAP S/T   $ CPAP/BiPAP Daily Charge BiPAP/CPAP Daily   $ Initial CPAP/BiPAP Setup? No   $ Is patient using? Yes   Equipment Type V60   Airway Device Type total face mask   Humidifier not applicable   Ipap 18   EPAP (cm H2O) 8   Pressure Support (cm H2O) 10   Set Rate (Breaths/Min) 12   ITime (sec) 0.8   Rise Time (sec) 2   Patient CPAP/BiPAP Settings   Timed Inspiration (Sec) 0.8   IPAP Rise Time (sec) 2   RR Total (Breaths/Min) 35   Tidal Volume (mL) 684   VE Minute Ventilation (L/min) 23.4 L/min   Peak Inspiratory Pressure (cm H2O) 21   TiTOT (%) 35   Total Leak (L/Min) 25   Patient Trigger - ST Mode Only (%) 100

## 2018-06-11 NOTE — PLAN OF CARE
06/10/18 2000   Patient Assessment/Suction   Level of Consciousness (AVPU) responds to voice   Respiratory Effort Normal;Unlabored   Expansion/Accessory Muscles/Retractions expansion symmetric;no retractions;no use of accessory muscles   All Lung Fields Breath Sounds diminished   Cough Type none   PRE-TX-O2-ETCO2   O2 Device (Oxygen Therapy) BiPAP   Oxygen Concentration (%) 50   SpO2 97 %  (Simultaneous filing. User may not have seen previous data.)   Pulse Oximetry Type Continuous   Pulse 101  (Simultaneous filing. User may not have seen previous data.)   Resp (!) 35  (Simultaneous filing. User may not have seen previous data.)   BP (!) 199/87  (Simultaneous filing. User may not have seen previous data.)   Aerosol Therapy   $ Aerosol Therapy Charges Aerosol Treatment   Respiratory Treatment Status given   SVN/Inhaler Treatment Route in-line;with oxygen   Position During Treatment HOB at 45 degrees   Patient Tolerance good   Post-Treatment   Post-treatment Heart Rate (beats/min) 102   Post-treatment Resp Rate (breaths/min) 18   All Fields Breath Sounds unchanged   Ready to Wean/Extubation Screen   FIO2<=50 (chart decimal) 0.5   Preset CPAP/BiPAP Settings   Mode Of Delivery BiPAP   $ Initial CPAP/BiPAP Setup? No   $ Is patient using? Yes   Equipment Type V60   Airway Device Type total face mask   Humidifier not applicable   Ipap 18   EPAP (cm H2O) 8   Pressure Support (cm H2O) 10   ITime (sec) 0.8   Rise Time (sec) 3   Patient CPAP/BiPAP Settings   Timed Inspiration (Sec) 0.8   IPAP Rise Time (sec) 3   RR Total (Breaths/Min) 15   Tidal Volume (mL) 995   VE Minute Ventilation (L/min) 13.9 L/min   Peak Inspiratory Pressure (cm H2O) 16   TiTOT (%) 22   Total Leak (L/Min) 14   Patient Trigger - ST Mode Only (%) 91   CPAP/BiPAP Backup Settings   Backup Rate 12 breaths per minute (bpm)   CPAP/BiPAP Alarms   High Pressure (cm H2O) 19   Low Pressure (cm H2O) 9   Low Pressure Delay (Sec) 20   Minute Ventilation (L/Min) 3    High RR (breaths/min) 45   Low RR (breaths/min) 13

## 2018-06-11 NOTE — PLAN OF CARE
Problem: Patient Care Overview  Goal: Plan of Care Review  Outcome: Ongoing (interventions implemented as appropriate)  Yelling out frequently. Pulling at lines and restraints. Soft wrist restraints in use. Haldol 2.5 mg IM given this am. Slept for approx 1 & 1/2 hours after dose given. Respirations unlabored. Off Bipap this am. Maintaining adequate O2 sats on 6L HFNC. Congested cough. NT suction prn. Thick yellow, blood tinged secretions. NPO except for meds in applesauce. Mack intact with good urine output. Monitor BMP every 6 hours.  Safety maintained.

## 2018-06-11 NOTE — PROGRESS NOTES
Results for SARAH LAURENT (MRN [id number]) as of 6/11/2018 04:55   Ref. Range 6/11/2018 03:19   Sodium Latest Ref Range: 136 - 145 mmol/L 157 (H)   Notified RICARDO Lagunas NP per secure chat. Also made aware Hydralazine has not touched pts BP

## 2018-06-11 NOTE — PLAN OF CARE
"Problem: Patient Care Overview  Goal: Plan of Care Review  Outcome: Ongoing (interventions implemented as appropriate)  Temp max 100 ax ST on monitor with PVC 's. Pt on bipap throughout night with breaks for mouth care then on high flow nasal cannula. Several outbreaks with pt screaming out loud and yelling " I feel bad man I'm dying" repetively. NT suctioned this am per resp therapy. Pt coughs but swallows phlegm. NA level increased this am from yesterday. IVF changed. Hypertensive despite restarting routine BP meds. PRN hydralazine dose increased this am. Bilateral wrist restraints maintained as pt will grab at staff and tubings. Incontinent of stool . Safety measures in place no falls or injury.       "

## 2018-06-12 LAB
ALBUMIN SERPL BCP-MCNC: 2 G/DL
ALP SERPL-CCNC: 63 U/L
ALT SERPL W/O P-5'-P-CCNC: 42 U/L
ANION GAP SERPL CALC-SCNC: 10 MMOL/L
ANION GAP SERPL CALC-SCNC: 10 MMOL/L
ANION GAP SERPL CALC-SCNC: 11 MMOL/L
ANION GAP SERPL CALC-SCNC: 12 MMOL/L
ANION GAP SERPL CALC-SCNC: 9 MMOL/L
AST SERPL-CCNC: 45 U/L
BACTERIA SPEC AEROBE CULT: NORMAL
BACTERIA SPEC AEROBE CULT: NORMAL
BASOPHILS # BLD AUTO: 0 K/UL
BASOPHILS NFR BLD: 0.1 %
BILIRUB SERPL-MCNC: 0.7 MG/DL
BUN SERPL-MCNC: 25 MG/DL
BUN SERPL-MCNC: 26 MG/DL
BUN SERPL-MCNC: 26 MG/DL
CALCIUM SERPL-MCNC: 8.9 MG/DL
CALCIUM SERPL-MCNC: 9 MG/DL
CALCIUM SERPL-MCNC: 9 MG/DL
CALCIUM SERPL-MCNC: 9.1 MG/DL
CALCIUM SERPL-MCNC: 9.2 MG/DL
CHLORIDE SERPL-SCNC: 110 MMOL/L
CHLORIDE SERPL-SCNC: 111 MMOL/L
CHLORIDE SERPL-SCNC: 113 MMOL/L
CO2 SERPL-SCNC: 31 MMOL/L
CO2 SERPL-SCNC: 33 MMOL/L
CREAT SERPL-MCNC: 0.8 MG/DL
CREAT SERPL-MCNC: 0.9 MG/DL
DIFFERENTIAL METHOD: ABNORMAL
EOSINOPHIL # BLD AUTO: 0.1 K/UL
EOSINOPHIL NFR BLD: 0.7 %
ERYTHROCYTE [DISTWIDTH] IN BLOOD BY AUTOMATED COUNT: 16.2 %
EST. GFR  (AFRICAN AMERICAN): >60 ML/MIN/1.73 M^2
EST. GFR  (NON AFRICAN AMERICAN): >60 ML/MIN/1.73 M^2
GLUCOSE SERPL-MCNC: 155 MG/DL
GLUCOSE SERPL-MCNC: 157 MG/DL
GLUCOSE SERPL-MCNC: 163 MG/DL
GLUCOSE SERPL-MCNC: 177 MG/DL
GLUCOSE SERPL-MCNC: 177 MG/DL
GRAM STN SPEC: NORMAL
HCT VFR BLD AUTO: 40.3 %
HGB BLD-MCNC: 13.1 G/DL
LYMPHOCYTES # BLD AUTO: 1.3 K/UL
LYMPHOCYTES NFR BLD: 9.1 %
MAGNESIUM SERPL-MCNC: 1.6 MG/DL
MCH RBC QN AUTO: 30.4 PG
MCHC RBC AUTO-ENTMCNC: 32.6 G/DL
MCV RBC AUTO: 93 FL
MONOCYTES # BLD AUTO: 0.1 K/UL
MONOCYTES NFR BLD: 0.8 %
NEUTROPHILS # BLD AUTO: 12.8 K/UL
NEUTROPHILS NFR BLD: 89.3 %
PHOSPHATE SERPL-MCNC: 4.7 MG/DL
PLATELET # BLD AUTO: 240 K/UL
PMV BLD AUTO: 8.9 FL
POTASSIUM SERPL-SCNC: 3.6 MMOL/L
POTASSIUM SERPL-SCNC: 3.7 MMOL/L
PROT SERPL-MCNC: 6.2 G/DL
RBC # BLD AUTO: 4.33 M/UL
SODIUM SERPL-SCNC: 152 MMOL/L
SODIUM SERPL-SCNC: 153 MMOL/L
SODIUM SERPL-SCNC: 154 MMOL/L
SODIUM SERPL-SCNC: 154 MMOL/L
SODIUM SERPL-SCNC: 156 MMOL/L
WBC # BLD AUTO: 14.3 K/UL

## 2018-06-12 PROCEDURE — 97802 MEDICAL NUTRITION INDIV IN: CPT

## 2018-06-12 PROCEDURE — 25000242 PHARM REV CODE 250 ALT 637 W/ HCPCS: Performed by: INTERNAL MEDICINE

## 2018-06-12 PROCEDURE — 83735 ASSAY OF MAGNESIUM: CPT

## 2018-06-12 PROCEDURE — 99900035 HC TECH TIME PER 15 MIN (STAT)

## 2018-06-12 PROCEDURE — 25000003 PHARM REV CODE 250: Performed by: INTERNAL MEDICINE

## 2018-06-12 PROCEDURE — 63600175 PHARM REV CODE 636 W HCPCS: Performed by: INTERNAL MEDICINE

## 2018-06-12 PROCEDURE — 99233 SBSQ HOSP IP/OBS HIGH 50: CPT | Mod: ,,, | Performed by: INTERNAL MEDICINE

## 2018-06-12 PROCEDURE — G8997 SWALLOW GOAL STATUS: HCPCS | Mod: CL

## 2018-06-12 PROCEDURE — C9113 INJ PANTOPRAZOLE SODIUM, VIA: HCPCS | Performed by: INTERNAL MEDICINE

## 2018-06-12 PROCEDURE — 31720 CLEARANCE OF AIRWAYS: CPT

## 2018-06-12 PROCEDURE — 85025 COMPLETE CBC W/AUTO DIFF WBC: CPT

## 2018-06-12 PROCEDURE — 25000003 PHARM REV CODE 250: Performed by: NURSE PRACTITIONER

## 2018-06-12 PROCEDURE — 94640 AIRWAY INHALATION TREATMENT: CPT

## 2018-06-12 PROCEDURE — G8996 SWALLOW CURRENT STATUS: HCPCS | Mod: CN

## 2018-06-12 PROCEDURE — 63600175 PHARM REV CODE 636 W HCPCS: Performed by: NURSE PRACTITIONER

## 2018-06-12 PROCEDURE — 80048 BASIC METABOLIC PNL TOTAL CA: CPT

## 2018-06-12 PROCEDURE — 36415 COLL VENOUS BLD VENIPUNCTURE: CPT

## 2018-06-12 PROCEDURE — 20000000 HC ICU ROOM

## 2018-06-12 PROCEDURE — 99231 SBSQ HOSP IP/OBS SF/LOW 25: CPT | Mod: ,,, | Performed by: INTERNAL MEDICINE

## 2018-06-12 PROCEDURE — 94660 CPAP INITIATION&MGMT: CPT

## 2018-06-12 PROCEDURE — 84100 ASSAY OF PHOSPHORUS: CPT

## 2018-06-12 PROCEDURE — 80048 BASIC METABOLIC PNL TOTAL CA: CPT | Mod: 91

## 2018-06-12 PROCEDURE — 92526 ORAL FUNCTION THERAPY: CPT

## 2018-06-12 PROCEDURE — 80053 COMPREHEN METABOLIC PANEL: CPT

## 2018-06-12 PROCEDURE — 94761 N-INVAS EAR/PLS OXIMETRY MLT: CPT

## 2018-06-12 PROCEDURE — 27000221 HC OXYGEN, UP TO 24 HOURS

## 2018-06-12 RX ORDER — HALOPERIDOL 5 MG/ML
5 INJECTION INTRAMUSCULAR EVERY 8 HOURS PRN
Status: DISCONTINUED | OUTPATIENT
Start: 2018-06-12 | End: 2018-06-12

## 2018-06-12 RX ORDER — HALOPERIDOL 5 MG/ML
2.5 INJECTION INTRAMUSCULAR EVERY 8 HOURS PRN
Status: DISCONTINUED | OUTPATIENT
Start: 2018-06-12 | End: 2018-06-19 | Stop reason: HOSPADM

## 2018-06-12 RX ORDER — LORAZEPAM/0.9% SODIUM CHLORIDE 100MG/0.1L
2 PLASTIC BAG, INJECTION (ML) INTRAVENOUS ONCE
Status: COMPLETED | OUTPATIENT
Start: 2018-06-12 | End: 2018-06-12

## 2018-06-12 RX ADMIN — IPRATROPIUM BROMIDE AND ALBUTEROL SULFATE 3 ML: .5; 3 SOLUTION RESPIRATORY (INHALATION) at 07:06

## 2018-06-12 RX ADMIN — LEUCINE, PHENYLALANINE, LYSINE, METHIONINE, ISOLEUCINE, VALINE, HISTIDINE, THREONINE, TRYPTOPHAN, ALANINE, GLYCINE, ARGININE, PROLINE, SERINE, TYROSINE, SODIUM ACETATE, DIBASIC POTASSIUM PHOSPHATE, MAGNESIUM CHLORIDE, SODIUM CHLORIDE, CALCIUM CHLORIDE, DEXTROSE
201; 154; 159; 110; 165; 160; 132; 116; 50; 570; 283; 316; 187; 138; 11; 217; 261; 51; 112; 33; 5 INJECTION INTRAVENOUS at 06:06

## 2018-06-12 RX ADMIN — LAMOTRIGINE 25 MG: 25 TABLET ORAL at 08:06

## 2018-06-12 RX ADMIN — METOPROLOL TARTRATE 5 MG: 5 INJECTION, SOLUTION INTRAVENOUS at 12:06

## 2018-06-12 RX ADMIN — AMLODIPINE BESYLATE 10 MG: 5 TABLET ORAL at 10:06

## 2018-06-12 RX ADMIN — DONEPEZIL HYDROCHLORIDE 10 MG: 5 TABLET, FILM COATED ORAL at 08:06

## 2018-06-12 RX ADMIN — MEMANTINE HYDROCHLORIDE 5 MG: 5 TABLET ORAL at 08:06

## 2018-06-12 RX ADMIN — LAMOTRIGINE 25 MG: 25 TABLET ORAL at 10:06

## 2018-06-12 RX ADMIN — PANTOPRAZOLE SODIUM 40 MG: 40 INJECTION, POWDER, LYOPHILIZED, FOR SOLUTION INTRAVENOUS at 10:06

## 2018-06-12 RX ADMIN — IPRATROPIUM BROMIDE AND ALBUTEROL SULFATE 3 ML: .5; 3 SOLUTION RESPIRATORY (INHALATION) at 12:06

## 2018-06-12 RX ADMIN — PIPERACILLIN AND TAZOBACTAM 3.38 G: 3; .375 INJECTION, POWDER, LYOPHILIZED, FOR SOLUTION INTRAVENOUS; PARENTERAL at 05:06

## 2018-06-12 RX ADMIN — MEMANTINE HYDROCHLORIDE 5 MG: 5 TABLET ORAL at 10:06

## 2018-06-12 RX ADMIN — HALOPERIDOL LACTATE 2.5 MG: 5 INJECTION, SOLUTION INTRAMUSCULAR at 09:06

## 2018-06-12 RX ADMIN — HYDRALAZINE HYDROCHLORIDE 20 MG: 20 INJECTION INTRAMUSCULAR; INTRAVENOUS at 09:06

## 2018-06-12 RX ADMIN — MAGNESIUM SULFATE HEPTAHYDRATE 2 G: 40 INJECTION, SOLUTION INTRAVENOUS at 05:06

## 2018-06-12 RX ADMIN — PIPERACILLIN AND TAZOBACTAM 3.38 G: 3; .375 INJECTION, POWDER, LYOPHILIZED, FOR SOLUTION INTRAVENOUS; PARENTERAL at 12:06

## 2018-06-12 RX ADMIN — METOPROLOL TARTRATE 5 MG: 5 INJECTION, SOLUTION INTRAVENOUS at 05:06

## 2018-06-12 RX ADMIN — LISINOPRIL 40 MG: 40 TABLET ORAL at 10:06

## 2018-06-12 RX ADMIN — TRAMADOL HYDROCHLORIDE 50 MG: 50 TABLET, FILM COATED ORAL at 10:06

## 2018-06-12 RX ADMIN — QUETIAPINE FUMARATE 25 MG: 25 TABLET ORAL at 08:06

## 2018-06-12 RX ADMIN — LABETALOL HYDROCHLORIDE 20 MG: 5 INJECTION, SOLUTION INTRAVENOUS at 10:06

## 2018-06-12 RX ADMIN — IPRATROPIUM BROMIDE AND ALBUTEROL SULFATE 3 ML: .5; 3 SOLUTION RESPIRATORY (INHALATION) at 01:06

## 2018-06-12 RX ADMIN — ENOXAPARIN SODIUM 40 MG: 100 INJECTION SUBCUTANEOUS at 05:06

## 2018-06-12 RX ADMIN — PIPERACILLIN AND TAZOBACTAM 3.38 G: 3; .375 INJECTION, POWDER, LYOPHILIZED, FOR SOLUTION INTRAVENOUS; PARENTERAL at 09:06

## 2018-06-12 RX ADMIN — FOLIC ACID 1 MG: 1 TABLET ORAL at 10:06

## 2018-06-12 RX ADMIN — METOPROLOL TARTRATE 5 MG: 5 INJECTION, SOLUTION INTRAVENOUS at 11:06

## 2018-06-12 RX ADMIN — METOPROLOL TARTRATE 5 MG: 5 INJECTION, SOLUTION INTRAVENOUS at 06:06

## 2018-06-12 NOTE — PLAN OF CARE
Problem: Nutrition, Parenteral (Adult)  Intervention: Monitor/Manage Parenteral Nutrition Support  Recommend:  1) Transition from PPN to  Enteral: Impact Peptide 1.5 @ 10 mls/hr to progress by 10 mls Q 6 hrs to goal 50 mls/hr. Flush with 150 mls Q4 hrs.  Provides 1800 calories, 113 gms protein, 168 gms CHO, 924 mls free water.   2) If PPN is continued, add 20% lipids (250 mls) to increase intake to 58% of EEN.  (current Clinimix E 2.75/5 @ 100 mls without lipids provides 34% EEN).  Progress to cardiac diet with texture per SLP when medically appropriate.  Goals: Pt will receive at least 80% EEN within 72 hrs.   Nutrition Goal Status: new  Communication of AMBER Recs: reviewed with RN

## 2018-06-12 NOTE — PROGRESS NOTES
Progress Note  Hospital Medicine  Patient Name:Angel Pineda  MRN:  [id number]  Patient Class: IP- Inpatient  Admit Date: 6/7/2018  Length of Stay: 5 days  Expected Discharge Date:   Attending Physician: Aston Washington MD  Primary Care Provider:  Primary Doctor No    SUBJECTIVE:     Principal Problem: Acute respiratory failure with hypoxia and hypercapnia  Initial history of present illness: Patient is a 73 y.o. male admitted to Hospitalist Service from [facility] Emergency Room with complaint of SOB. Patient reportedly has past medical history significant for hypertension and dementia. Patient is a resident of [facility]. Further detail of HPI are not available. Patient's son is at bedside who is POA. He reports, patient had some diarrhea for 1-2 days but no fever. He was started on some antibiotics 2 days ago.     PMH/PSH/SH/FH/Meds: reviewed.    Symptoms/Review of Systems: In ICU, patient is less restless, repeating same word, non-communicative. NGT placed and receiving free water supplementation, Hypernatremia is slightly better.  Diet:  NPO  Activity level: Up with assistance  Pain:  NAD    OBJECTIVE:   Vital Signs (Most Recent):      Temp: 98.9 °F (37.2 °C) (06/12/18 0000)  Pulse: 87 (06/12/18 0711)  Resp: 20 (06/12/18 0711)  BP: (!) 159/87 (06/12/18 0600)  SpO2: 96 % (06/12/18 0711)       Vital Signs Range (Last 24H):  Temp:  [98.9 °F (37.2 °C)-100 °F (37.8 °C)]   Pulse:  []   Resp:  [15-58]   BP: (126-208)/()   SpO2:  [87 %-100 %]     Weight: 104.5 kg (230 lb 6.1 oz)  Body mass index is 30.4 kg/m².    Intake/Output Summary (Last 24 hours) at 06/12/18 0920  Last data filed at 06/12/18 0600   Gross per 24 hour   Intake          1620.84 ml   Output             2450 ml   Net          -829.16 ml     Physical Examination:  General appearance: well developed, appears stated age, On BiPAP  Head: normocephalic, atraumatic  Eyes:  conjunctivae/corneas clear. PERRL.  Nose: Nares normal.  Septum midline.  Throat: lips, mucosa, and tongue normal; teeth and gums normal, no throat erythema.  Neck: supple, symmetrical, trachea midline, no JVD and thyroid not enlarged, symmetric, no tenderness/mass/nodules  Lungs:  clear to auscultation bilaterally and normal respiratory effort  Chest wall: no tenderness  Heart: regular rate and rhythm, S1, S2 normal, no murmur, click, rub or gallop  Abdomen: soft, non-tender non-distented; bowel sounds normal; no masses,  no organomegaly  Extremities: no cyanosis, clubbing or edema.   Pulses: 2+ and symmetric  Skin: Skin color, texture, turgor normal. No rashes or lesions.  Lymph nodes: Cervical, supraclavicular, and axillary nodes normal.  Neurologic: Advanced dementia, lethargic and unable to respond verbal commands    CBC:    Recent Labs  Lab 06/10/18  0349 06/11/18  0319 06/12/18  0323   WBC 19.50* 15.70* 14.30*   RBC 4.09* 4.34* 4.33*   HGB 12.5* 13.2* 13.1*   HCT 37.9* 40.2 40.3    222 240   MCV 93 93 93   MCH 30.6 30.4 30.4   MCHC 33.0 32.8 32.6   BMP    Recent Labs  Lab 06/10/18  0349  06/11/18  1826 06/12/18  0012 06/12/18  0323   *  < > 144* 163* 177*  177*   *  < > 156* 156* 154*  154*   K 4.4  < > 3.6 3.6 3.6  3.6   *  < > 115* 113* 113*  113*   CO2 29  < > 30* 31* 31*  31*   BUN 58*  < > 26* 25* 25*  25*   CREATININE 1.1  < > 0.8 0.9 0.9  0.9   CALCIUM 9.0  < > 9.1 9.1 9.0  9.0   MG 2.6  --   --   --   --    < > = values in this interval not displayed.   Diagnostic Results:  Microbiology Results (last 7 days)     Procedure Component Value Units Date/Time    Blood culture [[id number]] Collected:  06/10/18 1523    Order Status:  Completed Specimen:  Blood from Antecubital, Right Updated:  06/12/18 0612     Blood Culture, Routine No Growth to date     Blood Culture, Routine No Growth to date    Blood culture [[id number]] Collected:  06/10/18 1523    Order Status:  Completed Specimen:  Blood from Antecubital, Left  Arm Updated:   06/12/18 0612     Blood Culture, Routine No Growth to date     Blood Culture, Routine No Growth to date    Culture, Respiratory with Gram Stain [[id number]] Collected:  06/09/18 0453    Order Status:  Completed Specimen:  Respiratory from Tracheal Aspirate Updated:  06/11/18 1340     Respiratory Culture --     GRAM NEGATIVE LENA, NON-LACTOSE   Few  Identification and susceptibility pending       Respiratory Culture --     CANDIDA TROPICALIS  Moderate       Gram Stain (Respiratory) <10 epithelial cells per low power field.     Gram Stain (Respiratory) Rare WBC's     Gram Stain (Respiratory) No organisms seen    Blood culture [[id number]]  (Susceptibility) Collected:  06/07/18 1501    Order Status:  Completed Specimen:  Blood Updated:  06/10/18 0755     Blood Culture, Routine Gram stain peds bottle: Gram positive cocci in chains resembling Strep       Blood Culture, Routine Positive results previously called 06/08/2018  18:18     Blood Culture, Routine STREPTOCOCCUS PNEUMONIAE    Narrative:       Aerobic and anaerobic    Blood culture [[id number]] Collected:  06/07/18 1502    Order Status:  Completed Specimen:  Blood Updated:  06/10/18 0751     Blood Culture, Routine Gram stain peds bottle: Gram positive cocci in chains resembling Strep     Blood Culture, Routine Results called to and read back by: Juan Canales RN 06/08/2018  14:59     Blood Culture, Routine --     STREPTOCOCCUS PNEUMONIAE  For susceptibility see order # [id number]      Narrative:       Aerobic and anaerobic    Culture, Respiratory with Gram Stain [[id number]]     Order Status:  Canceled Specimen:  Respiratory          Chest X-Ray: Right lung predominant interstitial changes may represent asymmetric pulmonary edema or an atypical infectious process.    ECHO:  · The left ventricle cavity is normal.  · Left ventricle ejection fraction is at 55%  · Normal LV diastolic function.  · RV systolic function is normal.  · Left atrium is mildly  dilated.  · RA cavity size is normal.  · Mitral vavlve is normal.  · Aortic valve is normal.  · Pericardium is normal.    Assessment/Plan:      *Acute hypoxemic and hypercarbic respiratory failure [J96.01]   Yes    Pneumonia of right lower lobe due to GNR sp [J18.1]  Strep. Pneumoniae bacteremia  Supplemental O2 via nasal canula; titrate O2 saturation to >92%. Use BiPAP now. Follow Dr. Douglas's recommendations.  Continue beta 2 agonist bronchodilator treatments.   Continue IV antibiotics - Zosyn. Pharmacist to dose Vancomycin.  Continue routine medications as before.       Yes    Malnutrition of moderate degree [E44.0]  Encourage maximal PO intake. Diet supplementation ordered per nutrition approval. Will encourage PO and monitor closely for weight changes.      Yes    ANÍBAL (acute kidney injury) [N17.9] - resolved  Follow BMP.      Yes    Hypernatremia  Continue Dextrose infusion. Follow BMP.  St to evaluate for resumption of diet Vs NGT feeding with increased free-water.       Yes    Hypertension, uncontrolled [I10]  Tele-monitoring.  Chronic medications and monitor for any changes, adjusting as needed.  Use Hydralazine 10 mg IV q 2 hrs prn SBP > 160 mmHg.     Dementia  Dementia is controlled currently. Continue home dementia meds and non-pharmacologic interventions to prevent delirium (No VS between 11PM-5AM, activity during day, opening blinds, providing glasses/hearing aids, and up in chair during daytime). Use PRN anti-psychotics to prevent behavior of self harm during sundowning, and avoid narcotics and benzos unless absolutely necessary. PRN anti-psychotics prescribed to avoid self harm behaviors. On Aricept and Namenda.   Yes       DVT prophylaxis: Lovenox 40 mg SQ q day.    Code Status: Full code  Prognosis; poor    VTE Risk Mitigation         Ordered     enoxaparin injection 40 mg  Daily      06/07/18 1720     IP VTE HIGH RISK PATIENT  Once      06/07/18 1720     Place AME hose  Until discontinued       06/07/18 1720     Place sequential compression device  Until discontinued      06/07/18 1720        Aston Washington MD  Department of Hospital Medicine   [facility]    Addendum 12:00 noon.  I had a family meeting with patient's son and daughter in law. I updateed them regarding patient's overall condition not improving. They are agreed, patientis having a poor quality of life and likely is not going to do well. We discussed hospice care and they will be discussing amonst family and will let us know their wishes.We also discussed, if patient does bot swallow well, he may need PEG. Time spent in care of the patient, counseling and coordination of care (Greater than 50% spent in direct face to face contact): 35 min.

## 2018-06-12 NOTE — PLAN OF CARE
Problem: Patient Care Overview  Goal: Plan of Care Review  Ensured patient safety with frequent checks, assessing pain with nonverbal indicators. Patient is constantly reoriented and continues to repeat mumbling phrases. Patient wore bipap mask all night. Remains in restraints, with q 2 hour checks. Patient remains with two piv intact and infusing kvo and clinimix at 100 ml/hr. Patient remains with boone catheter and adequate uop noted. Bowel movement noted and ngt placed with q 4 hour 120 ml free water flushes completed. Will continue to monitor.

## 2018-06-12 NOTE — PLAN OF CARE
06/11/18 1920   Patient Assessment/Suction   Level of Consciousness (AVPU) alert   Respiratory Effort Normal;Unlabored   Expansion/Accessory Muscles/Retractions expansion symmetric;no retractions;no use of accessory muscles   All Lung Fields Breath Sounds diminished   PRE-TX-O2-ETCO2   O2 Device (Oxygen Therapy) High Flow nasal Cannula   Oxygen Concentration (%) 6   SpO2 96 %   Pulse Oximetry Type Continuous   Pulse 97   Resp (!) 21   Aerosol Therapy   $ Aerosol Therapy Charges Aerosol Treatment   Respiratory Treatment Status given   SVN/Inhaler Treatment Route mask;with oxygen   Position During Treatment HOB at 45 degrees   Patient Tolerance good   Post-Treatment   Post-treatment Heart Rate (beats/min) 97   Post-treatment Resp Rate (breaths/min) 16   All Fields Breath Sounds unchanged   Ready to Wean/Extubation Screen   FIO2<=50 (chart decimal) 0.06   Preset CPAP/BiPAP Settings   Mode Of Delivery Standby

## 2018-06-12 NOTE — PHYSICIAN QUERY
PT Name: Angel Pineda  MR #: [id number]     Physician Query Form - Documentation Clarification      CDS/: Elsie Mao   RN CCDS            Contact information:[email or web address]    This form is a permanent document in the medical record.     Query Date: June 11, 2018    By submitting this query, we are merely seeking further clarification of documentation. Please utilize your independent clinical judgment when addressing the question(s) below.    The Medical record reflects the following:    Supporting Clinical Findings Location in Medical Record     Sepsis considered, bundle added on. Patient meets sepsis criteria, appears to have a right-sided pneumonia.    Vital Signs (Most Recent)  Pulse: (!) 114 (06/07/18 1610)  Resp: 20 (06/07/18 1456)  BP: (!) 121/58 (06/07/18 1601)  SpO2: (!) 89 % (06/07/18 1610)    WBC= 12.00, 13.40, 17.00, 19.50   ED MD        VS on admit              Lab 6/7-6/10    Pneumonia of right lower lobe due to GNR sp [J18.1]  Strep. Pneumoniae bacteremia  Supplemental O2 via nasal canula; titrate O2 saturation to >92%. Use BiPAP now. Follow Dr. Douglas's recommendations.  Continue beta 2 agonist bronchodilator treatments.   Continue IV antibiotics - Zosyn. Pharmacist to dose Vancomycin.  Continue routine medications as before.      ANÍBAL    Acute Respiratory Failure       PN 6/11                                                                            Please clarify the possible sepsis noted by ER physician.  Thank you.    Provider Use Only        [   x  ] Severe Strep Pneumoniae Sepsis ruled in    [     ] Strep Pneumoniae Sepsis ruled in    [     ] Sepsis ruled out    [     ] Other__________________                                                                                                                       [  ] Clinically undetermined

## 2018-06-12 NOTE — PLAN OF CARE
Problem: Patient Care Overview  Goal: Plan of Care Review  Outcome: Ongoing (interventions implemented as appropriate)  Pt on bipap as ordered with breaks with 6L HFNC and Q6 duoneb treatments

## 2018-06-12 NOTE — PT/OT/SLP PROGRESS
"Speech Language Pathology Treatment    Patient Name:  Angel Pineda   MRN:  [id number]  Admitting Diagnosis: Acute respiratory failure with hypoxia and hypercapnia    Recommendations:                 General Recommendations:  Modified barium swallow study  Diet recommendations:  NPO, Liquid Diet Level: NPO   Aspiration Precautions: Frequent oral care and Ice chips sparingly   General Precautions: Standard, fall, aspiration  Communication strategies:  yes/no questions only, provide increased time to answer and attempt to orient    Subjective     "I feel bad"  "My teeth."  Perseverating    Pain/Comfort:  · Pain Rating 1:  ("I feel bad. My teeth." Unable to rate 2/2 dementia)    Objective:   Pt seen in ICU for ongoing swallowing assessment. Pt opens eyes to voice. bilateral wrist restraints noted. Does not follow basic commands. Strong, spontaneous cough noted, but unproductive. He appeared quite anxious as judged by perseveration of possible tooth discomfort and stiffening of arms. Lower, front tooth very loose. Pt given 3 ice chips, dipped tsp of nectar thick liquids and 1/2 tsp nectar thick liquids. Multiple spontaneous swallows and delayed cough noted across all trials.     Has the patient been evaluated by SLP for swallowing?   Yes  Keep patient NPO? Yes   Current Respiratory Status: nasal cannula (high flow)      Assessment:     Angel Pineda is a 73 y.o. male with an SLP diagnosis of Dysphagia and Cognitive-Linguistic Impairment.  He presents with continued s/s pharyngeal dysphagia. REC Pt remain NPO and MBSS once pt more alert/medically stable. .    Goals:    SLP Goals        Problem: SLP Goal    Goal Priority Disciplines Outcome   SLP Goal     SLP Ongoing (interventions implemented as appropriate)   Description:  1. Participate in ongoing assessment of swallow function to determine least restrictive means of nutrition/hydration                    Plan:     · Patient to be seen:  5 x/week   · Plan of " Care expires:  06/16/18  · Plan of Care reviewed with:  patient (Nsg)   · SLP Follow-Up:  Yes       Discharge recommendations:  nursing facility, basic       Time Tracking:     SLP Treatment Date:   06/12/18  Speech Start Time:  1340  Speech Stop Time:  1357     Speech Total Time (min):  17 min    Billable Minutes: Treatment Swallowing Dysfunction 17 and Total Time 17    Erika Rod CCC-SLP  06/12/2018

## 2018-06-12 NOTE — PLAN OF CARE
Problem: SLP Goal  Goal: SLP Goal  1. Participate in ongoing assessment of swallow function to determine least restrictive means of nutrition/hydration   Outcome: Ongoing (interventions implemented as appropriate)    Pt seen for ongoing swallow evaluation. Pt remains confused and continues with s/s pharyngeal dysphagia. REC Pt remain NPO and MBSS once pt more alert/medically stable.     Erika Rod MS, CCC/SLP 6/12/2018

## 2018-06-12 NOTE — PROGRESS NOTES
Progress Note  PULMONARY    Admit Date: 6/7/2018 06/12/2018      SUBJECTIVE:     June 9, perseverates , on niv,    Zee 10, as above still, no c/o save not feeling good  June 11, same,   June 12, much calmer but still very confused.      PFSH and Allergies reviewed.    OBJECTIVE:     Vitals (Most recent):  Vitals:    06/12/18 1330   BP: (!) 147/71   Pulse: 94   Resp: (!) 25   Temp:        Vitals (24 hour range):  Temp:  [98.9 °F (37.2 °C)-99.5 °F (37.5 °C)]   Pulse:  []   Resp:  [15-70]   BP: (126-208)/(62-91)   SpO2:  [87 %-100 %]       Intake/Output Summary (Last 24 hours) at 06/12/18 1616  Last data filed at 06/12/18 1159   Gross per 24 hour   Intake          1486.67 ml   Output             2275 ml   Net          -788.33 ml          Physical Exam:  The patient's neuro status (alertness,orientation,cognitive function,motor skills,), pharyngeal exam (oral lesions, hygiene, abn dentition,), Neck (jvd,mass,thyroid,nodes in neck and above/below clavicle),RESPIRATORY(symmetry,effort,fremitus,percussion,auscultation),  Cor(rhythm,heart tones including gallops,perfusion,edema)ABD(distention,hepatic&splenomegaly,tenderness,masses), Skin(rash,cyanosis),Psyc(affect,judgement,).  Exam negative except for these pertinent findings:    Alert but   confused, on niv, chest is symmetric, no distress, normal percussion, normal fremitus and good normal breath sounds  No edema, cor rrr, no murmur     Radiographs reviewed: view by direct vision  - impressive right lung infiltrate c/w aspiration pneumonia- no new 6/10  Results for orders placed during the hospital encounter of 06/07/18   X-Ray Chest 1 View    Narrative EXAMINATION:  XR CHEST 1 VIEW    CLINICAL HISTORY:  low o2 sat;    TECHNIQUE:  Single frontal view of the chest was performed.    COMPARISON:  Chest of June 7, 2018.    FINDINGS:  The cardiac size is within normal limits.  Calcification is noted in the aorta.  There is increased density in the right lower lobe  infiltrate consistent with pneumonia and possible small pleural effusion.  The left lung is relatively clear.  No pneumothorax is seen.      Impression Increasing density of right lower lobe infiltrate and probable pleural effusion.  Atherosclerosis.      Electronically signed by: Juan Collier MD  Date:    06/08/2018  Time:    10:51   ]    Labs       Recent Labs  Lab 06/12/18 0323   WBC 14.30*   HGB 13.1*   HCT 40.3          Recent Labs  Lab 06/12/18  0323 06/12/18  1138   *  154* 153*   K 3.6  3.6 3.6   *  113* 111*   CO2 31*  31* 33*   BUN 25*  25* 26*   CREATININE 0.9  0.9 0.8   *  177* 157*   CALCIUM 9.0  9.0 8.9   MG  --  1.6   PHOS  --  4.7*   AST 45*  --    ALT 42  --    ALKPHOS 63  --    BILITOT 0.7  --    PROT 6.2  --    ALBUMIN 2.0*  --      No results for input(s): PH, PCO2, PO2, HCO3 in the last 24 hours.  Microbiology Results (last 7 days)     Procedure Component Value Units Date/Time    Culture, Respiratory with Gram Stain [[id number]]  (Susceptibility) Collected:  06/09/18 0453    Order Status:  Completed Specimen:  Respiratory from Tracheal Aspirate Updated:  06/12/18 0954     Respiratory Culture --     ESCHERICHIA COLI  Few       Respiratory Culture --     CANDIDA TROPICALIS  Moderate       Gram Stain (Respiratory) <10 epithelial cells per low power field.     Gram Stain (Respiratory) Rare WBC's     Gram Stain (Respiratory) No organisms seen    Blood culture [[id number]] Collected:  06/10/18 1523    Order Status:  Completed Specimen:  Blood from Antecubital, Right Updated:  06/12/18 0612     Blood Culture, Routine No Growth to date     Blood Culture, Routine No Growth to date    Blood culture [[id number]] Collected:  06/10/18 1523    Order Status:  Completed Specimen:  Blood from Antecubital, Left  Arm Updated:  06/12/18 0612     Blood Culture, Routine No Growth to date     Blood Culture, Routine No Growth to date    Blood culture [[id number]]  (Susceptibility)  Collected:  06/07/18 1501    Order Status:  Completed Specimen:  Blood Updated:  06/10/18 0755     Blood Culture, Routine Gram stain peds bottle: Gram positive cocci in chains resembling Strep       Blood Culture, Routine Positive results previously called 06/08/2018  18:18     Blood Culture, Routine STREPTOCOCCUS PNEUMONIAE    Narrative:       Aerobic and anaerobic    Blood culture [[id number]] Collected:  06/07/18 1502    Order Status:  Completed Specimen:  Blood Updated:  06/10/18 0751     Blood Culture, Routine Gram stain peds bottle: Gram positive cocci in chains resembling Strep     Blood Culture, Routine Results called to and read back by: Juan Canales RN 06/08/2018  14:59     Blood Culture, Routine --     STREPTOCOCCUS PNEUMONIAE  For susceptibility see order # [id number]      Narrative:       Aerobic and anaerobic    Culture, Respiratory with Gram Stain [[id number]]     Order Status:  Canceled Specimen:  Respiratory           Impression:  Active Hospital Problems    Diagnosis  POA    *Acute respiratory failure with hypoxia and hypercapnia [J96.01, J96.02]  Yes    Dementia with behavioral disturbance [F03.91]  Yes    Hypernatremia [E87.0]  No    Bacteremia [R78.81]  Yes    Pneumococcal bacteremia [R78.81]  Yes    COPD with acute lower respiratory infection [J44.0]  Yes    Pneumonia of right lower lobe due to infectious organism [J18.1]  Yes    Malnutrition of moderate degree [E44.0]  Yes    ANÍBAL (acute kidney injury) [N17.9]  Yes    Hypertension [I10]  Yes      Resolved Hospital Problems    Diagnosis Date Resolved POA    Hyponatremia [E87.1] 06/10/2018 Yes     Plan:   June 9, expect aspiration, still too unstable to get off bipap.  [place] remains poor.  Cont abx and observe.    Zee 10 , very alert , confused, on bipap still.  Stop vanc with gnr in sputum, and strep pneumo in blood.  [place] remains poor.      June 11, resp seems slowly better but bed bound and very demented.  Delirium issue.  May  improve but doubt will do well.  June 12, ecoli in sputum and pneumococcus in blood.  No new recs                          .

## 2018-06-12 NOTE — PROGRESS NOTES
Called Olegario Carnes NP and explained the placement of the NG tube and the findings of the V-Rad report stating the tube is in place with the distal tip likely terminating in the gastric body. TORB to use the NG tube. Initiation of the ordered free water flushes to start.

## 2018-06-12 NOTE — ASSESSMENT & PLAN NOTE
Malnutrition in the context of acute on chronic illness    Related to (etiology):  Pulmonary dysfunction and inability to consume sufficient energy     Signs and Symptoms (as evidenced by):  Weight loss: 4.39% x 2 1/2 weeks (5/24/18, 109.3 kg and 6/11/18, 104.5 kg)   Energy Intake: <50% of estimated energy requirement for >5 days   Fluid Accumulation: mild, edema 2+  Wound: Stage 2 sacral spine pressure injury    Interventions/Recommendations (treatment strategy):  Initiate nutrition support    Nutrition Diagnosis Status:  New

## 2018-06-12 NOTE — NURSING
Notified Dr. Washington of continued agitation and elevated blood pressure.  Na remains elevated.  Awaiting 1800 lab results.  New orders received.

## 2018-06-12 NOTE — PHYSICIAN QUERY
PT Name: Angel Pineda  MR #: [id number]     Physician Query Form - Documentation Clarification      CDS/: Elsie Mao  RN CCDS             Contact information:[email or web address]    This form is a permanent document in the medical record.     Query Date: June 11, 2018    By submitting this query, we are merely seeking further clarification of documentation. Please utilize your independent clinical judgment when addressing the question(s) below.    The Medical record reflects the following:    Supporting Clinical Findings Location in Medical Record   Angel Pineda is a 73 y.o. male with HTN, CHF who presents to the ED via EMS for shortness of breath      There is increased density in the right lower lobe infiltrate consistent with pneumonia and possible small pleural effusion. Increasing density of right lower lobe infiltrate and probable pleural effusion    EFY=[id number] ED MD /Pulmonology CN          Pulmonlogy CN              Lab 6/7     Conclusion   · The left ventricle cavity is normal.   · Left ventricle ejection fraction is at 55%   · Normal LV diastolic function.   · RV systolic function is normal.   · Left atrium is mildly &dilated.   · RA cavity size is normal.   · Mitral vavlve is normal.   · Aortic valve is normal.   · Pericardium is normal.          SHONA 6/11                                                                 Please clarify the CHF.  Thank you.      Provider Use Only      [  x  ] Chronic Diastolic CHF    [    ] Chronic Systolic CHF    [    ] Chronic Combined CHF    [    ] CHF ruled out    [    ] Other________________________                                                                                                                         [  ] Clinically undetermined

## 2018-06-12 NOTE — PROGRESS NOTES
" [facility]  Adult Nutrition  Progress Note    SUMMARY       Recommendations    Recommend:  1) Transition from PPN to  Enteral: Impact Peptide 1.5 @ 10 mls/hr to progress by 10 mls Q 6 hrs to goal 50 mls/hr. Flush with 150 mls Q4 hrs.  Provides 1800 calories, 113 gms protein, 168 gms CHO, 924 mls free water.   2) If PPN is continued, add 20% lipids (250 mls) to increase intake to 58% of EEN.  (current Clinimix E 2.75/5 @ 100 mls without lipids provides 34% EEN).  Progress to cardiac diet with texture per SLP when medically appropriate.  Goals: Pt will receive at least 80% EEN within 72 hrs.   Nutrition Goal Status: new  Communication of RD Recs: reviewed with RN    Reason for Assessment    Reason for Assessment: new TPN  1. Pneumonia of right lower lobe due to infectious organism    2. Shortness of breath    3. Bacteremia      Past Medical History:   Diagnosis Date    CHF (congestive heart failure)     COPD (chronic obstructive pulmonary disease)     Dementia     Hypertension      General Information Comments: Admitted with SOB. Per chart pt had diarrhea x 2 days prior to admit.      Nutrition Risk Screen    Nutrition Risk Screen: no indicators present    Nutrition/Diet History    Do you have any cultural, spiritual, Jain conflicts, given your current situation?: ARNOLDO  Food Allergies: NKFA  Factors Affecting Nutritional Intake: NPO (per SLP rec)    Anthropometrics    Temp: 99.4 °F (37.4 °C)  Height Method: Measured  Height: 6' 1"  Height (inches): 73 in  Weight Method: Bed Scale  Weight: 104.5 kg (230 lb 6.1 oz)  Weight (lb): 230.38 lb  Ideal Body Weight (IBW), Male: 184 lb  % Ideal Body Weight, Male (lb): 125.21 lb  BMI (Calculated): 30.5  BMI Grade: 30 - 34.9- obesity - grade I  Weight Loss: unintentional  Usual Body Weight (UBW), k.3 kg  % Usual Body Weight: 95.81  % Weight Change From Usual Weight: -4.39 %  Weight Loss Since Admission: 3 lb 12 oz (1.97% x 6 days)  % Weight " Change Since Admission: 1.63       Lab/Procedures/Meds    Pertinent Labs Reviewed: reviewed  BMP  Lab Results   Component Value Date     (H) 06/12/2018    K 3.6 06/12/2018     (H) 06/12/2018    CO2 33 (H) 06/12/2018    BUN 26 (H) 06/12/2018    CREATININE 0.8 06/12/2018    CALCIUM 8.9 06/12/2018    ANIONGAP 9 06/12/2018    ESTGFRAFRICA >60 06/12/2018    EGFRNONAA >60 06/12/2018     Lab Results   Component Value Date    ALBUMIN 2.0 (L) 06/12/2018     No results found for: CRP  Lab Results   Component Value Date    WBC 14.30 (H) 06/12/2018     Pertinent Medications Reviewed: reviewed  Scheduled Meds:   albuterol-ipratropium  3 mL Nebulization Q6H    amLODIPine  10 mg Oral Daily    donepezil  10 mg Oral QHS    enoxaparin  40 mg Subcutaneous Daily    folic acid  1 mg Oral Daily    lamoTRIgine  25 mg Oral BID    lisinopril  40 mg Oral Daily    memantine  5 mg Oral BID    metoprolol  5 mg Intravenous Q6H    pantoprazole  40 mg Intravenous Daily    piperacillin-tazobactam (ZOSYN) IVPB  3.375 g Intravenous Q8H    QUEtiapine  25 mg Oral QHS     Continuous Infusions:   amino acid 2.75% - dextrose 5% (CLINIMIX-E) solution with additives (1L provides 27.5 gm AA, 50 gm CHO (170 kcal/L dextrose), Na 35, K 30, Mg 5, Ca 4.5, Acetate 51, Cl 39, Phos 15) 100 mL/hr at 06/11/18 2138    dextrose 5 % 50 mL/hr at 06/11/18 0543       Physical Findings/Assessment    Overall Physical Appearance:  (2+ edema)  Skin: edema, pressure ulcer(s) (Nain score 12.  Stage 2 pressure ulcer on sacral spine)    Estimated/Assessed Needs    Weight Used For Calorie Calculations: 104.5 kg (230 lb 6.1 oz)     Energy Need Method:  Cedar-St Jeor: 9075 - 7935 (1.0-1.25)     Weight Used For Protein Calculations: 83.6 kg (184 lb 6 oz) (IBW)   1.2-1.5 gm/kg/day: 101-126     Fluid Need Method: RDA Method (or per MD rec)     CHO Requirement: n/a      Nutrition Prescription Ordered    Current Diet Order: NPO  Current Nutrition Support  Formula Ordered: Clinimix E 2.75/5  Current Nutrition Support Rate Ordered: 100 (ml)    Evaluation of Received Nutrient/Fluid Intake    Parenteral Calories (kcal): 672  Parenteral Protein (gm): 66  Parenteral Fluid (mL): 2400  GIR (Glucose Infusion Rate) (mg/kg/min): 0.8 mg/kg/min  Energy Calories Required: not meeting needs  Protein Required: not meeting needs  Fluid Required: meeting needs  % Intake of Estimated Energy Needs: 25 - 50 %  % Meal Intake: NPO    Nutrition Risk    Level of Risk/Frequency of Follow-up:  (2 x wkly)     Assessment and Plan    Malnutrition of moderate degree    Malnutrition in the context of acute on chronic illness    Related to (etiology):  Pulmonary dysfunction and inability to consume sufficient energy     Signs and Symptoms (as evidenced by):  Weight loss: 4.39% x 2 1/2 weeks (5/24/18, 109.3 kg and 6/11/18, 104.5 kg)   Energy Intake: <50% of estimated energy requirement for >5 days   Fluid Accumulation: mild, edema 2+  Wound: Stage 2 sacral spine pressure injury    Interventions/Recommendations (treatment strategy):  Initiate nutrition support    Nutrition Diagnosis Status:  New                 Monitor and Evaluation    Food and Nutrient Intake: energy intake  Food and Nutrient Adminstration: diet order  Anthropometric Measurements: weight, weight change  Biochemical Data, Medical Tests and Procedures: inflammatory profile, electrolyte and renal panel  Nutrition-Focused Physical Findings: overall appearance, skin     Discharge Plan  To be determined

## 2018-06-13 LAB
ALBUMIN SERPL BCP-MCNC: 1.9 G/DL
ALP SERPL-CCNC: 57 U/L
ALT SERPL W/O P-5'-P-CCNC: 38 U/L
ANION GAP SERPL CALC-SCNC: 10 MMOL/L
ANION GAP SERPL CALC-SCNC: 8 MMOL/L
AST SERPL-CCNC: 38 U/L
BASOPHILS # BLD AUTO: 0 K/UL
BASOPHILS NFR BLD: 0.3 %
BILIRUB SERPL-MCNC: 0.6 MG/DL
BUN SERPL-MCNC: 27 MG/DL
BUN SERPL-MCNC: 28 MG/DL
BUN SERPL-MCNC: 29 MG/DL
CALCIUM SERPL-MCNC: 8.9 MG/DL
CALCIUM SERPL-MCNC: 9 MG/DL
CALCIUM SERPL-MCNC: 9.2 MG/DL
CHLORIDE SERPL-SCNC: 109 MMOL/L
CHLORIDE SERPL-SCNC: 110 MMOL/L
CO2 SERPL-SCNC: 29 MMOL/L
CO2 SERPL-SCNC: 30 MMOL/L
CO2 SERPL-SCNC: 31 MMOL/L
CREAT SERPL-MCNC: 0.8 MG/DL
CREAT SERPL-MCNC: 0.9 MG/DL
DIFFERENTIAL METHOD: ABNORMAL
EOSINOPHIL # BLD AUTO: 0.2 K/UL
EOSINOPHIL NFR BLD: 1.3 %
ERYTHROCYTE [DISTWIDTH] IN BLOOD BY AUTOMATED COUNT: 15.8 %
EST. GFR  (AFRICAN AMERICAN): >60 ML/MIN/1.73 M^2
EST. GFR  (NON AFRICAN AMERICAN): >60 ML/MIN/1.73 M^2
GLUCOSE SERPL-MCNC: 165 MG/DL
GLUCOSE SERPL-MCNC: 166 MG/DL
GLUCOSE SERPL-MCNC: 169 MG/DL
GLUCOSE SERPL-MCNC: 172 MG/DL
GLUCOSE SERPL-MCNC: 172 MG/DL
HCT VFR BLD AUTO: 40.1 %
HGB BLD-MCNC: 13.2 G/DL
LYMPHOCYTES # BLD AUTO: 1.2 K/UL
LYMPHOCYTES NFR BLD: 9.4 %
MCH RBC QN AUTO: 30.3 PG
MCHC RBC AUTO-ENTMCNC: 32.9 G/DL
MCV RBC AUTO: 92 FL
MONOCYTES # BLD AUTO: 0.3 K/UL
MONOCYTES NFR BLD: 2.6 %
NEUTROPHILS # BLD AUTO: 11 K/UL
NEUTROPHILS NFR BLD: 86.4 %
PLATELET # BLD AUTO: 227 K/UL
PMV BLD AUTO: 8.9 FL
POCT GLUCOSE: 178 MG/DL (ref 70–110)
POCT GLUCOSE: 185 MG/DL (ref 70–110)
POTASSIUM SERPL-SCNC: 3.5 MMOL/L
POTASSIUM SERPL-SCNC: 3.5 MMOL/L
POTASSIUM SERPL-SCNC: 3.6 MMOL/L
POTASSIUM SERPL-SCNC: 3.7 MMOL/L
POTASSIUM SERPL-SCNC: 3.8 MMOL/L
PROT SERPL-MCNC: 6 G/DL
RBC # BLD AUTO: 4.36 M/UL
SODIUM SERPL-SCNC: 148 MMOL/L
SODIUM SERPL-SCNC: 149 MMOL/L
SODIUM SERPL-SCNC: 149 MMOL/L
SODIUM SERPL-SCNC: 150 MMOL/L
SODIUM SERPL-SCNC: 150 MMOL/L
WBC # BLD AUTO: 12.7 K/UL

## 2018-06-13 PROCEDURE — 94761 N-INVAS EAR/PLS OXIMETRY MLT: CPT

## 2018-06-13 PROCEDURE — C9113 INJ PANTOPRAZOLE SODIUM, VIA: HCPCS | Performed by: INTERNAL MEDICINE

## 2018-06-13 PROCEDURE — 80053 COMPREHEN METABOLIC PANEL: CPT

## 2018-06-13 PROCEDURE — 80048 BASIC METABOLIC PNL TOTAL CA: CPT | Mod: 91

## 2018-06-13 PROCEDURE — 27000221 HC OXYGEN, UP TO 24 HOURS

## 2018-06-13 PROCEDURE — 94640 AIRWAY INHALATION TREATMENT: CPT

## 2018-06-13 PROCEDURE — 92610 EVALUATE SWALLOWING FUNCTION: CPT

## 2018-06-13 PROCEDURE — 36415 COLL VENOUS BLD VENIPUNCTURE: CPT

## 2018-06-13 PROCEDURE — 20000000 HC ICU ROOM

## 2018-06-13 PROCEDURE — 94660 CPAP INITIATION&MGMT: CPT

## 2018-06-13 PROCEDURE — 25000003 PHARM REV CODE 250: Performed by: INTERNAL MEDICINE

## 2018-06-13 PROCEDURE — 85025 COMPLETE CBC W/AUTO DIFF WBC: CPT

## 2018-06-13 PROCEDURE — 25000242 PHARM REV CODE 250 ALT 637 W/ HCPCS: Performed by: INTERNAL MEDICINE

## 2018-06-13 PROCEDURE — 99900035 HC TECH TIME PER 15 MIN (STAT)

## 2018-06-13 PROCEDURE — 99233 SBSQ HOSP IP/OBS HIGH 50: CPT | Mod: ,,, | Performed by: INTERNAL MEDICINE

## 2018-06-13 PROCEDURE — 63600175 PHARM REV CODE 636 W HCPCS: Performed by: INTERNAL MEDICINE

## 2018-06-13 PROCEDURE — 25000003 PHARM REV CODE 250: Performed by: NURSE PRACTITIONER

## 2018-06-13 PROCEDURE — 99232 SBSQ HOSP IP/OBS MODERATE 35: CPT | Mod: ,,, | Performed by: INTERNAL MEDICINE

## 2018-06-13 RX ADMIN — LISINOPRIL 40 MG: 40 TABLET ORAL at 09:06

## 2018-06-13 RX ADMIN — PIPERACILLIN AND TAZOBACTAM 3.38 G: 3; .375 INJECTION, POWDER, LYOPHILIZED, FOR SOLUTION INTRAVENOUS; PARENTERAL at 06:06

## 2018-06-13 RX ADMIN — FOLIC ACID 1 MG: 1 TABLET ORAL at 09:06

## 2018-06-13 RX ADMIN — METOPROLOL TARTRATE 5 MG: 5 INJECTION, SOLUTION INTRAVENOUS at 06:06

## 2018-06-13 RX ADMIN — ENOXAPARIN SODIUM 40 MG: 100 INJECTION SUBCUTANEOUS at 05:06

## 2018-06-13 RX ADMIN — METOPROLOL TARTRATE 5 MG: 5 INJECTION, SOLUTION INTRAVENOUS at 11:06

## 2018-06-13 RX ADMIN — IPRATROPIUM BROMIDE AND ALBUTEROL SULFATE 3 ML: .5; 3 SOLUTION RESPIRATORY (INHALATION) at 01:06

## 2018-06-13 RX ADMIN — AMLODIPINE BESYLATE 10 MG: 5 TABLET ORAL at 09:06

## 2018-06-13 RX ADMIN — DONEPEZIL HYDROCHLORIDE 10 MG: 5 TABLET, FILM COATED ORAL at 10:06

## 2018-06-13 RX ADMIN — MEMANTINE HYDROCHLORIDE 5 MG: 5 TABLET ORAL at 10:06

## 2018-06-13 RX ADMIN — IPRATROPIUM BROMIDE AND ALBUTEROL SULFATE 3 ML: .5; 3 SOLUTION RESPIRATORY (INHALATION) at 07:06

## 2018-06-13 RX ADMIN — PIPERACILLIN AND TAZOBACTAM 3.38 G: 3; .375 INJECTION, POWDER, LYOPHILIZED, FOR SOLUTION INTRAVENOUS; PARENTERAL at 12:06

## 2018-06-13 RX ADMIN — HALOPERIDOL LACTATE 2.5 MG: 5 INJECTION, SOLUTION INTRAMUSCULAR at 09:06

## 2018-06-13 RX ADMIN — METOPROLOL TARTRATE 5 MG: 5 INJECTION, SOLUTION INTRAVENOUS at 05:06

## 2018-06-13 RX ADMIN — QUETIAPINE FUMARATE 25 MG: 25 TABLET ORAL at 10:06

## 2018-06-13 RX ADMIN — IPRATROPIUM BROMIDE AND ALBUTEROL SULFATE 3 ML: .5; 3 SOLUTION RESPIRATORY (INHALATION) at 12:06

## 2018-06-13 RX ADMIN — PIPERACILLIN AND TAZOBACTAM 3.38 G: 3; .375 INJECTION, POWDER, LYOPHILIZED, FOR SOLUTION INTRAVENOUS; PARENTERAL at 09:06

## 2018-06-13 RX ADMIN — TRAMADOL HYDROCHLORIDE 50 MG: 50 TABLET, FILM COATED ORAL at 12:06

## 2018-06-13 RX ADMIN — PANTOPRAZOLE SODIUM 40 MG: 40 INJECTION, POWDER, LYOPHILIZED, FOR SOLUTION INTRAVENOUS at 09:06

## 2018-06-13 RX ADMIN — LAMOTRIGINE 25 MG: 25 TABLET ORAL at 09:06

## 2018-06-13 RX ADMIN — LAMOTRIGINE 25 MG: 25 TABLET ORAL at 10:06

## 2018-06-13 RX ADMIN — MEMANTINE HYDROCHLORIDE 5 MG: 5 TABLET ORAL at 09:06

## 2018-06-13 NOTE — PLAN OF CARE
Problem: Patient Care Overview  Goal: Plan of Care Review  Outcome: Ongoing (interventions implemented as appropriate)  Patient free of falls and injuries this shift. On 5 liter nasal cannula till about 2200 and placed on bipap. Remained on bipap all night and tolerated well. Slept well most of the night but has remained confused. Bilateral wrist restraints on with a good distal pulse and circulation noted. Placed on 4 liter nasal cannula this am per respiratory staff.

## 2018-06-13 NOTE — PROGRESS NOTES
Progress Note  Hospital Medicine  Patient Name:Angel Pineda  MRN:  [id number]  Patient Class: IP- Inpatient  Admit Date: 6/7/2018  Length of Stay: 6 days  Expected Discharge Date:   Attending Physician: Aston Washington MD  Primary Care Provider:  Primary Doctor No    SUBJECTIVE:     Principal Problem: Acute respiratory failure with hypoxia and hypercapnia  Initial history of present illness: Patient is a 73 y.o. male admitted to Hospitalist Service from [facility] with complaint of SOB. Patient reportedly has past medical history significant for hypertension and dementia. Patient is a resident of [facility]. Further detail of HPI are not available. Patient's son is at bedside who is POA. He reports, patient had some diarrhea for 1-2 days but no fever. He was started on some antibiotics 2 days ago.     PMH/PSH/SH/FH/Meds: reviewed.    Symptoms/Review of Systems: In ICU, used BiPAP last night, now on 4L/min oxygen via NC. NGT placed and receiving free water supplementation, Hypernatremia is slightly better.  Diet:  NGT feeding  Activity level: Up with assistance  Pain:  NAD    OBJECTIVE:   Vital Signs (Most Recent):      Temp: 98.5 °F (36.9 °C) (06/13/18 0730)  Pulse: 82 (06/13/18 0738)  Resp: (!) 24 (06/13/18 0738)  BP: (!) 159/74 (06/13/18 0730)  SpO2: 97 % (06/13/18 0738)       Vital Signs Range (Last 24H):  Temp:  [98.5 °F (36.9 °C)-99.5 °F (37.5 °C)]   Pulse:  []   Resp:  [12-70]   BP: (127-193)/()   SpO2:  [85 %-100 %]     Weight: 105.1 kg (231 lb 11.3 oz)  Body mass index is 30.57 kg/m².    Intake/Output Summary (Last 24 hours) at 06/13/18 0806  Last data filed at 06/13/18 0600   Gross per 24 hour   Intake             4120 ml   Output             2250 ml   Net             1870 ml     Physical Examination:  General appearance: well developed, appears stated age,  Head: normocephalic, atraumatic  Eyes:  conjunctivae/corneas clear. PERRL.  Nose: Nares normal. Septum  midline.  Throat: lips, mucosa, and tongue normal; teeth and gums normal, no throat erythema.  Neck: supple, symmetrical, trachea midline, no JVD and thyroid not enlarged, symmetric, no tenderness/mass/nodules  Lungs:  clear to auscultation bilaterally and normal respiratory effort  Chest wall: no tenderness  Heart: regular rate and rhythm, S1, S2 normal, no murmur, click, rub or gallop  Abdomen: soft, non-tender non-distented; bowel sounds normal; no masses,  no organomegaly  Extremities: no cyanosis, clubbing or edema.   Pulses: 2+ and symmetric  Skin: Skin color, texture, turgor normal. No rashes or lesions.  Lymph nodes: Cervical, supraclavicular, and axillary nodes normal.  Neurologic: Advanced dementia, lethargic and unable to respond verbal commands    CBC:    Recent Labs  Lab 06/11/18  0319 06/12/18  0323 06/13/18  0310   WBC 15.70* 14.30* 12.70   RBC 4.34* 4.33* 4.36*   HGB 13.2* 13.1* 13.2*   HCT 40.2 40.3 40.1    240 227   MCV 93 93 92   MCH 30.4 30.4 30.3   MCHC 32.8 32.6 32.9   BMP    Recent Labs  Lab 06/10/18  0349  06/12/18  1138 06/12/18  1804 06/12/18  2355 06/13/18  0310   *  < > 157* 155* 169* 172*  172*   *  < > 153* 152* 149* 150*  150*   K 4.4  < > 3.6 3.7 3.7 3.5  3.5   *  < > 111* 110 110 109  109   CO2 29  < > 33* 31* 29 31*  31*   BUN 58*  < > 26* 26* 27* 27*  27*   CREATININE 1.1  < > 0.8 0.9 0.8 0.9  0.9   CALCIUM 9.0  < > 8.9 9.2 8.9 8.9  8.9   MG 2.6  --  1.6  --   --   --    < > = values in this interval not displayed.   Diagnostic Results:  Microbiology Results (last 7 days)     Procedure Component Value Units Date/Time    Blood culture [[id number]] Collected:  06/10/18 1523    Order Status:  Completed Specimen:  Blood from Antecubital, Left  Arm Updated:  06/13/18 0612     Blood Culture, Routine No Growth to date     Blood Culture, Routine No Growth to date     Blood Culture, Routine No Growth to date    Blood culture [[id number]] Collected:  06/10/18  1523    Order Status:  Completed Specimen:  Blood from Antecubital, Right Updated:  06/13/18 0612     Blood Culture, Routine No Growth to date     Blood Culture, Routine No Growth to date     Blood Culture, Routine No Growth to date    Culture, Respiratory with Gram Stain [[id number]]  (Susceptibility) Collected:  06/09/18 0453    Order Status:  Completed Specimen:  Respiratory from Tracheal Aspirate Updated:  06/12/18 0954     Respiratory Culture --     ESCHERICHIA COLI  Few       Respiratory Culture --     CANDIDA TROPICALIS  Moderate       Gram Stain (Respiratory) <10 epithelial cells per low power field.     Gram Stain (Respiratory) Rare WBC's     Gram Stain (Respiratory) No organisms seen    Blood culture [[id number]]  (Susceptibility) Collected:  06/07/18 1501    Order Status:  Completed Specimen:  Blood Updated:  06/10/18 0755     Blood Culture, Routine Gram stain peds bottle: Gram positive cocci in chains resembling Strep       Blood Culture, Routine Positive results previously called 06/08/2018  18:18     Blood Culture, Routine STREPTOCOCCUS PNEUMONIAE    Narrative:       Aerobic and anaerobic    Blood culture [[id number]] Collected:  06/07/18 1502    Order Status:  Completed Specimen:  Blood Updated:  06/10/18 0751     Blood Culture, Routine Gram stain peds bottle: Gram positive cocci in chains resembling Strep     Blood Culture, Routine Results called to and read back by: Juan Canales RN 06/08/2018  14:59     Blood Culture, Routine --     STREPTOCOCCUS PNEUMONIAE  For susceptibility see order # [id number]      Narrative:       Aerobic and anaerobic    Culture, Respiratory with Gram Stain [[id number]]     Order Status:  Canceled Specimen:  Respiratory          Chest X-Ray: Right lung predominant interstitial changes may represent asymmetric pulmonary edema or an atypical infectious process.    ECHO:  · The left ventricle cavity is normal.  · Left ventricle ejection fraction is at 55%  · Normal LV diastolic  function.  · RV systolic function is normal.  · Left atrium is mildly dilated.  · RA cavity size is normal.  · Mitral vavlve is normal.  · Aortic valve is normal.  · Pericardium is normal.    Assessment/Plan:      *Acute hypoxemic and hypercarbic respiratory failure [J96.01]   Yes    Pneumonia of right lower lobe due to E. Coli sp [J18.1]  Strep. Pneumoniae bacteremia  Supplemental O2 via nasal canula; titrate O2 saturation to >92%. Use BiPAP now. Follow Dr. Douglas's recommendations.  Continue beta 2 agonist bronchodilator treatments.   Continue IV antibiotics - Zosyn. Pharmacist to dose Vancomycin.  Continue routine medications as before.       Yes    Malnutrition of moderate degree [E44.0]  Encourage maximal PO intake. Diet supplementation ordered per nutrition approval. Will encourage PO and monitor closely for weight changes.      Yes    ANÍBAL (acute kidney injury) [N17.9] - resolved  Follow BMP.    Swallow dysfunction  Patient is receiving NGT feeding. Patient has failed swallow testing x 3. I discussed with patient's son at length and different optioned reviewed including PEG placement if desired. He wants to think about it and will let us know his wishes.      Yes    Hypernatremia  Follow BMP.  St to evaluate for resumption of diet Vs NGT feeding with increased free-water.       Yes    Hypertension, uncontrolled [I10]  Tele-monitoring.  Chronic medications and monitor for any changes, adjusting as needed.  Use Hydralazine 10 mg IV q 2 hrs prn SBP > 160 mmHg.     Dementia  Dementia is controlled currently. Continue home dementia meds and non-pharmacologic interventions to prevent delirium (No VS between 11PM-5AM, activity during day, opening blinds, providing glasses/hearing aids, and up in chair during daytime). Use PRN anti-psychotics to prevent behavior of self harm during sundowning, and avoid narcotics and benzos unless absolutely necessary. PRN anti-psychotics prescribed to avoid self harm behaviors.  On Aricept and Namenda.   Yes       DVT prophylaxis: Lovenox 40 mg SQ q day.    Code Status: Full code  Prognosis; poor    VTE Risk Mitigation         Ordered     enoxaparin injection 40 mg  Daily      06/07/18 1720     IP VTE HIGH RISK PATIENT  Once      06/07/18 1720     Place AME hose  Until discontinued      06/07/18 1720     Place sequential compression device  Until discontinued      06/07/18 1720        Aston Washington MD  Department of [facility]   [facility]    Addendum 12:00 noon.  I had a family meeting with patient's son and daughter in law. I updateed them regarding patient's overall condition not improving. They are agreed, patientis having a poor quality of life and likely is not going to do well. We discussed hospice care and they will be discussing amonst family and will let us know their wishes.We also discussed, if patient does bot swallow well, he may need PEG. Time spent in care of the patient, counseling and coordination of care (Greater than 50% spent in direct face to face contact): 35 min.

## 2018-06-13 NOTE — PLAN OF CARE
Problem: Patient Care Overview  Goal: Plan of Care Review  Outcome: Ongoing (interventions implemented as appropriate)  Pt on 4L NC with bipap in use as needed, and Q6 duoneb treatments

## 2018-06-13 NOTE — PLAN OF CARE
"Problem: Patient Care Overview  Goal: Plan of Care Review  Outcome: Ongoing (interventions implemented as appropriate)  Pt remains in ICU, on 4 L nasal cannula, O2 sats 94-96%. Failed 3rd bedside swallow study today, son wants to think more about PEG placement. Tolerating tube feeds at 50 ml/hr with 250 ml water flushes q4h. Bilateral wrist restraints remain in place. Pulls off gown and leads when restraints become loose. Pt repeats "I feel bad" occasionally or just moans. Does not follows commands. Afebrile this shift. Safety maintained.       "

## 2018-06-13 NOTE — PLAN OF CARE
Dr Washington spoke with the pt's son, Angel regarding the pt failing ST eval x3.   He is not ready to make a decision for a PEG tube at this time. He will call us back when he has made a decision....Inessa Colin       06/13/18 5773   Discharge Reassessment   Assessment Type Discharge Planning Reassessment

## 2018-06-13 NOTE — PLAN OF CARE
Problem: SLP Goal  Goal: SLP Goal  1. Participate in ongoing assessment of swallow function to determine least restrictive means of nutrition/hydration   Outcome: Ongoing (interventions implemented as appropriate)  1. Congested breathing, unable to cough.  RT suctioned, but he remained with coarse breathing & occasional, spontaneous, congested cough.  Consistent cough after ice chips x3.

## 2018-06-13 NOTE — PLAN OF CARE
06/12/18 1929   Patient Assessment/Suction   Level of Consciousness (AVPU) alert   Respiratory Effort Slight;Labored   Expansion/Accessory Muscles/Retractions accessory muscle use   All Lung Fields Breath Sounds coarse;diminished   $ Suction Charges NT Suction Procedure   Sputum Amount moderate   Sputum Color tan   Sputum Consistency thick   Aspirate Toleration SHU (no adverse reactions)   PRE-TX-O2-ETCO2   O2 Device (Oxygen Therapy) High Flow nasal Cannula   Flow (L/min) 6   SpO2 99 %   Pulse 90   Resp (!) 28   Aerosol Therapy   $ Aerosol Therapy Charges Aerosol Treatment   Respiratory Treatment Status given   SVN/Inhaler Treatment Route mask;with oxygen   Position During Treatment HOB at 45 degrees   Patient Tolerance good   Post-Treatment   Post-treatment Heart Rate (beats/min) 90   Post-treatment Resp Rate (breaths/min) 26   All Fields Breath Sounds unchanged   Preset CPAP/BiPAP Settings   Mode Of Delivery Standby   Pt tolerated NC and treatments well.

## 2018-06-13 NOTE — PLAN OF CARE
Problem: Patient Care Overview  Goal: Plan of Care Review  Outcome: Ongoing (interventions implemented as appropriate)  Maintaining O2 sats in the 90's on 6 L HFNC.  Good cough. Swallows secretions. Failed swallow study. NGT intact right nare. Tube feedings started at 10cc hour with a goal of 50cc hour and water flushes per dietary recommendations.  Mack intact, good urine output. Large loose BM today. Perineal area red. Cream applied. Low grade temp. Rotation bed in use. Soft wrist restraints on. Safety maintained.

## 2018-06-13 NOTE — PROGRESS NOTES
Progress Note  PULMONARY    Admit Date: 6/7/2018 06/13/2018      SUBJECTIVE:     June 9, perseverates , on niv,    Zee 10, as above still, no c/o save not feeling good  June 11, same,   June 12, much calmer but still very confused.  June 13, calmer confused state    PFSH and Allergies reviewed.    OBJECTIVE:     Vitals (Most recent):  Vitals:    06/13/18 0738   BP:    Pulse: 82   Resp: (!) 24   Temp:        Vitals (24 hour range):  Temp:  [98.5 °F (36.9 °C)-99.5 °F (37.5 °C)]   Pulse:  []   Resp:  [12-70]   BP: (127-193)/()   SpO2:  [85 %-100 %]       Intake/Output Summary (Last 24 hours) at 06/13/18 0820  Last data filed at 06/13/18 0600   Gross per 24 hour   Intake             4120 ml   Output             2250 ml   Net             1870 ml          Physical Exam:  The patient's neuro status (alertness,orientation,cognitive function,motor skills,), pharyngeal exam (oral lesions, hygiene, abn dentition,), Neck (jvd,mass,thyroid,nodes in neck and above/below clavicle),RESPIRATORY(symmetry,effort,fremitus,percussion,auscultation),  Cor(rhythm,heart tones including gallops,perfusion,edema)ABD(distention,hepatic&splenomegaly,tenderness,masses), Skin(rash,cyanosis),Psyc(affect,judgement,).  Exam negative except for these pertinent findings:    Alert but   confused, on nc, chest is symmetric, no distress, normal percussion, normal fremitus and good normal breath sounds  No edema, cor rrr, no murmur     Radiographs reviewed: view by direct vision  - impressive right lung infiltrate c/w aspiration pneumonia- no new 6/10  Results for orders placed during the hospital encounter of 06/07/18   X-Ray Chest 1 View    Narrative EXAMINATION:  XR CHEST 1 VIEW    CLINICAL HISTORY:  low o2 sat;    TECHNIQUE:  Single frontal view of the chest was performed.    COMPARISON:  Chest of June 7, 2018.    FINDINGS:  The cardiac size is within normal limits.  Calcification is noted in the aorta.  There is increased density in the  right lower lobe infiltrate consistent with pneumonia and possible small pleural effusion.  The left lung is relatively clear.  No pneumothorax is seen.      Impression Increasing density of right lower lobe infiltrate and probable pleural effusion.  Atherosclerosis.      Electronically signed by: Juan Collier MD  Date:    06/08/2018  Time:    10:51   ]    Labs       Recent Labs  Lab 06/13/18  0310   WBC 12.70   HGB 13.2*   HCT 40.1          Recent Labs  Lab 06/12/18  1138  06/13/18  0310   *  < > 150*  150*   K 3.6  < > 3.5  3.5   *  < > 109  109   CO2 33*  < > 31*  31*   BUN 26*  < > 27*  27*   CREATININE 0.8  < > 0.9  0.9   *  < > 172*  172*   CALCIUM 8.9  < > 8.9  8.9   MG 1.6  --   --    PHOS 4.7*  --   --    AST  --   --  38   ALT  --   --  38   ALKPHOS  --   --  57   BILITOT  --   --  0.6   PROT  --   --  6.0   ALBUMIN  --   --  1.9*   < > = values in this interval not displayed.  No results for input(s): PH, PCO2, PO2, HCO3 in the last 24 hours.  Microbiology Results (last 7 days)     Procedure Component Value Units Date/Time    Blood culture [[id number]] Collected:  06/10/18 1523    Order Status:  Completed Specimen:  Blood from Antecubital, Left  Arm Updated:  06/13/18 0612     Blood Culture, Routine No Growth to date     Blood Culture, Routine No Growth to date     Blood Culture, Routine No Growth to date    Blood culture [[id number]] Collected:  06/10/18 1523    Order Status:  Completed Specimen:  Blood from Antecubital, Right Updated:  06/13/18 0612     Blood Culture, Routine No Growth to date     Blood Culture, Routine No Growth to date     Blood Culture, Routine No Growth to date    Culture, Respiratory with Gram Stain [[id number]]  (Susceptibility) Collected:  06/09/18 5781    Order Status:  Completed Specimen:  Respiratory from Tracheal Aspirate Updated:  06/12/18 0954     Respiratory Culture --     ESCHERICHIA COLI  Few       Respiratory Culture --     CANDIDA  TROPICALIS  Moderate       Gram Stain (Respiratory) <10 epithelial cells per low power field.     Gram Stain (Respiratory) Rare WBC's     Gram Stain (Respiratory) No organisms seen    Blood culture [[id number]]  (Susceptibility) Collected:  06/07/18 1501    Order Status:  Completed Specimen:  Blood Updated:  06/10/18 0755     Blood Culture, Routine Gram stain peds bottle: Gram positive cocci in chains resembling Strep       Blood Culture, Routine Positive results previously called 06/08/2018  18:18     Blood Culture, Routine STREPTOCOCCUS PNEUMONIAE    Narrative:       Aerobic and anaerobic    Blood culture [[id number]] Collected:  06/07/18 1502    Order Status:  Completed Specimen:  Blood Updated:  06/10/18 0751     Blood Culture, Routine Gram stain peds bottle: Gram positive cocci in chains resembling Strep     Blood Culture, Routine Results called to and read back by: Juan Canales RN 06/08/2018  14:59     Blood Culture, Routine --     STREPTOCOCCUS PNEUMONIAE  For susceptibility see order # [id number]      Narrative:       Aerobic and anaerobic    Culture, Respiratory with Gram Stain [[id number]]     Order Status:  Canceled Specimen:  Respiratory           Impression:  Active Hospital Problems    Diagnosis  POA    *Acute respiratory failure with hypoxia and hypercapnia [J96.01, J96.02]  Yes    Dementia with behavioral disturbance [F03.91]  Yes    Hypernatremia [E87.0]  No    Bacteremia [R78.81]  Yes    Pneumococcal bacteremia [R78.81]  Yes    COPD with acute lower respiratory infection [J44.0]  Yes    Pneumonia of right lower lobe due to infectious organism [J18.1]  Yes    Malnutrition of moderate degree [E44.0]  Yes    ANÍBAL (acute kidney injury) [N17.9]  Yes    Hypertension [I10]  Yes      Resolved Hospital Problems    Diagnosis Date Resolved POA    Hyponatremia [E87.1] 06/10/2018 Yes     Plan:   June 9, expect aspiration, still too unstable to get off bipap.  [place] remains poor.  Cont abx and  observe.    Zee 10 , very alert , confused, on bipap still.  Stop vanc with gnr in sputum, and strep pneumo in blood.  [place] remains poor.      June 11, resp seems slowly better but bed bound and very demented.  Delirium issue.  May improve but doubt will do well.  June 12, ecoli in sputum and pneumococcus in blood.  No new recs  June 13, will f/u cxr am, peg may be reasonable?? Hospice seems reasonable if family directs.                        .

## 2018-06-14 LAB
ALBUMIN SERPL BCP-MCNC: 1.9 G/DL
ALP SERPL-CCNC: 66 U/L
ALT SERPL W/O P-5'-P-CCNC: 50 U/L
ANION GAP SERPL CALC-SCNC: 8 MMOL/L
AST SERPL-CCNC: 50 U/L
BASOPHILS # BLD AUTO: 0 K/UL
BASOPHILS NFR BLD: 0.2 %
BILIRUB SERPL-MCNC: 0.5 MG/DL
BUN SERPL-MCNC: 28 MG/DL
BUN SERPL-MCNC: 29 MG/DL
BUN SERPL-MCNC: 30 MG/DL
CALCIUM SERPL-MCNC: 8.8 MG/DL
CALCIUM SERPL-MCNC: 9 MG/DL
CALCIUM SERPL-MCNC: 9 MG/DL
CALCIUM SERPL-MCNC: 9.1 MG/DL
CALCIUM SERPL-MCNC: 9.1 MG/DL
CHLORIDE SERPL-SCNC: 108 MMOL/L
CHLORIDE SERPL-SCNC: 109 MMOL/L
CHLORIDE SERPL-SCNC: 109 MMOL/L
CHLORIDE SERPL-SCNC: 111 MMOL/L
CHLORIDE SERPL-SCNC: 111 MMOL/L
CO2 SERPL-SCNC: 31 MMOL/L
CO2 SERPL-SCNC: 32 MMOL/L
CREAT SERPL-MCNC: 0.9 MG/DL
CREAT SERPL-MCNC: 1 MG/DL
DIFFERENTIAL METHOD: ABNORMAL
EOSINOPHIL # BLD AUTO: 0.2 K/UL
EOSINOPHIL NFR BLD: 1.7 %
ERYTHROCYTE [DISTWIDTH] IN BLOOD BY AUTOMATED COUNT: 15.7 %
EST. GFR  (AFRICAN AMERICAN): >60 ML/MIN/1.73 M^2
EST. GFR  (NON AFRICAN AMERICAN): >60 ML/MIN/1.73 M^2
GLUCOSE SERPL-MCNC: 149 MG/DL
GLUCOSE SERPL-MCNC: 170 MG/DL
GLUCOSE SERPL-MCNC: 176 MG/DL
GLUCOSE SERPL-MCNC: 193 MG/DL
GLUCOSE SERPL-MCNC: 193 MG/DL
HCT VFR BLD AUTO: 38.1 %
HGB BLD-MCNC: 12.6 G/DL
LYMPHOCYTES # BLD AUTO: 1 K/UL
LYMPHOCYTES NFR BLD: 8.3 %
MCH RBC QN AUTO: 30.4 PG
MCHC RBC AUTO-ENTMCNC: 33.1 G/DL
MCV RBC AUTO: 92 FL
MONOCYTES # BLD AUTO: 0.5 K/UL
MONOCYTES NFR BLD: 4.3 %
NEUTROPHILS # BLD AUTO: 9.9 K/UL
NEUTROPHILS NFR BLD: 85.5 %
PLATELET # BLD AUTO: 235 K/UL
PMV BLD AUTO: 8.9 FL
POCT GLUCOSE: 189 MG/DL (ref 70–110)
POTASSIUM SERPL-SCNC: 3.5 MMOL/L
POTASSIUM SERPL-SCNC: 3.6 MMOL/L
POTASSIUM SERPL-SCNC: 3.8 MMOL/L
PROT SERPL-MCNC: 6 G/DL
RBC # BLD AUTO: 4.15 M/UL
SODIUM SERPL-SCNC: 148 MMOL/L
SODIUM SERPL-SCNC: 149 MMOL/L
SODIUM SERPL-SCNC: 149 MMOL/L
SODIUM SERPL-SCNC: 150 MMOL/L
SODIUM SERPL-SCNC: 151 MMOL/L
WBC # BLD AUTO: 11.6 K/UL

## 2018-06-14 PROCEDURE — 94640 AIRWAY INHALATION TREATMENT: CPT

## 2018-06-14 PROCEDURE — 25000003 PHARM REV CODE 250: Performed by: INTERNAL MEDICINE

## 2018-06-14 PROCEDURE — 99231 SBSQ HOSP IP/OBS SF/LOW 25: CPT | Mod: ,,, | Performed by: INTERNAL MEDICINE

## 2018-06-14 PROCEDURE — 25000003 PHARM REV CODE 250: Performed by: NURSE PRACTITIONER

## 2018-06-14 PROCEDURE — 80048 BASIC METABOLIC PNL TOTAL CA: CPT | Mod: 91

## 2018-06-14 PROCEDURE — C9113 INJ PANTOPRAZOLE SODIUM, VIA: HCPCS | Performed by: INTERNAL MEDICINE

## 2018-06-14 PROCEDURE — 25000003 PHARM REV CODE 250: Performed by: EMERGENCY MEDICINE

## 2018-06-14 PROCEDURE — 80053 COMPREHEN METABOLIC PANEL: CPT

## 2018-06-14 PROCEDURE — 99900035 HC TECH TIME PER 15 MIN (STAT)

## 2018-06-14 PROCEDURE — 25000242 PHARM REV CODE 250 ALT 637 W/ HCPCS: Performed by: INTERNAL MEDICINE

## 2018-06-14 PROCEDURE — 63600175 PHARM REV CODE 636 W HCPCS: Performed by: INTERNAL MEDICINE

## 2018-06-14 PROCEDURE — 94660 CPAP INITIATION&MGMT: CPT

## 2018-06-14 PROCEDURE — 20000000 HC ICU ROOM

## 2018-06-14 PROCEDURE — 27000221 HC OXYGEN, UP TO 24 HOURS

## 2018-06-14 PROCEDURE — 36415 COLL VENOUS BLD VENIPUNCTURE: CPT

## 2018-06-14 PROCEDURE — 85025 COMPLETE CBC W/AUTO DIFF WBC: CPT

## 2018-06-14 PROCEDURE — 31720 CLEARANCE OF AIRWAYS: CPT

## 2018-06-14 PROCEDURE — 99233 SBSQ HOSP IP/OBS HIGH 50: CPT | Mod: ,,, | Performed by: INTERNAL MEDICINE

## 2018-06-14 PROCEDURE — 94761 N-INVAS EAR/PLS OXIMETRY MLT: CPT

## 2018-06-14 RX ORDER — NYSTATIN 100000 [USP'U]/ML
500000 SUSPENSION ORAL 4 TIMES DAILY
Status: DISCONTINUED | OUTPATIENT
Start: 2018-06-14 | End: 2018-06-19 | Stop reason: HOSPADM

## 2018-06-14 RX ORDER — DIPHENOXYLATE HYDROCHLORIDE AND ATROPINE SULFATE 2.5; .025 MG/1; MG/1
1 TABLET ORAL 4 TIMES DAILY PRN
Status: DISCONTINUED | OUTPATIENT
Start: 2018-06-14 | End: 2018-06-19 | Stop reason: HOSPADM

## 2018-06-14 RX ADMIN — IPRATROPIUM BROMIDE AND ALBUTEROL SULFATE 3 ML: .5; 3 SOLUTION RESPIRATORY (INHALATION) at 12:06

## 2018-06-14 RX ADMIN — MEMANTINE HYDROCHLORIDE 5 MG: 5 TABLET ORAL at 08:06

## 2018-06-14 RX ADMIN — QUETIAPINE FUMARATE 25 MG: 25 TABLET ORAL at 08:06

## 2018-06-14 RX ADMIN — PIPERACILLIN AND TAZOBACTAM 3.38 G: 3; .375 INJECTION, POWDER, LYOPHILIZED, FOR SOLUTION INTRAVENOUS; PARENTERAL at 04:06

## 2018-06-14 RX ADMIN — AMLODIPINE BESYLATE 10 MG: 5 TABLET ORAL at 08:06

## 2018-06-14 RX ADMIN — METOPROLOL TARTRATE 5 MG: 5 INJECTION, SOLUTION INTRAVENOUS at 05:06

## 2018-06-14 RX ADMIN — PANTOPRAZOLE SODIUM 40 MG: 40 INJECTION, POWDER, LYOPHILIZED, FOR SOLUTION INTRAVENOUS at 08:06

## 2018-06-14 RX ADMIN — METOPROLOL TARTRATE 5 MG: 5 INJECTION, SOLUTION INTRAVENOUS at 12:06

## 2018-06-14 RX ADMIN — NYSTATIN 500000 UNITS: 100000 SUSPENSION ORAL at 08:06

## 2018-06-14 RX ADMIN — LISINOPRIL 40 MG: 40 TABLET ORAL at 08:06

## 2018-06-14 RX ADMIN — PIPERACILLIN AND TAZOBACTAM 3.38 G: 3; .375 INJECTION, POWDER, LYOPHILIZED, FOR SOLUTION INTRAVENOUS; PARENTERAL at 09:06

## 2018-06-14 RX ADMIN — NYSTATIN 500000 UNITS: 100000 SUSPENSION ORAL at 04:06

## 2018-06-14 RX ADMIN — HALOPERIDOL LACTATE 2.5 MG: 5 INJECTION, SOLUTION INTRAMUSCULAR at 02:06

## 2018-06-14 RX ADMIN — TRAMADOL HYDROCHLORIDE 50 MG: 50 TABLET, FILM COATED ORAL at 02:06

## 2018-06-14 RX ADMIN — DONEPEZIL HYDROCHLORIDE 10 MG: 5 TABLET, FILM COATED ORAL at 08:06

## 2018-06-14 RX ADMIN — ACETAMINOPHEN 650 MG: 325 TABLET, FILM COATED ORAL at 01:06

## 2018-06-14 RX ADMIN — ENOXAPARIN SODIUM 40 MG: 100 INJECTION SUBCUTANEOUS at 04:06

## 2018-06-14 RX ADMIN — IPRATROPIUM BROMIDE AND ALBUTEROL SULFATE 3 ML: .5; 3 SOLUTION RESPIRATORY (INHALATION) at 07:06

## 2018-06-14 RX ADMIN — PIPERACILLIN AND TAZOBACTAM 3.38 G: 3; .375 INJECTION, POWDER, LYOPHILIZED, FOR SOLUTION INTRAVENOUS; PARENTERAL at 12:06

## 2018-06-14 RX ADMIN — LAMOTRIGINE 25 MG: 25 TABLET ORAL at 08:06

## 2018-06-14 RX ADMIN — FOLIC ACID 1 MG: 1 TABLET ORAL at 08:06

## 2018-06-14 NOTE — PLAN OF CARE
Problem: Patient Care Overview  Goal: Plan of Care Review  Outcome: Ongoing (interventions implemented as appropriate)  POC reviewed, however pt confused and nonverbal throughout shift. Pt with BMx2 during shift.Mack intact and draining adequate yellow urine. TF running at goal rate of 50ml/hr.Denies pain. Free of falls/injury. Meds given per NG tube. Will monitor.

## 2018-06-14 NOTE — PLAN OF CARE
"Dr Washington again spoke with pt's son, Angel regarding the plan of care. He is "leaning toward" hospice but wants to speak with the hospice company and assisted living facility.   We will again follow up with him regarding his decision....Inessa Colin       06/14/18 7214   Discharge Reassessment   Assessment Type Discharge Planning Reassessment     "

## 2018-06-14 NOTE — PLAN OF CARE
Problem: Patient Care Overview  Goal: Plan of Care Review  Outcome: Ongoing (interventions implemented as appropriate)  Pt remains in ICU. Son still deciding if going to place PEG or go with hospice. Pt pulled out NG tube today and may have aspirated tube feeds that were infusing. Ng tube replaced, tolerating tube feeds at goal rate. Bilat wrist restaints remain in place. Having 2 loose bowel movements per shift. Will test for c-diff for next BM. Safety maintained.

## 2018-06-14 NOTE — PROGRESS NOTES
06/14/18 0718   Patient Assessment/Suction   Level of Consciousness (AVPU) alert   All Lung Fields Breath Sounds coarse;diminished   Rhythm/Pattern, Respiratory tachypnea   Cough Frequency infrequent   Cough Type good;nonproductive;loose   PRE-TX-O2-ETCO2   O2 Device (Oxygen Therapy) nasal cannula w/ humidification   $ Is the patient on Low Flow Oxygen? Yes   Flow (L/min) 2.5   SpO2 (!) 93 %   Pulse Oximetry Type Continuous   $ Pulse Oximetry - Multiple Charge Pulse Oximetry - Multiple   Pulse 91   Resp (!) 28   Aerosol Therapy   $ Aerosol Therapy Charges Aerosol Treatment   Respiratory Treatment Status given   SVN/Inhaler Treatment Route mask   Position During Treatment HOB at 90 degrees   Patient Tolerance good   Post-Treatment   Post-treatment Heart Rate (beats/min) 91   Post-treatment Resp Rate (breaths/min) 28   All Fields Breath Sounds unchanged   Preset CPAP/BiPAP Settings   Mode Of Delivery Standby   Duoneb Q6, NC, Bipap, vitals as charted, tolerated tx well.

## 2018-06-14 NOTE — PROGRESS NOTES
Progress Note  Hospital Medicine  Patient Name:Angel Pineda  MRN:  [id number]  Patient Class: IP- Inpatient  Admit Date: 6/7/2018  Length of Stay: 7 days  Expected Discharge Date:   Attending Physician: Aston Washington MD  Primary Care Provider:  Primary Doctor No    SUBJECTIVE:     Principal Problem: Acute respiratory failure with hypoxia and hypercapnia  Initial history of present illness: Patient is a 73 y.o. male admitted to Hospitalist Service from [facility] Room with complaint of SOB. Patient reportedly has past medical history significant for hypertension and dementia. Patient is a resident of [facility]. Further detail of HPI are not available. Patient's son is at bedside who is POA. He reports, patient had some diarrhea for 1-2 days but no fever. He was started on some antibiotics 2 days ago.     PMH/PSH/SH/FH/Meds: reviewed.    Symptoms/Review of Systems: In ICU, now on 4L/min oxygen via NC. Non-verbal. NGT placed and receiving free water supplementation, Hypernatremia is slightly better.  Diet:  NGT feeding  Activity level: Up with assistance  Pain:  NAD    OBJECTIVE:   Vital Signs (Most Recent):      Temp: 98.7 °F (37.1 °C) (06/14/18 0400)  Pulse: 105 (06/14/18 0947)  Resp: (!) 28 (06/14/18 0947)  BP: 130/63 (06/14/18 0947)  SpO2: (!) 92 % (06/14/18 0947)       Vital Signs Range (Last 24H):  Temp:  [98.1 °F (36.7 °C)-99.3 °F (37.4 °C)]   Pulse:  []   Resp:  [17-42]   BP: (117-172)/(53-79)   SpO2:  [88 %-99 %]     Weight: 105.1 kg (231 lb 11.3 oz)  Body mass index is 30.57 kg/m².    Intake/Output Summary (Last 24 hours) at 06/14/18 1015  Last data filed at 06/14/18 0800   Gross per 24 hour   Intake              800 ml   Output             1440 ml   Net             -640 ml     Physical Examination:  General appearance: well developed, appears stated age,  Head: normocephalic, atraumatic  Eyes:  conjunctivae/corneas clear. PERRL.  Nose: Nares normal. Septum  midline.  Throat: lips, mucosa, and tongue normal; teeth and gums normal, no throat erythema.  Neck: supple, symmetrical, trachea midline, no JVD and thyroid not enlarged, symmetric, no tenderness/mass/nodules  Lungs:  clear to auscultation bilaterally and normal respiratory effort  Chest wall: no tenderness  Heart: regular rate and rhythm, S1, S2 normal, no murmur, click, rub or gallop  Abdomen: soft, non-tender non-distented; bowel sounds normal; no masses,  no organomegaly  Extremities: no cyanosis, clubbing or edema.   Pulses: 2+ and symmetric  Skin: Skin color, texture, turgor normal. No rashes or lesions.  Lymph nodes: Cervical, supraclavicular, and axillary nodes normal.  Neurologic: Advanced dementia, lethargic and unable to respond verbal commands    CBC:    Recent Labs  Lab 06/12/18  0323 06/13/18  0310 06/14/18  0319   WBC 14.30* 12.70 11.60   RBC 4.33* 4.36* 4.15*   HGB 13.1* 13.2* 12.6*   HCT 40.3 40.1 38.1*    227 235   MCV 93 92 92   MCH 30.4 30.3 30.4   MCHC 32.6 32.9 33.1   BMP    Recent Labs  Lab 06/10/18  0349  06/12/18  1138  06/13/18  1840 06/13/18  2345 06/14/18  0319   *  < > 157*  < > 165* 176* 193*  193*   *  < > 153*  < > 148* 148* 149*  149*   K 4.4  < > 3.6  < > 3.6 3.6 3.8  3.8   *  < > 111*  < > 109 108 109  109   CO2 29  < > 33*  < > 31* 32* 32*  32*   BUN 58*  < > 26*  < > 29* 29* 30*  30*   CREATININE 1.1  < > 0.8  < > 0.9 1.0 0.9  0.9   CALCIUM 9.0  < > 8.9  < > 9.2 9.1 9.0  9.0   MG 2.6  --  1.6  --   --   --   --    < > = values in this interval not displayed.   Diagnostic Results:  Microbiology Results (last 7 days)     Procedure Component Value Units Date/Time    Blood culture [[id number]] Collected:  06/10/18 1523    Order Status:  Completed Specimen:  Blood from Antecubital, Right Updated:  06/14/18 0612     Blood Culture, Routine No Growth to date     Blood Culture, Routine No Growth to date     Blood Culture, Routine No Growth to date      Blood Culture, Routine No Growth to date    Blood culture [[id number]] Collected:  06/10/18 1523    Order Status:  Completed Specimen:  Blood from Antecubital, Left  Arm Updated:  06/14/18 0612     Blood Culture, Routine No Growth to date     Blood Culture, Routine No Growth to date     Blood Culture, Routine No Growth to date     Blood Culture, Routine No Growth to date    Culture, Respiratory with Gram Stain [[id number]]  (Susceptibility) Collected:  06/09/18 0453    Order Status:  Completed Specimen:  Respiratory from Tracheal Aspirate Updated:  06/12/18 0954     Respiratory Culture --     ESCHERICHIA COLI  Few       Respiratory Culture --     CANDIDA TROPICALIS  Moderate       Gram Stain (Respiratory) <10 epithelial cells per low power field.     Gram Stain (Respiratory) Rare WBC's     Gram Stain (Respiratory) No organisms seen    Blood culture [[id number]]  (Susceptibility) Collected:  06/07/18 1501    Order Status:  Completed Specimen:  Blood Updated:  06/10/18 0755     Blood Culture, Routine Gram stain peds bottle: Gram positive cocci in chains resembling Strep       Blood Culture, Routine Positive results previously called 06/08/2018  18:18     Blood Culture, Routine STREPTOCOCCUS PNEUMONIAE    Narrative:       Aerobic and anaerobic    Blood culture [[id number]] Collected:  06/07/18 1502    Order Status:  Completed Specimen:  Blood Updated:  06/10/18 0751     Blood Culture, Routine Gram stain peds bottle: Gram positive cocci in chains resembling Strep     Blood Culture, Routine Results called to and read back by: Juan Canales RN 06/08/2018  14:59     Blood Culture, Routine --     STREPTOCOCCUS PNEUMONIAE  For susceptibility see order # [id number]      Narrative:       Aerobic and anaerobic    Culture, Respiratory with Gram Stain [[id number]]     Order Status:  Canceled Specimen:  Respiratory          Chest X-Ray: Right lung predominant interstitial changes may represent asymmetric pulmonary edema or an atypical  infectious process.    ECHO:  · The left ventricle cavity is normal.  · Left ventricle ejection fraction is at 55%  · Normal LV diastolic function.  · RV systolic function is normal.  · Left atrium is mildly dilated.  · RA cavity size is normal.  · Mitral vavlve is normal.  · Aortic valve is normal.  · Pericardium is normal.    CXR: Nasogastric tube as above.  Bibasilar airspace disease and small pleural effusions without significant change.    Assessment/Plan:      *Acute hypoxemic and hypercarbic respiratory failure [J96.01]   Yes    Pneumonia of right lower lobe due to E. Coli sp [J18.1]  Strep. Pneumoniae bacteremia  Supplemental O2 via nasal canula; titrate O2 saturation to >92%. Use BiPAP now. Follow Dr. Douglas's recommendations.  Continue beta 2 agonist bronchodilator treatments.   Continue IV antibiotics - Zosyn. Pharmacist to dose Vancomycin.  Continue routine medications as before.       Yes    Malnutrition of moderate degree [E44.0]  Encourage maximal PO intake. Diet supplementation ordered per nutrition approval. Will encourage PO and monitor closely for weight changes.      Yes    ANÍBAL (acute kidney injury) [N17.9] - resolved  Follow BMP.    Swallow dysfunction  Patient is receiving NGT feeding. Patient has failed swallow testing x 3. I discussed with patient's son again at length and different optioned reviewed including PEG placement if desired. He wants to think about it and will let us know his wishes. He is leaning towards home hospice care.      Yes    Hypernatremia  Follow BMP.  Receiving free-water via NGT.      Yes    Hypertension, uncontrolled [I10]  Tele-monitoring.  Chronic medications and monitor for any changes, adjusting as needed.  Use Hydralazine 10 mg IV q 2 hrs prn SBP > 160 mmHg.     Dementia  Dementia is controlled currently. Continue home dementia meds and non-pharmacologic interventions to prevent delirium (No VS between 11PM-5AM, activity during day, opening blinds, providing  glasses/hearing aids, and up in chair during daytime). Use PRN anti-psychotics to prevent behavior of self harm during sundowning, and avoid narcotics and benzos unless absolutely necessary. PRN anti-psychotics prescribed to avoid self harm behaviors. On Aricept and Namenda.   Yes       DVT prophylaxis: Lovenox 40 mg SQ q day.    Code Status: Full code  Prognosis; poor    VTE Risk Mitigation         Ordered     enoxaparin injection 40 mg  Daily      06/07/18 1720     IP VTE HIGH RISK PATIENT  Once      06/07/18 1720     Place AME hose  Until discontinued      06/07/18 1720     Place sequential compression device  Until discontinued      06/07/18 1720        Aston Washington MD  Department of Hospital Medicine   [facility]    Addendum 12:00 noon.  I had a family meeting with patient's son and daughter in law. I updateed them regarding patient's overall condition not improving. They are agreed, patientis having a poor quality of life and likely is not going to do well. We discussed hospice care and they will be discussing amonst family and will let us know their wishes.We also discussed, if patient does bot swallow well, he may need PEG. Time spent in care of the patient, counseling and coordination of care (Greater than 50% spent in direct face to face contact): 35 min.

## 2018-06-14 NOTE — PROGRESS NOTES
Progress Note  PULMONARY    Admit Date: 6/7/2018 06/14/2018      SUBJECTIVE:     June 9, perseverates , on niv,    Zee 10, as above still, no c/o save not feeling good  June 11, same,   June 12, much calmer but still very confused.  June 13, calmer confused state  June 14, calm but pulled out  ngt with feeding ongoing,   perseverates     PFSH and Allergies reviewed.    OBJECTIVE:     Vitals (Most recent):  Vitals:    06/14/18 1253   BP:    Pulse: 95   Resp: (!) 26   Temp:        Vitals (24 hour range):  Temp:  [98.3 °F (36.8 °C)-99.4 °F (37.4 °C)]   Pulse:  []   Resp:  [17-42]   BP: (117-172)/(53-79)   SpO2:  [88 %-99 %]       Intake/Output Summary (Last 24 hours) at 06/14/18 1315  Last data filed at 06/14/18 1130   Gross per 24 hour   Intake              650 ml   Output             1715 ml   Net            -1065 ml          Physical Exam:  The patient's neuro status (alertness,orientation,cognitive function,motor skills,), pharyngeal exam (oral lesions, hygiene, abn dentition,), Neck (jvd,mass,thyroid,nodes in neck and above/below clavicle),RESPIRATORY(symmetry,effort,fremitus,percussion,auscultation),  Cor(rhythm,heart tones including gallops,perfusion,edema)ABD(distention,hepatic&splenomegaly,tenderness,masses), Skin(rash,cyanosis),Psyc(affect,judgement,).  Exam negative except for these pertinent findings:    Alert but   confused, on nc, chest is symmetric, no distress, normal percussion, normal fremitus andrattle and rhonchus. No edema, cor rrr, no murmur     Radiographs reviewed: view by direct vision  - impressive right lung infiltrate c/w aspiration pneumonia- no new 6/10  Results for orders placed during the hospital encounter of 06/07/18   X-Ray Chest 1 View    Narrative EXAMINATION:  XR CHEST 1 VIEW    CLINICAL HISTORY:  low o2 sat;    TECHNIQUE:  Single frontal view of the chest was performed.    COMPARISON:  Chest of June 7, 2018.    FINDINGS:  The cardiac size is within normal limits.   Calcification is noted in the aorta.  There is increased density in the right lower lobe infiltrate consistent with pneumonia and possible small pleural effusion.  The left lung is relatively clear.  No pneumothorax is seen.      Impression Increasing density of right lower lobe infiltrate and probable pleural effusion.  Atherosclerosis.      Electronically signed by: Juan Collier MD  Date:    06/08/2018  Time:    10:51   ]    Labs       Recent Labs  Lab 06/14/18 0319   WBC 11.60   HGB 12.6*   HCT 38.1*          Recent Labs  Lab 06/14/18 0319 06/14/18  1138   *  149* 151*   K 3.8  3.8 3.8     109 111*   CO2 32*  32* 32*   BUN 30*  30* 30*   CREATININE 0.9  0.9 0.9   *  193* 149*   CALCIUM 9.0  9.0 9.1   AST 50*  --    ALT 50*  --    ALKPHOS 66  --    BILITOT 0.5  --    PROT 6.0  --    ALBUMIN 1.9*  --      No results for input(s): PH, PCO2, PO2, HCO3 in the last 24 hours.  Microbiology Results (last 7 days)     Procedure Component Value Units Date/Time    Blood culture [[id number]] Collected:  06/10/18 1523    Order Status:  Completed Specimen:  Blood from Antecubital, Right Updated:  06/14/18 0612     Blood Culture, Routine No Growth to date     Blood Culture, Routine No Growth to date     Blood Culture, Routine No Growth to date     Blood Culture, Routine No Growth to date    Blood culture [[id number]] Collected:  06/10/18 1523    Order Status:  Completed Specimen:  Blood from Antecubital, Left  Arm Updated:  06/14/18 0612     Blood Culture, Routine No Growth to date     Blood Culture, Routine No Growth to date     Blood Culture, Routine No Growth to date     Blood Culture, Routine No Growth to date    Culture, Respiratory with Gram Stain [[id number]]  (Susceptibility) Collected:  06/09/18 1072    Order Status:  Completed Specimen:  Respiratory from Tracheal Aspirate Updated:  06/12/18 0954     Respiratory Culture --     ESCHERICHIA COLI  Few       Respiratory Culture --      JAROD TROPICALIS  Moderate       Gram Stain (Respiratory) <10 epithelial cells per low power field.     Gram Stain (Respiratory) Rare WBC's     Gram Stain (Respiratory) No organisms seen    Blood culture [[id number]]  (Susceptibility) Collected:  06/07/18 1501    Order Status:  Completed Specimen:  Blood Updated:  06/10/18 0755     Blood Culture, Routine Gram stain peds bottle: Gram positive cocci in chains resembling Strep       Blood Culture, Routine Positive results previously called 06/08/2018  18:18     Blood Culture, Routine STREPTOCOCCUS PNEUMONIAE    Narrative:       Aerobic and anaerobic    Blood culture [[id number]] Collected:  06/07/18 1502    Order Status:  Completed Specimen:  Blood Updated:  06/10/18 0751     Blood Culture, Routine Gram stain peds bottle: Gram positive cocci in chains resembling Strep     Blood Culture, Routine Results called to and read back by: Juan Canales RN 06/08/2018  14:59     Blood Culture, Routine --     STREPTOCOCCUS PNEUMONIAE  For susceptibility see order # [id number]      Narrative:       Aerobic and anaerobic    Culture, Respiratory with Gram Stain [[id number]]     Order Status:  Canceled Specimen:  Respiratory           Impression:  Active Hospital Problems    Diagnosis  POA    *Acute respiratory failure with hypoxia and hypercapnia [J96.01, J96.02]  Yes    Dementia with behavioral disturbance [F03.91]  Yes    Hypernatremia [E87.0]  No    Bacteremia [R78.81]  Yes    Pneumococcal bacteremia [R78.81]  Yes    COPD with acute lower respiratory infection [J44.0]  Yes    Pneumonia of right lower lobe due to infectious organism [J18.1]  Yes    Malnutrition of moderate degree [E44.0]  Yes    ANÍBAL (acute kidney injury) [N17.9]  Yes    Hypertension [I10]  Yes      Resolved Hospital Problems    Diagnosis Date Resolved POA    Hyponatremia [E87.1] 06/10/2018 Yes     Plan:   June 9, expect aspiration, still too unstable to get off bipap.  [place] remains poor.  Cont abx and  observe.    Zee 10 , very alert , confused, on bipap still.  Stop vanc with gnr in sputum, and strep pneumo in blood.  [place] remains poor.      June 11, resp seems slowly better but bed bound and very demented.  Delirium issue.  May improve but doubt will do well.  June 12, ecoli in sputum and pneumococcus in blood.  No new recs  June 13, will f/u cxr am, peg may be reasonable?? Hospice seems reasonable if family directs.  June 14, aspirated tube feed , poor outlook - son considering hospice or ?\peg                  .

## 2018-06-15 LAB
ALBUMIN SERPL BCP-MCNC: 2 G/DL
ALP SERPL-CCNC: 68 U/L
ALT SERPL W/O P-5'-P-CCNC: 68 U/L
ANION GAP SERPL CALC-SCNC: 6 MMOL/L
ANION GAP SERPL CALC-SCNC: 8 MMOL/L
ANION GAP SERPL CALC-SCNC: 8 MMOL/L
AST SERPL-CCNC: 53 U/L
BASOPHILS # BLD AUTO: 0 K/UL
BASOPHILS NFR BLD: 0.2 %
BILIRUB SERPL-MCNC: 0.5 MG/DL
BUN SERPL-MCNC: 27 MG/DL
BUN SERPL-MCNC: 28 MG/DL
BUN SERPL-MCNC: 29 MG/DL
CALCIUM SERPL-MCNC: 8.9 MG/DL
CALCIUM SERPL-MCNC: 9 MG/DL
CALCIUM SERPL-MCNC: 9.1 MG/DL
CHLORIDE SERPL-SCNC: 108 MMOL/L
CHLORIDE SERPL-SCNC: 109 MMOL/L
CHLORIDE SERPL-SCNC: 110 MMOL/L
CO2 SERPL-SCNC: 32 MMOL/L
CO2 SERPL-SCNC: 32 MMOL/L
CO2 SERPL-SCNC: 33 MMOL/L
CO2 SERPL-SCNC: 33 MMOL/L
CO2 SERPL-SCNC: 34 MMOL/L
CREAT SERPL-MCNC: 0.9 MG/DL
CREAT SERPL-MCNC: 0.9 MG/DL
CREAT SERPL-MCNC: 1 MG/DL
DIFFERENTIAL METHOD: ABNORMAL
EOSINOPHIL # BLD AUTO: 0.2 K/UL
EOSINOPHIL NFR BLD: 1.5 %
ERYTHROCYTE [DISTWIDTH] IN BLOOD BY AUTOMATED COUNT: 15.8 %
EST. GFR  (AFRICAN AMERICAN): >60 ML/MIN/1.73 M^2
EST. GFR  (NON AFRICAN AMERICAN): >60 ML/MIN/1.73 M^2
GLUCOSE SERPL-MCNC: 155 MG/DL
GLUCOSE SERPL-MCNC: 187 MG/DL
GLUCOSE SERPL-MCNC: 190 MG/DL
GLUCOSE SERPL-MCNC: 199 MG/DL
GLUCOSE SERPL-MCNC: 199 MG/DL
HCT VFR BLD AUTO: 37.5 %
HGB BLD-MCNC: 12.5 G/DL
LYMPHOCYTES # BLD AUTO: 1.1 K/UL
LYMPHOCYTES NFR BLD: 8.7 %
MCH RBC QN AUTO: 30.6 PG
MCHC RBC AUTO-ENTMCNC: 33.3 G/DL
MCV RBC AUTO: 92 FL
MONOCYTES # BLD AUTO: 0.7 K/UL
MONOCYTES NFR BLD: 5.7 %
NEUTROPHILS # BLD AUTO: 10.4 K/UL
NEUTROPHILS NFR BLD: 83.9 %
PLATELET # BLD AUTO: 245 K/UL
PMV BLD AUTO: 9.9 FL
POTASSIUM SERPL-SCNC: 3.6 MMOL/L
POTASSIUM SERPL-SCNC: 3.7 MMOL/L
POTASSIUM SERPL-SCNC: 4 MMOL/L
PROT SERPL-MCNC: 6.4 G/DL
RBC # BLD AUTO: 4.08 M/UL
SODIUM SERPL-SCNC: 148 MMOL/L
SODIUM SERPL-SCNC: 149 MMOL/L
SODIUM SERPL-SCNC: 150 MMOL/L
WBC # BLD AUTO: 12.4 K/UL

## 2018-06-15 PROCEDURE — 80053 COMPREHEN METABOLIC PANEL: CPT

## 2018-06-15 PROCEDURE — C9113 INJ PANTOPRAZOLE SODIUM, VIA: HCPCS | Performed by: INTERNAL MEDICINE

## 2018-06-15 PROCEDURE — 63600175 PHARM REV CODE 636 W HCPCS: Performed by: INTERNAL MEDICINE

## 2018-06-15 PROCEDURE — 97803 MED NUTRITION INDIV SUBSEQ: CPT

## 2018-06-15 PROCEDURE — 36415 COLL VENOUS BLD VENIPUNCTURE: CPT

## 2018-06-15 PROCEDURE — 85025 COMPLETE CBC W/AUTO DIFF WBC: CPT

## 2018-06-15 PROCEDURE — 25000242 PHARM REV CODE 250 ALT 637 W/ HCPCS: Performed by: INTERNAL MEDICINE

## 2018-06-15 PROCEDURE — 25000003 PHARM REV CODE 250: Performed by: NURSE PRACTITIONER

## 2018-06-15 PROCEDURE — 25000003 PHARM REV CODE 250: Performed by: EMERGENCY MEDICINE

## 2018-06-15 PROCEDURE — 99900035 HC TECH TIME PER 15 MIN (STAT)

## 2018-06-15 PROCEDURE — 25000003 PHARM REV CODE 250: Performed by: INTERNAL MEDICINE

## 2018-06-15 PROCEDURE — 20000000 HC ICU ROOM

## 2018-06-15 PROCEDURE — 94761 N-INVAS EAR/PLS OXIMETRY MLT: CPT

## 2018-06-15 PROCEDURE — 94640 AIRWAY INHALATION TREATMENT: CPT

## 2018-06-15 PROCEDURE — 94660 CPAP INITIATION&MGMT: CPT

## 2018-06-15 PROCEDURE — 80048 BASIC METABOLIC PNL TOTAL CA: CPT | Mod: 91

## 2018-06-15 PROCEDURE — 27000221 HC OXYGEN, UP TO 24 HOURS

## 2018-06-15 RX ADMIN — METOPROLOL TARTRATE 5 MG: 5 INJECTION, SOLUTION INTRAVENOUS at 05:06

## 2018-06-15 RX ADMIN — LAMOTRIGINE 25 MG: 25 TABLET ORAL at 08:06

## 2018-06-15 RX ADMIN — PIPERACILLIN AND TAZOBACTAM 3.38 G: 3; .375 INJECTION, POWDER, LYOPHILIZED, FOR SOLUTION INTRAVENOUS; PARENTERAL at 05:06

## 2018-06-15 RX ADMIN — MEMANTINE HYDROCHLORIDE 5 MG: 5 TABLET ORAL at 08:06

## 2018-06-15 RX ADMIN — METOPROLOL TARTRATE 5 MG: 5 INJECTION, SOLUTION INTRAVENOUS at 01:06

## 2018-06-15 RX ADMIN — NYSTATIN 500000 UNITS: 100000 SUSPENSION ORAL at 01:06

## 2018-06-15 RX ADMIN — QUETIAPINE FUMARATE 25 MG: 25 TABLET ORAL at 09:06

## 2018-06-15 RX ADMIN — LISINOPRIL 40 MG: 40 TABLET ORAL at 08:06

## 2018-06-15 RX ADMIN — PANTOPRAZOLE SODIUM 40 MG: 40 INJECTION, POWDER, LYOPHILIZED, FOR SOLUTION INTRAVENOUS at 08:06

## 2018-06-15 RX ADMIN — ENOXAPARIN SODIUM 40 MG: 100 INJECTION SUBCUTANEOUS at 05:06

## 2018-06-15 RX ADMIN — MEMANTINE HYDROCHLORIDE 5 MG: 5 TABLET ORAL at 09:06

## 2018-06-15 RX ADMIN — PIPERACILLIN AND TAZOBACTAM 3.38 G: 3; .375 INJECTION, POWDER, LYOPHILIZED, FOR SOLUTION INTRAVENOUS; PARENTERAL at 08:06

## 2018-06-15 RX ADMIN — NYSTATIN 500000 UNITS: 100000 SUSPENSION ORAL at 09:06

## 2018-06-15 RX ADMIN — IPRATROPIUM BROMIDE AND ALBUTEROL SULFATE 3 ML: .5; 3 SOLUTION RESPIRATORY (INHALATION) at 01:06

## 2018-06-15 RX ADMIN — TRAMADOL HYDROCHLORIDE 50 MG: 50 TABLET, FILM COATED ORAL at 01:06

## 2018-06-15 RX ADMIN — FOLIC ACID 1 MG: 1 TABLET ORAL at 08:06

## 2018-06-15 RX ADMIN — ACETAMINOPHEN 650 MG: 325 TABLET, FILM COATED ORAL at 03:06

## 2018-06-15 RX ADMIN — PIPERACILLIN AND TAZOBACTAM 3.38 G: 3; .375 INJECTION, POWDER, LYOPHILIZED, FOR SOLUTION INTRAVENOUS; PARENTERAL at 01:06

## 2018-06-15 RX ADMIN — IPRATROPIUM BROMIDE AND ALBUTEROL SULFATE 3 ML: .5; 3 SOLUTION RESPIRATORY (INHALATION) at 07:06

## 2018-06-15 RX ADMIN — NYSTATIN 500000 UNITS: 100000 SUSPENSION ORAL at 08:06

## 2018-06-15 RX ADMIN — IPRATROPIUM BROMIDE AND ALBUTEROL SULFATE 3 ML: .5; 3 SOLUTION RESPIRATORY (INHALATION) at 12:06

## 2018-06-15 RX ADMIN — NYSTATIN 500000 UNITS: 100000 SUSPENSION ORAL at 05:06

## 2018-06-15 RX ADMIN — IPRATROPIUM BROMIDE AND ALBUTEROL SULFATE 3 ML: .5; 3 SOLUTION RESPIRATORY (INHALATION) at 11:06

## 2018-06-15 RX ADMIN — DIPHENOXYLATE HYDROCHLORIDE AND ATROPINE SULFATE 1 TABLET: 2.5; .025 TABLET ORAL at 01:06

## 2018-06-15 RX ADMIN — AMLODIPINE BESYLATE 10 MG: 5 TABLET ORAL at 08:06

## 2018-06-15 RX ADMIN — LAMOTRIGINE 25 MG: 25 TABLET ORAL at 09:06

## 2018-06-15 RX ADMIN — DONEPEZIL HYDROCHLORIDE 10 MG: 5 TABLET, FILM COATED ORAL at 09:06

## 2018-06-15 NOTE — PROGRESS NOTES
Progress Note  Pulmonary/Critical Care Medicine    Admit Date: 6/7/2018    SUBJECTIVE:     Follow-up For:  Aspiration pneumonia and pleural effusion    Review of Systems:  Review of systems not obtained due to patient factors patient is delirious.    OBJECTIVE:     Vital Signs (Most Recent)  Temp: 99 °F (37.2 °C) (06/15/18 1656)  Pulse: 91 (06/15/18 1548)  Resp: (!) 22 (06/15/18 1548)  BP: (!) 176/79 (06/15/18 1548)  SpO2: (!) 93 % (06/15/18 1548)    I & O (Last 24H):  Intake/Output Summary (Last 24 hours) at 06/15/18 1659  Last data filed at 06/15/18 0831   Gross per 24 hour   Intake             1400 ml   Output             1375 ml   Net               25 ml       Physical Exam:  General: well developed, well nourished  HENT: Head:normocephalic, atraumatic.   Ears:right ear normal, left ear normal.   Nose: with NG tube   Throat: lips, mucosa, and tongue normal  Eyes: conjunctivae/corneas clear. PERRL.   Lungs:  clear to auscultation bilaterally and normal respiratory effort  Cardiovascular: Heart: regular rate and rhythm, S1, S2 normal, no murmur,   Chest Wall: no tenderness.   Extremities: no cyanosis or edema, or clubbing.   Pulses: 2+ and symmetric.  Abdomen: soft, non-tender non-distented; bowel sounds normal; no masses,  no organomegaly.   Skin: Skin color, texture, turgor normal. No rashes or lesions  Musculoskeletal:no clubbing, cyanosis  Neurologic: Mental status: Confused  Psych/Behavioral:  perseverating    Laboratory:  CBC:    Recent Labs  Lab 06/15/18  0610   WBC 12.40   RBC 4.08*   HGB 12.5*   HCT 37.5*      MCV 92   MCH 30.6   MCHC 33.3     CMP    Recent Labs  Lab 06/15/18  0610 06/15/18  1137   CALCIUM 9.0  9.0 9.1   ALBUMIN 2.0*  --    PROT 6.4  --    *  149* 149*   K 4.0  4.0 4.0   CO2 33*  33* 34*     110 109   BUN 29*  29* 28*   CREATININE 1.0  1.0 1.0   ALKPHOS 68  --    ALT 68*  --    AST 53*  --    BILITOT 0.5  --          Diagnostic Results:  Labs: Reviewed  X-Ray:  Reviewed    ASSESSMENT/PLAN:   Aspiration pneumonia and pleural effusion      Plans for hospice on Monday noted

## 2018-06-15 NOTE — PLAN OF CARE
"Problem: Patient Care Overview  Goal: Plan of Care Review  Outcome: Ongoing (interventions implemented as appropriate)  Pt had two loose BM's. Pt cleaned up brief on. Complete bed change times 2. NG tube remains to right nare. Peg tube feedings infusing via NG tube. Pt constant asking for water or orange juice. Constant reminder that he is Npo and can not have any water or OJ. Pt compliant with turns during BM cleaning. Pt does try to grab at face during turns when restraints are off during cleaning. Pt with said " B,b,b,b,b,b"  for about thirty minutes before calming down.       "

## 2018-06-15 NOTE — PROGRESS NOTES
06/14/18 1924   Patient Assessment/Suction   Level of Consciousness (AVPU) alert   Respiratory Effort Unlabored   Expansion/Accessory Muscles/Retractions no retractions;no use of accessory muscles   All Lung Fields Breath Sounds crackles fine;coarse;diminished   Rhythm/Pattern, Respiratory unlabored   Cough Frequency infrequent   Cough Type productive;good   Suction Method not required   Sputum Amount swallowed   PRE-TX-O2-ETCO2   O2 Device (Oxygen Therapy) nasal cannula w/ humidification   $ Is the patient on Low Flow Oxygen? Yes   Flow (L/min) 3   SpO2 100 %   Pulse Oximetry Type Continuous   $ Pulse Oximetry - Multiple Charge Pulse Oximetry - Multiple   Oximetry Probe Site Intact;No Change Needed   Pulse 85   Resp (!) 25   Aerosol Therapy   $ Aerosol Therapy Charges Aerosol Treatment   Respiratory Treatment Status given   SVN/Inhaler Treatment Route mask   Position During Treatment HOB at 45 degrees   Patient Tolerance good   Post-Treatment   Post-treatment Heart Rate (beats/min) 88   Post-treatment Resp Rate (breaths/min) 20   All Fields Breath Sounds aeration increased

## 2018-06-15 NOTE — PLAN OF CARE
Received call from Alyssa with hospice, [facility] can not accept a new admit over the weekend. We will anticipate discharge on Monday....OWEN Colin       06/15/18 9322   Discharge Reassessment   Assessment Type Discharge Planning Reassessment

## 2018-06-15 NOTE — ASSESSMENT & PLAN NOTE
Malnutrition in the context of acute on chronic illness    Related to (etiology):  Pulmonary dysfunction and inability to consume sufficient energy     Signs and Symptoms (as evidenced by):  Weight loss: 4.39% x 2 1/2 weeks (5/24/18, 109.3 kg and 6/11/18, 104.5 kg)   Energy Intake: <50% of estimated energy requirement for >5 days   Fluid Accumulation: mild, edema 2+  Wound: Stage 2 sacral spine pressure injury    Interventions/Recommendations (treatment strategy):  Initiate nutrition support    Nutrition Diagnosis Status:  continues

## 2018-06-15 NOTE — PLAN OF CARE
Spoke with Alyssa with [facility] regarding the discharge plan; she states at this point it does not look like [place] [facility] is going to be able to meet the pt's needs.   Alyssa has contact [facility] at [place] to move the pt there, they have a bed and the son can go tomorrow to sign the paperwork.   At this point the son wants to continue care in the hospital and does not want the pt to be comfort care while in the hospital.   I asked Alyssa to follow up with me this afternoon to let me know if [facility] will be able to accept the pt over the weekend....OWEN Camacho       06/15/18 8757   Discharge Reassessment   Assessment Type Discharge Planning Reassessment

## 2018-06-15 NOTE — PLAN OF CARE
Problem: Patient Care Overview  Goal: Plan of Care Review  Outcome: Ongoing (interventions implemented as appropriate)  Patient remains on Bipap with ipap 18, Epap 8 and fi02 .5.  Hr 90 and 02 saturation 99% .  Patient receiving aerosol via duoneb now and q6hr.

## 2018-06-15 NOTE — PLAN OF CARE
Spoke with pt's son, Yaron (ph#[phone]) regarding decision for PEG or hospice. He has decided that he does NOT want to place a PEG tube, he wants the pt to return to [facility] with [facility]. I did explain that if that was his plan the pt would need to be a FULL hospice pt including being a DNR, Yaron did verbalize understanding and agreement. He spoke with Alyssa at Hospice who is suppose to be following up with [facility] to see if they can meet the pt's needs.   I spoke with Alyssa myself who is waiting for a call back from [facility] today with a decision. They had concerns about the pt feeding himself, being ambulatory and requiring continuous oxygen.   If [facility] can not take the pt back, Alyssa states that pt's son wants to pursue placement in another assisted living and she can assist with that.   Alyssa is going to call me back after she speaks with [facility].....OWEN Colin       06/15/18 0907   Discharge Reassessment   Assessment Type Discharge Planning Reassessment

## 2018-06-15 NOTE — PROGRESS NOTES
" [facility]  Adult Nutrition  Progress Note    SUMMARY       Recommendations    1)  Continue Impact Peptide 1.5 @ l 50 mls/hr. Flush with 250 mls Q4 hrs.    Provides 1800 calories, 113 gms protein, 168 gms CHO, 924 mls free water. (Total fluid with flushes = 2424 mls/day)    Goals: 1)  Pt will receive at least 80% EEN within 72 hrs.  2) Pt will tolerate TFs at goal rate during admit.   Nutrition Goal Status: 1) met 2) met/ongoing  Communication of RD Recs: POC    Reason for Assessment    Reason for Assessment: RD follow up  1. Pneumonia of right lower lobe due to infectious organism    2. Shortness of breath    3. Bacteremia    4. Acute respiratory failure with hypoxia and hypercapnia    5. Alzheimer's dementia without behavioral disturbance, unspecified timing of dementia onset    6. COPD with acute lower respiratory infection      Past Medical History:   Diagnosis Date    CHF (congestive heart failure)     COPD (chronic obstructive pulmonary disease)     Dementia     Hypertension      General Information Comments: Admitted with SOB. Per chart pt had diarrhea x 2 days prior to admit.    6/15/18: Pt tolerating enteral feeds at goal rate.  Per SLP eval, pt is to be NPO.   Per chart review, son is to decide for pt to have PEG vs hospice.     Nutrition Risk Screen    Nutrition Risk Screen: no indicators present    Nutrition/Diet History    Do you have any cultural, spiritual, Jewish conflicts, given your current situation?: ARNOLDO  Food Allergies: NKFA  Factors Affecting Nutritional Intake: NPO (per SLP rec)    Anthropometrics    Temp: 99.6 °F (37.6 °C)  Height Method: Measured  Height: 6' 1"  Height (inches): 73 in  Weight Method: Bed Scale  Weight: 105.1 kg (231 lb 11.3 oz)  Weight (lb): 231.71 lb  Ideal Body Weight (IBW), Male: 184 lb  % Ideal Body Weight, Male (lb): 125.21 lb  BMI (Calculated): 30.5  BMI Grade: 30 - 34.9- obesity - grade I  Weight Loss: unintentional  Usual Body Weight " (UBW), k.3 kg  % Usual Body Weight: 95.81  % Weight Change From Usual Weight: -4.39 %  Weight Loss Since Admission: 3 lb 12 oz (1.97% x 6 days)  % Weight Change Since Admission: 1.63       Lab/Procedures/Meds    Pertinent Labs Reviewed: reviewed  BMP  Lab Results   Component Value Date     (H) 06/15/2018    K 4.0 06/15/2018     06/15/2018    CO2 34 (H) 06/15/2018    BUN 28 (H) 06/15/2018    CREATININE 1.0 06/15/2018    CALCIUM 9.1 06/15/2018    ANIONGAP 6 (L) 06/15/2018    ESTGFRAFRICA >60 06/15/2018    EGFRNONAA >60 06/15/2018     Lab Results   Component Value Date    ALBUMIN 2.0 (L) 06/15/2018     No results found for: CRP  Lab Results   Component Value Date    WBC 12.40 06/15/2018     Pertinent Medications Reviewed: reviewed  Scheduled Meds:   albuterol-ipratropium  3 mL Nebulization Q6H    amLODIPine  10 mg Oral Daily    donepezil  10 mg Oral QHS    enoxaparin  40 mg Subcutaneous Daily    folic acid  1 mg Oral Daily    lamoTRIgine  25 mg Oral BID    lisinopril  40 mg Oral Daily    memantine  5 mg Oral BID    metoprolol  5 mg Intravenous Q6H    nystatin  500,000 Units Mouth/Throat QID    pantoprazole  40 mg Intravenous Daily    piperacillin-tazobactam (ZOSYN) IVPB  3.375 g Intravenous Q8H    QUEtiapine  25 mg Oral QHS     Continuous Infusions:      Physical Findings/Assessment    Overall Physical Appearance:  (2+ edema)  Skin: edema, pressure ulcer(s) (Nain score 12.  Stage 2 pressure ulcer on sacral spine)    Estimated/Assessed Needs    Weight Used For Calorie Calculations: 104.5 kg (230 lb 6.1 oz)     Energy Need Method:  [facility]: 7198 - 5597 (1.0-1.25)     Weight Used For Protein Calculations: 83.6 kg (184 lb 6 oz) (IBW)   1.2-1.5 gm/kg/day: 101-126     Fluid Need Method: RDA Method (or per MD rec)     CHO Requirement: n/a      Nutrition Prescription Ordered    Current Diet Order: NPO  Current Nutrition Support Formula Ordered: Impact Peptide 1.5 @ 50 mls/hr      Evaluation of Received Nutrient/Fluid Intake    Enteral Feeding: Impact Peptide 1.5 @ 50 mls/hr with water flushes 150 mls Q 4 hrs. Provides 1800 calories, 113 gms protein, 168 gms CHO, 924 mls free water.   Energy Calories Required:  meeting needs  Protein Required: meeting needs  Fluid Required: meeting needs  % Intake of Estimated Energy Needs: %  % Meal Intake: NPO    Nutrition Risk    Level of Risk/Frequency of Follow-up:  (2 x wkly)     Assessment and Plan    Malnutrition of moderate degree    Malnutrition in the context of acute on chronic illness    Related to (etiology):  Pulmonary dysfunction and inability to consume sufficient energy     Signs and Symptoms (as evidenced by):  Weight loss: 4.39% x 2 1/2 weeks (5/24/18, 109.3 kg and 6/11/18, 104.5 kg)   Energy Intake: <50% of estimated energy requirement for >5 days   Fluid Accumulation: mild, edema 2+  Wound: Stage 2 sacral spine pressure injury    Interventions/Recommendations (treatment strategy):  Initiate nutrition support    Nutrition Diagnosis Status:  continues                 Monitor and Evaluation    Food and Nutrient Intake: energy intake  Food and Nutrient Adminstration: diet order  Anthropometric Measurements: weight, weight change  Biochemical Data, Medical Tests and Procedures: inflammatory profile, electrolyte and renal panel  Nutrition-Focused Physical Findings: overall appearance, skin     Discharge Plan  Hospice vs PEG    RD Follow-up?: Yes

## 2018-06-15 NOTE — PLAN OF CARE
Problem: Nutrition, Enteral (Adult)  Intervention: Monitor/Manage Nutrition Support  Recommendations    1)  Continue Impact Peptide 1.5 @ l 50 mls/hr. Flush with 250 mls Q4 hrs.    Provides 1800 calories, 113 gms protein, 168 gms CHO, 924 mls free water. (Total fluid with flushes = 2424 mls/day)    Goals: 1)  Pt will receive at least 80% EEN within 72 hrs.  2) Pt will tolerate TFs at goal rate during admit.   Nutrition Goal Status: 1) met 2) met/ongoing  Communication of RD Recs: POC

## 2018-06-16 LAB
ALBUMIN SERPL BCP-MCNC: 1.9 G/DL
ALP SERPL-CCNC: 66 U/L
ALT SERPL W/O P-5'-P-CCNC: 64 U/L
ANION GAP SERPL CALC-SCNC: 5 MMOL/L
ANION GAP SERPL CALC-SCNC: 5 MMOL/L
ANION GAP SERPL CALC-SCNC: 6 MMOL/L
ANION GAP SERPL CALC-SCNC: 7 MMOL/L
AST SERPL-CCNC: 43 U/L
BACTERIA BLD CULT: NORMAL
BACTERIA BLD CULT: NORMAL
BASOPHILS # BLD AUTO: 0.1 K/UL
BASOPHILS NFR BLD: 0.7 %
BILIRUB SERPL-MCNC: 0.4 MG/DL
BUN SERPL-MCNC: 24 MG/DL
BUN SERPL-MCNC: 25 MG/DL
BUN SERPL-MCNC: 25 MG/DL
BUN SERPL-MCNC: 27 MG/DL
CALCIUM SERPL-MCNC: 8.7 MG/DL
CALCIUM SERPL-MCNC: 8.7 MG/DL
CALCIUM SERPL-MCNC: 8.9 MG/DL
CALCIUM SERPL-MCNC: 8.9 MG/DL
CHLORIDE SERPL-SCNC: 106 MMOL/L
CHLORIDE SERPL-SCNC: 107 MMOL/L
CHLORIDE SERPL-SCNC: 107 MMOL/L
CHLORIDE SERPL-SCNC: 108 MMOL/L
CO2 SERPL-SCNC: 31 MMOL/L
CO2 SERPL-SCNC: 32 MMOL/L
CO2 SERPL-SCNC: 32 MMOL/L
CO2 SERPL-SCNC: 33 MMOL/L
CREAT SERPL-MCNC: 0.7 MG/DL
CREAT SERPL-MCNC: 0.8 MG/DL
CREAT SERPL-MCNC: 0.8 MG/DL
CREAT SERPL-MCNC: 0.9 MG/DL
DIFFERENTIAL METHOD: ABNORMAL
EOSINOPHIL # BLD AUTO: 0.2 K/UL
EOSINOPHIL NFR BLD: 1.5 %
ERYTHROCYTE [DISTWIDTH] IN BLOOD BY AUTOMATED COUNT: 14.9 %
EST. GFR  (AFRICAN AMERICAN): >60 ML/MIN/1.73 M^2
EST. GFR  (NON AFRICAN AMERICAN): >60 ML/MIN/1.73 M^2
GLUCOSE SERPL-MCNC: 174 MG/DL
GLUCOSE SERPL-MCNC: 186 MG/DL
GLUCOSE SERPL-MCNC: 194 MG/DL
GLUCOSE SERPL-MCNC: 194 MG/DL
HCT VFR BLD AUTO: 36.6 %
HGB BLD-MCNC: 12.1 G/DL
LYMPHOCYTES # BLD AUTO: 1.1 K/UL
LYMPHOCYTES NFR BLD: 10.3 %
MCH RBC QN AUTO: 30.6 PG
MCHC RBC AUTO-ENTMCNC: 33.2 G/DL
MCV RBC AUTO: 92 FL
MONOCYTES # BLD AUTO: 0.4 K/UL
MONOCYTES NFR BLD: 3.8 %
NEUTROPHILS # BLD AUTO: 8.9 K/UL
NEUTROPHILS NFR BLD: 83.7 %
PLATELET # BLD AUTO: 253 K/UL
PMV BLD AUTO: 9.6 FL
POTASSIUM SERPL-SCNC: 3.4 MMOL/L
POTASSIUM SERPL-SCNC: 3.6 MMOL/L
PROT SERPL-MCNC: 6.2 G/DL
RBC # BLD AUTO: 3.96 M/UL
SODIUM SERPL-SCNC: 144 MMOL/L
SODIUM SERPL-SCNC: 147 MMOL/L
WBC # BLD AUTO: 10.6 K/UL

## 2018-06-16 PROCEDURE — 99900035 HC TECH TIME PER 15 MIN (STAT)

## 2018-06-16 PROCEDURE — 94761 N-INVAS EAR/PLS OXIMETRY MLT: CPT

## 2018-06-16 PROCEDURE — 27201109 HC SYSTEM FECAL MANAGEMENT

## 2018-06-16 PROCEDURE — 92610 EVALUATE SWALLOWING FUNCTION: CPT

## 2018-06-16 PROCEDURE — C9113 INJ PANTOPRAZOLE SODIUM, VIA: HCPCS | Performed by: INTERNAL MEDICINE

## 2018-06-16 PROCEDURE — 25000003 PHARM REV CODE 250: Performed by: INTERNAL MEDICINE

## 2018-06-16 PROCEDURE — 27000221 HC OXYGEN, UP TO 24 HOURS

## 2018-06-16 PROCEDURE — 80048 BASIC METABOLIC PNL TOTAL CA: CPT

## 2018-06-16 PROCEDURE — 85025 COMPLETE CBC W/AUTO DIFF WBC: CPT

## 2018-06-16 PROCEDURE — 80053 COMPREHEN METABOLIC PANEL: CPT

## 2018-06-16 PROCEDURE — 94640 AIRWAY INHALATION TREATMENT: CPT

## 2018-06-16 PROCEDURE — 20000000 HC ICU ROOM

## 2018-06-16 PROCEDURE — 36415 COLL VENOUS BLD VENIPUNCTURE: CPT

## 2018-06-16 PROCEDURE — 94760 N-INVAS EAR/PLS OXIMETRY 1: CPT

## 2018-06-16 PROCEDURE — 25000003 PHARM REV CODE 250: Performed by: NURSE PRACTITIONER

## 2018-06-16 PROCEDURE — 25000242 PHARM REV CODE 250 ALT 637 W/ HCPCS: Performed by: INTERNAL MEDICINE

## 2018-06-16 PROCEDURE — 63600175 PHARM REV CODE 636 W HCPCS: Performed by: INTERNAL MEDICINE

## 2018-06-16 RX ADMIN — METOPROLOL TARTRATE 5 MG: 5 INJECTION, SOLUTION INTRAVENOUS at 06:06

## 2018-06-16 RX ADMIN — HALOPERIDOL LACTATE 2.5 MG: 5 INJECTION, SOLUTION INTRAMUSCULAR at 10:06

## 2018-06-16 RX ADMIN — NYSTATIN 500000 UNITS: 100000 SUSPENSION ORAL at 09:06

## 2018-06-16 RX ADMIN — IPRATROPIUM BROMIDE AND ALBUTEROL SULFATE 3 ML: .5; 3 SOLUTION RESPIRATORY (INHALATION) at 01:06

## 2018-06-16 RX ADMIN — IPRATROPIUM BROMIDE AND ALBUTEROL SULFATE 3 ML: .5; 3 SOLUTION RESPIRATORY (INHALATION) at 07:06

## 2018-06-16 RX ADMIN — LISINOPRIL 40 MG: 40 TABLET ORAL at 09:06

## 2018-06-16 RX ADMIN — MEMANTINE HYDROCHLORIDE 5 MG: 5 TABLET ORAL at 08:06

## 2018-06-16 RX ADMIN — IPRATROPIUM BROMIDE AND ALBUTEROL SULFATE 3 ML: .5; 3 SOLUTION RESPIRATORY (INHALATION) at 06:06

## 2018-06-16 RX ADMIN — DONEPEZIL HYDROCHLORIDE 10 MG: 5 TABLET, FILM COATED ORAL at 08:06

## 2018-06-16 RX ADMIN — LAMOTRIGINE 25 MG: 25 TABLET ORAL at 08:06

## 2018-06-16 RX ADMIN — PIPERACILLIN AND TAZOBACTAM 3.38 G: 3; .375 INJECTION, POWDER, LYOPHILIZED, FOR SOLUTION INTRAVENOUS; PARENTERAL at 01:06

## 2018-06-16 RX ADMIN — LAMOTRIGINE 25 MG: 25 TABLET ORAL at 09:06

## 2018-06-16 RX ADMIN — FOLIC ACID 1 MG: 1 TABLET ORAL at 09:06

## 2018-06-16 RX ADMIN — ENOXAPARIN SODIUM 40 MG: 100 INJECTION SUBCUTANEOUS at 04:06

## 2018-06-16 RX ADMIN — METOPROLOL TARTRATE 5 MG: 5 INJECTION, SOLUTION INTRAVENOUS at 03:06

## 2018-06-16 RX ADMIN — MEMANTINE HYDROCHLORIDE 5 MG: 5 TABLET ORAL at 09:06

## 2018-06-16 RX ADMIN — NYSTATIN 500000 UNITS: 100000 SUSPENSION ORAL at 08:06

## 2018-06-16 RX ADMIN — PIPERACILLIN AND TAZOBACTAM 3.38 G: 3; .375 INJECTION, POWDER, LYOPHILIZED, FOR SOLUTION INTRAVENOUS; PARENTERAL at 10:06

## 2018-06-16 RX ADMIN — PANTOPRAZOLE SODIUM 40 MG: 40 INJECTION, POWDER, LYOPHILIZED, FOR SOLUTION INTRAVENOUS at 09:06

## 2018-06-16 RX ADMIN — NYSTATIN 500000 UNITS: 100000 SUSPENSION ORAL at 03:06

## 2018-06-16 RX ADMIN — PIPERACILLIN AND TAZOBACTAM 3.38 G: 3; .375 INJECTION, POWDER, LYOPHILIZED, FOR SOLUTION INTRAVENOUS; PARENTERAL at 04:06

## 2018-06-16 RX ADMIN — QUETIAPINE FUMARATE 25 MG: 25 TABLET ORAL at 08:06

## 2018-06-16 RX ADMIN — AMLODIPINE BESYLATE 10 MG: 5 TABLET ORAL at 09:06

## 2018-06-16 RX ADMIN — NYSTATIN 500000 UNITS: 100000 SUSPENSION ORAL at 04:06

## 2018-06-16 RX ADMIN — METOPROLOL TARTRATE 5 MG: 5 INJECTION, SOLUTION INTRAVENOUS at 01:06

## 2018-06-16 NOTE — ASSESSMENT & PLAN NOTE
Had hyponatremia at admission.  This resolved with fluid resuscitation  Chloride also elevated, thus this may be from NaCl resucitation  Will start 1/2 NS today and trend--improving

## 2018-06-16 NOTE — PROGRESS NOTES
[facility] Medicine  Progress Note    Patient Name: Angel Pineda  MRN: [id number]  Patient Class: IP- Inpatient   Admission Date: 6/7/2018  Length of Stay: 8 days  Attending Physician: Irais Spears MD  Primary Care Provider: Primary Doctor No        Subjective:     Principal Problem:Acute respiratory failure with hypoxia and hypercapnia    HPI:  Patient is a 73 y.o. male admitted to Hospitalist Service from [facility] with complaint of SOB. Patient reportedly has past medical history significant for hypertension and dementia. Patient is a resident of [facility]. Further detail of HPI are not available. Patient's son is at bedside who is POA. He reports, patient had some diarrhea for 1-2 days but no fever. He was started on some antibiotics 2 days ago    Hospital Course:  No notes on file    Interval History: pt seen/examined-alert, ngtube in   He is unable to converse due to dementia  JOSÉ MIGUEL Abdul has discussed plan of care with son-no feeding tube and patient has failed swallow study 3x  So will continue to aspirate and progress with respiratory failure    pt to be dc to [facility] but they cannot accept a new admit over the weekend  [place] [facility] [place] [facility] [place] can no longer meet patient needs     Review of Systems   Unable to perform ROS: Dementia     Objective:     Vital Signs (Most Recent):  Temp: 99 °F (37.2 °C) (06/15/18 2000)  Pulse: 94 (06/15/18 2245)  Resp: (!) 22 (06/15/18 2245)  BP: (!) 141/64 (06/15/18 2245)  SpO2: 98 % (06/15/18 2245) Vital Signs (24h Range):  Temp:  [99 °F (37.2 °C)-100 °F (37.8 °C)] 99 °F (37.2 °C)  Pulse:  [] 94  Resp:  [0-38] 22  SpO2:  [92 %-100 %] 98 %  BP: (114-176)/(57-79) 141/64     Weight: 105.1 kg (231 lb 11.3 oz)  Body mass index is 30.57 kg/m².    Intake/Output Summary (Last 24 hours) at 06/15/18 2313  Last data filed at 06/15/18 2000   Gross per 24 hour    Intake             2650 ml   Output             1400 ml   Net             1250 ml      Physical Exam   Constitutional: He appears well-developed and well-nourished. He appears distressed.   Obese wm, ngube right nares    HENT:   Head: Normocephalic and atraumatic.   Right Ear: External ear normal.   Left Ear: External ear normal.   Mouth/Throat: No oropharyngeal exudate.   Eyes: Conjunctivae are normal. Right eye exhibits no discharge. Left eye exhibits no discharge.   Neck: Normal range of motion. Neck supple. No tracheal deviation present. No thyromegaly present.   Cardiovascular: Normal rate, regular rhythm, normal heart sounds and intact distal pulses.  Exam reveals no gallop and no friction rub.    No murmur heard.  tachycardic   Pulmonary/Chest: He is in respiratory distress. He has wheezes. He has rales.   On bipap   Abdominal: Soft. Bowel sounds are normal. He exhibits no distension and no mass. There is no tenderness. There is no rebound and no guarding.   Genitourinary:   Genitourinary Comments: Mack in --clear urine   Musculoskeletal:   Bedbound    Neurological: He is alert.   Pleasant, confused   Skin: Skin is warm and dry. Capillary refill takes less than 2 seconds. He is not diaphoretic.   Psychiatric:   Confused, dementia    Nursing note and vitals reviewed.      Significant Labs:   BMP:   Recent Labs  Lab 06/15/18  1807   *   *   K 3.7      CO2 32*   BUN 29*   CREATININE 0.9   CALCIUM 9.0     CBC:   Recent Labs  Lab 06/14/18  0319 06/15/18  0610   WBC 11.60 12.40   HGB 12.6* 12.5*   HCT 38.1* 37.5*    245       Significant Imaging: bilateral infiltrates     Assessment/Plan:      * Acute respiratory failure with hypoxia and hypercapnia    Secondary to strep pneumonia, final cultures pending with strep  bacteremia  Supplemental oxygen per bipap  Continue current setting of bipap. Can go to 2hrs on 2 hours off or per pulmonary recommendations          Hypernatremia    Had  hyponatremia at admission.  This resolved with fluid resuscitation  Chloride also elevated, thus this may be from NaCl resucitation  Will start 1/2 NS today and trend--improving             Bacteremia    2/2 to strep pneumonia  Continue broad spectrum antibiotics until susceptibilities result  Will obtain echocardiogram to rule out endocarditis  Repeat blood cultures today to test for clearence        Dementia with behavioral disturbance    Dementia is uncontrolled. And progressive with dysphagia-causing respiratory failure  Continue home dementia meds and non-pharmacologic interventions to prevent delirium (No VS between 11PM-5AM, activity during day, opening blinds, providing glasses/hearing aids, and up in chair during daytime). Use PRN anti-psychotics to prevent behavior of self harm during sundowning, and avoid narcotics and benzos unless absolutely necessary. PRN anti-psychotics prescribed to avoid self harm behaviors.   continue Aricept and Namenda.  Requiring restraints due to interference of medical equipment as he is currently bipap dependent        COPD with acute lower respiratory infection    Continue bipap with breaks, will expect improvement while on antibiotic therapy  Will follow pulmonary recommendations  Scheduled breathing treatments        Hypertension    Hypertension Medications   Chronic-stable; monitoring in ICU  Medications held initial then resumed as appropriate             amlodipine (NORVASC) 10 MG tablet Take 10 mg by mouth once daily.    lisinopril (PRINIVIL,ZESTRIL) 40 MG tablet Take 40 mg by mouth once daily.    metoprolol tartrate (LOPRESSOR) 25 MG tablet Take 25 mg by mouth 2 (two) times daily.      Hospital Medications             amLODIPine tablet 10 mg Take 2 tablets (10 mg total) by mouth once daily.    hydrALAZINE injection 20 mg Inject 1 mL (20 mg total) into the vein every 6 (six) hours as needed for SBP greater than (170).    labetalol injection 20 mg Inject 4 mLs (20 mg  total) into the vein every 2 (two) hours as needed for SBP greater than (SBP > 160).    lisinopril tablet 40 mg Take 1 tablet (40 mg total) by mouth once daily.    metoprolol injection 5 mg Inject 5 mLs (5 mg total) into the vein every 6 (six) hours.              ANÍBAL (acute kidney injury)    Resolved   Continue IVF hydration. Follow BMP.---improving        Malnutrition of moderate degree    If patient again fails swallow study will need to start D5 or place NGT for tube feeds  Will follow blood sugars closely        Pneumonia of right lower lobe due to infectious organism    2/2 to strep pneumonia--susceptibilities s/rocephin/pcn/vanco  As seen on xray and leukocytosis  Continue antibiotics,-x 10 days-  Scheduled breathing treatements  Oxygent to keep sats >92%  Pt to dc with  Hospice 2 days            VTE Risk Mitigation         Ordered     enoxaparin injection 40 mg  Daily      06/07/18 1720     IP VTE HIGH RISK PATIENT  Once      06/07/18 1720     Place AME hose  Until discontinued      06/07/18 1720     Place sequential compression device  Until discontinued      06/07/18 1720          Critical care time spent on the evaluation and treatment of severe organ dysfunction, review of pertinent labs and imaging studies, discussions with-- consulting providers and discussions with patient/family: -and Case management/nursing  35 min  slade Spears MD  Department of Hospital Medicine   [facility]

## 2018-06-16 NOTE — PROGRESS NOTES
Progress Note  Pulmonary/Critical Care Medicine    Admit Date: 6/7/2018    SUBJECTIVE:     Follow-up For:  Aspiration pneumonia and pleural effusion    Review of Systems:  Review of systems not obtained due to patient factors patient is delirious.    OBJECTIVE:   The patient continues to perseverate on words. At this moment he is sleeping.According to the chart, he is supposed to transfer to [facility] Monday for hospice. It is not clear what the sons plans are insofar is nutrition going forward he is currently being fed via enteral tube feeding.  Vital Signs (Most Recent)  Temp: 97.6 °F (36.4 °C) (06/16/18 0800)  Pulse: 87 (06/16/18 0800)  Resp: 20 (06/16/18 0800)  BP: (!) 148/68 (06/16/18 0800)  SpO2: 97 % (06/16/18 0800)    I & O (Last 24H):    Intake/Output Summary (Last 24 hours) at 06/16/18 0830  Last data filed at 06/16/18 0618   Gross per 24 hour   Intake             1450 ml   Output             1700 ml   Net             -250 ml       Physical Exam:  General: well developed, well nourished  HENT: Head:normocephalic, atraumatic.   Ears:right ear normal, left ear normal.   Nose: with NG tube   Throat: lips, mucosa, and tongue normal  Eyes: conjunctivae/corneas clear. PERRL.   Lungs:  clear to auscultation bilaterally and normal respiratory effort  Cardiovascular: Heart: regular rate and rhythm, S1, S2 normal, no murmur,   Chest Wall: no tenderness.   Extremities: no cyanosis or edema, or clubbing.   Pulses: 2+ and symmetric.  Abdomen: soft, non-tender non-distented; bowel sounds normal; no masses,  no organomegaly.   Skin: Skin color, texture, turgor normal. No rashes or lesions  Musculoskeletal:no clubbing, cyanosis  Neurologic: sleeping  Psych/Behavioral:  Perseverating when awake    Laboratory:  CBC:    Recent Labs  Lab 06/16/18  0537   WBC 10.60   RBC 3.96*   HGB 12.1*   HCT 36.6*      MCV 92   MCH 30.6   MCHC 33.2     CMP    Recent Labs  Lab 06/16/18  0537   CALCIUM 8.7  8.7   ALBUMIN 1.9*    PROT 6.2     144   K 3.6  3.6   CO2 32*  32*     107   BUN 25*  25*   CREATININE 0.8  0.8   ALKPHOS 66   ALT 64*   AST 43*   BILITOT 0.4         Diagnostic Results:  Labs: Reviewed  X-Ray: Reviewed    ASSESSMENT/PLAN:   Aspiration pneumonia and pleural effusion  Severe malnutrition  Mild transaminitis  Anemia of chronic disease      Plans for hospice on Monday noted  Continue oxygen

## 2018-06-16 NOTE — ASSESSMENT & PLAN NOTE
Hypertension Medications   Chronic-stable; monitoring in ICU  Medications held initial then resumed as appropriate             amlodipine (NORVASC) 10 MG tablet Take 10 mg by mouth once daily.    lisinopril (PRINIVIL,ZESTRIL) 40 MG tablet Take 40 mg by mouth once daily.    metoprolol tartrate (LOPRESSOR) 25 MG tablet Take 25 mg by mouth 2 (two) times daily.      Hospital Medications             amLODIPine tablet 10 mg Take 2 tablets (10 mg total) by mouth once daily.    hydrALAZINE injection 20 mg Inject 1 mL (20 mg total) into the vein every 6 (six) hours as needed for SBP greater than (170).    labetalol injection 20 mg Inject 4 mLs (20 mg total) into the vein every 2 (two) hours as needed for SBP greater than (SBP > 160).    lisinopril tablet 40 mg Take 1 tablet (40 mg total) by mouth once daily.    metoprolol injection 5 mg Inject 5 mLs (5 mg total) into the vein every 6 (six) hours.

## 2018-06-16 NOTE — ASSESSMENT & PLAN NOTE
Secondary to strep pneumonia, final cultures pending with strep  bacteremia  Supplemental oxygen per bipap  Continue current setting of bipap. Can go to 2hrs on 2 hours off or per pulmonary recommendations

## 2018-06-16 NOTE — ASSESSMENT & PLAN NOTE
Dementia is uncontrolled. Dc home with hospice      And progressive with dysphagia-causing respiratory failure  Continue home dementia meds and non-pharmacologic interventions to prevent delirium (No VS between 11PM-5AM, activity during day, opening blinds, providing glasses/hearing aids, and up in chair during daytime). Use PRN anti-psychotics to prevent behavior of self harm during sundowning, and avoid narcotics and benzos unless absolutely necessary. PRN anti-psychotics prescribed to avoid self harm behaviors.   continue Aricept and Namenda.  Requiring restraints due to interference of medical equipment as he is currently bipap dependent

## 2018-06-16 NOTE — ASSESSMENT & PLAN NOTE
Had hyponatremia at admission.  This resolved with fluid resuscitation  Chloride also elevated, thus this may be from NaCl resucitation

## 2018-06-16 NOTE — ASSESSMENT & PLAN NOTE
Continue bipap with breaks, will expect improvement while on antibiotic therapy  Will follow pulmonary recommendations  Scheduled breathing treatments  See pneumonia

## 2018-06-16 NOTE — ASSESSMENT & PLAN NOTE
2/2 to strep pneumonia--susceptibilities s/rocephin/pcn/vanco  As seen on xray and leukocytosis  Continue antibiotics,-x 10 days-  Scheduled breathing treatements  Oxygent to keep sats >92%  Pt to dc with  Hospice 2 days

## 2018-06-16 NOTE — PLAN OF CARE
Problem: Patient Care Overview  Goal: Plan of Care Review  Outcome: Ongoing (interventions implemented as appropriate)  Patient receiving aerosol tx via duoneb now and q6hr.  Hr 80 and 02 saturation 97% on nc at 3lpm

## 2018-06-16 NOTE — PROGRESS NOTES
[facility] Medicine  Progress Note    Patient Name: Angel Pineda  MRN: [id number]  Patient Class: IP- Inpatient   Admission Date: 6/7/2018  Length of Stay: 9 days  Attending Physician: Irais Spears MD  Primary Care Provider: Primary Doctor No        Subjective:     Principal Problem:Acute respiratory failure with hypoxia and hypercapnia    HPI:  Patient is a 73 y.o. male admitted to Hospitalist Service from [facility] with complaint of SOB. Patient reportedly has past medical history significant for hypertension and dementia. Patient is a resident of [facility]. Further detail of HPI are not available. Patient's son is at bedside who is POA. He reports, patient had some diarrhea for 1-2 days but no fever. He was started on some antibiotics 2 days ago    Hospital Course:  No notes on file    Interval History: pt seen/examined; ngtube in ; says he wants to get up   Nurse reports he just keeps asking for red bull and is requiring restraints     Review of Systems   Unable to perform ROS: Dementia     Objective:     Vital Signs (Most Recent):  Temp: 98.3 °F (36.8 °C) (06/16/18 1600)  Pulse: 86 (06/16/18 1630)  Resp: (!) 23 (06/16/18 1630)  BP: (!) 159/71 (06/16/18 1630)  SpO2: 100 % (06/16/18 1630) Vital Signs (24h Range):  Temp:  [97.6 °F (36.4 °C)-99 °F (37.2 °C)] 98.3 °F (36.8 °C)  Pulse:  [80-97] 86  Resp:  [0-32] 23  SpO2:  [89 %-100 %] 100 %  BP: (128-170)/(63-82) 159/71     Weight: 105.1 kg (231 lb 11.3 oz)  Body mass index is 30.57 kg/m².    Intake/Output Summary (Last 24 hours) at 06/16/18 1710  Last data filed at 06/16/18 1657   Gross per 24 hour   Intake             1650 ml   Output             2400 ml   Net             -750 ml      Physical Exam   Constitutional: He appears well-developed and well-nourished. He appears distressed.   Obese wm, ngube right nares    HENT:   Head: Normocephalic and atraumatic.   Right Ear: External ear normal.    Left Ear: External ear normal.   Mouth/Throat: No oropharyngeal exudate.   Eyes: Conjunctivae are normal. Right eye exhibits no discharge. Left eye exhibits no discharge.   Neck: Normal range of motion. Neck supple. No tracheal deviation present. No thyromegaly present.   Cardiovascular: Normal rate, regular rhythm, normal heart sounds and intact distal pulses.  Exam reveals no gallop and no friction rub.    No murmur heard.  tachycardic   Pulmonary/Chest: Effort normal. He has decreased breath sounds in the right lower field and the left lower field. He has no wheezes. He has rhonchi (diffuse ins/exp ). He has rales.   Abdominal: Soft. Bowel sounds are normal. He exhibits no distension and no mass. There is no tenderness. There is no rebound and no guarding.   Genitourinary:   Genitourinary Comments: Mack in --clear urine  Excoriation on scrotum    Musculoskeletal: He exhibits no edema.   Bedbound    Neurological: He is alert.   Pleasant, confused   Skin: Skin is warm and dry. Capillary refill takes less than 2 seconds. He is not diaphoretic.   Psychiatric:   Confused, dementia    Nursing note and vitals reviewed.      Significant Labs:   CBC:   Recent Labs  Lab 06/15/18  0610 06/16/18  0537   WBC 12.40 10.60   HGB 12.5* 12.1*   HCT 37.5* 36.6*    253     CMP:   Recent Labs  Lab 06/15/18  0610  06/15/18  2343 06/16/18  0537 06/16/18  1118   *  149*  < > 147* 144  144 144   K 4.0  4.0  < > 3.4* 3.6  3.6 3.6     110  < > 108 107  107 106   CO2 33*  33*  < > 33* 32*  32* 31*   *  199*  < > 186* 194*  194* 174*   BUN 29*  29*  < > 27* 25*  25* 24*   CREATININE 1.0  1.0  < > 0.9 0.8  0.8 0.7   CALCIUM 9.0  9.0  < > 8.9 8.7  8.7 8.9   PROT 6.4  --   --  6.2  --    ALBUMIN 2.0*  --   --  1.9*  --    BILITOT 0.5  --   --  0.4  --    ALKPHOS 68  --   --  66  --    AST 53*  --   --  43*  --    ALT 68*  --   --  64*  --    ANIONGAP 6*  6*  < > 6* 5*  5* 7*   EGFRNONAA >60  >60   < > >60 >60  >60 >60   < > = values in this interval not displayed.    Significant Imaging: na    Assessment/Plan:      * Acute respiratory failure with hypoxia and hypercapnia    Secondary to strep pneumonia, final cultures pending with strep  bacteremia  Supplemental oxygen per bipap  Continue current setting of bipap. Can go to 2hrs on 2 hours off or per pulmonary recommendations          Hypernatremia    Had hyponatremia at admission.  This resolved with fluid resuscitation  Chloride also elevated, thus this may be from NaCl resucitation              Bacteremia    2/2 to strep pneumonia  Continue broad spectrum antibiotics until susceptibilities result  Will obtain echocardiogram to rule out endocarditis  Repeat blood cultures today to test for clearence        Dementia with behavioral disturbance    Dementia is uncontrolled. Dc home with hospice      And progressive with dysphagia-causing respiratory failure  Continue home dementia meds and non-pharmacologic interventions to prevent delirium (No VS between 11PM-5AM, activity during day, opening blinds, providing glasses/hearing aids, and up in chair during daytime). Use PRN anti-psychotics to prevent behavior of self harm during sundowning, and avoid narcotics and benzos unless absolutely necessary. PRN anti-psychotics prescribed to avoid self harm behaviors.   continue Aricept and Namenda.  Requiring restraints due to interference of medical equipment as he is currently bipap dependent        COPD with acute lower respiratory infection    Continue bipap with breaks, will expect improvement while on antibiotic therapy  Will follow pulmonary recommendations  Scheduled breathing treatments  See pneumonia        Hypertension    Hypertension Medications   Chronic-stable; monitoring in ICU  Medications held initial then resumed as appropriate             amlodipine (NORVASC) 10 MG tablet Take 10 mg by mouth once daily.    lisinopril (PRINIVIL,ZESTRIL) 40 MG tablet  Take 40 mg by mouth once daily.    metoprolol tartrate (LOPRESSOR) 25 MG tablet Take 25 mg by mouth 2 (two) times daily.      Hospital Medications             amLODIPine tablet 10 mg Take 2 tablets (10 mg total) by mouth once daily.    hydrALAZINE injection 20 mg Inject 1 mL (20 mg total) into the vein every 6 (six) hours as needed for SBP greater than (170).    labetalol injection 20 mg Inject 4 mLs (20 mg total) into the vein every 2 (two) hours as needed for SBP greater than (SBP > 160).    lisinopril tablet 40 mg Take 1 tablet (40 mg total) by mouth once daily.    metoprolol injection 5 mg Inject 5 mLs (5 mg total) into the vein every 6 (six) hours.              NAÍBAL (acute kidney injury)    Resolved   Transition VF hydration. To free water in ngtube         Malnutrition of moderate degree    If patient again fails swallow study will need to start D5 or place NGT for tube feeds  Will follow blood sugars closely        Pneumonia of right lower lobe due to infectious organism    2/2 to strep pneumonia--susceptibilities s/rocephin/pcn/vanco  As seen on xray and leukocytosis  Continue antibiotics,-x 10 days--to complete 6/17     Scheduled breathing treatements  Oxygent to keep sats >92%  Pt to dc with  Hospice 2 days            VTE Risk Mitigation         Ordered     enoxaparin injection 40 mg  Daily      06/07/18 1720     IP VTE HIGH RISK PATIENT  Once      06/07/18 1720     Place AME hose  Until discontinued      06/07/18 1720     Place sequential compression device  Until discontinued      06/07/18 1720          .    Irais Spears MD  Department of Hospital Medicine   [facility]

## 2018-06-16 NOTE — ASSESSMENT & PLAN NOTE
Dementia is uncontrolled. And progressive with dysphagia-causing respiratory failure  Continue home dementia meds and non-pharmacologic interventions to prevent delirium (No VS between 11PM-5AM, activity during day, opening blinds, providing glasses/hearing aids, and up in chair during daytime). Use PRN anti-psychotics to prevent behavior of self harm during sundowning, and avoid narcotics and benzos unless absolutely necessary. PRN anti-psychotics prescribed to avoid self harm behaviors.   continue Aricept and Namenda.  Requiring restraints due to interference of medical equipment as he is currently bipap dependent

## 2018-06-16 NOTE — SUBJECTIVE & OBJECTIVE
Interval History: pt seen/examined; ngtube in ; says he wants to get up   Nurse reports he just keeps asking for red bull and is requiring restraints     Review of Systems   Unable to perform ROS: Dementia     Objective:     Vital Signs (Most Recent):  Temp: 98.3 °F (36.8 °C) (06/16/18 1600)  Pulse: 86 (06/16/18 1630)  Resp: (!) 23 (06/16/18 1630)  BP: (!) 159/71 (06/16/18 1630)  SpO2: 100 % (06/16/18 1630) Vital Signs (24h Range):  Temp:  [97.6 °F (36.4 °C)-99 °F (37.2 °C)] 98.3 °F (36.8 °C)  Pulse:  [80-97] 86  Resp:  [0-32] 23  SpO2:  [89 %-100 %] 100 %  BP: (128-170)/(63-82) 159/71     Weight: 105.1 kg (231 lb 11.3 oz)  Body mass index is 30.57 kg/m².    Intake/Output Summary (Last 24 hours) at 06/16/18 1710  Last data filed at 06/16/18 1657   Gross per 24 hour   Intake             1650 ml   Output             2400 ml   Net             -750 ml      Physical Exam   Constitutional: He appears well-developed and well-nourished. He appears distressed.   Obese wm, ngube right nares    HENT:   Head: Normocephalic and atraumatic.   Right Ear: External ear normal.   Left Ear: External ear normal.   Mouth/Throat: No oropharyngeal exudate.   Eyes: Conjunctivae are normal. Right eye exhibits no discharge. Left eye exhibits no discharge.   Neck: Normal range of motion. Neck supple. No tracheal deviation present. No thyromegaly present.   Cardiovascular: Normal rate, regular rhythm, normal heart sounds and intact distal pulses.  Exam reveals no gallop and no friction rub.    No murmur heard.  tachycardic   Pulmonary/Chest: Effort normal. He has decreased breath sounds in the right lower field and the left lower field. He has no wheezes. He has rhonchi (diffuse ins/exp ). He has rales.   Abdominal: Soft. Bowel sounds are normal. He exhibits no distension and no mass. There is no tenderness. There is no rebound and no guarding.   Genitourinary:   Genitourinary Comments: Mack in --clear urine  Excoriation on scrotum     Musculoskeletal: He exhibits no edema.   Bedbound    Neurological: He is alert.   Pleasant, confused   Skin: Skin is warm and dry. Capillary refill takes less than 2 seconds. He is not diaphoretic.   Psychiatric:   Confused, dementia    Nursing note and vitals reviewed.      Significant Labs:   CBC:   Recent Labs  Lab 06/15/18  0610 06/16/18  0537   WBC 12.40 10.60   HGB 12.5* 12.1*   HCT 37.5* 36.6*    253     CMP:   Recent Labs  Lab 06/15/18  0610  06/15/18  2343 06/16/18  0537 06/16/18  1118   *  149*  < > 147* 144  144 144   K 4.0  4.0  < > 3.4* 3.6  3.6 3.6     110  < > 108 107  107 106   CO2 33*  33*  < > 33* 32*  32* 31*   *  199*  < > 186* 194*  194* 174*   BUN 29*  29*  < > 27* 25*  25* 24*   CREATININE 1.0  1.0  < > 0.9 0.8  0.8 0.7   CALCIUM 9.0  9.0  < > 8.9 8.7  8.7 8.9   PROT 6.4  --   --  6.2  --    ALBUMIN 2.0*  --   --  1.9*  --    BILITOT 0.5  --   --  0.4  --    ALKPHOS 68  --   --  66  --    AST 53*  --   --  43*  --    ALT 68*  --   --  64*  --    ANIONGAP 6*  6*  < > 6* 5*  5* 7*   EGFRNONAA >60  >60  < > >60 >60  >60 >60   < > = values in this interval not displayed.    Significant Imaging: na

## 2018-06-16 NOTE — ASSESSMENT & PLAN NOTE
2/2 to strep pneumonia--susceptibilities s/rocephin/pcn/vanco  As seen on xray and leukocytosis  Continue antibiotics,-x 10 days--to complete 6/17     Scheduled breathing treatements  Oxygent to keep sats >92%  Pt to dc with  Hospice 2 days

## 2018-06-16 NOTE — SUBJECTIVE & OBJECTIVE
Interval History: pt seen/examined-alert, ngtube in   He is unable to converse due to dementia  CM Maura Abdul has discussed plan of care with son-no feeding tube and patient has failed swallow study 3x  So will continue to aspirate and progress with respiratory failure    pt to be dc to [facility] but they cannot accept a new admit over the weekend  [place] [facility] can no longer meet patient needs     Review of Systems   Unable to perform ROS: Dementia     Objective:     Vital Signs (Most Recent):  Temp: 99 °F (37.2 °C) (06/15/18 2000)  Pulse: 94 (06/15/18 2245)  Resp: (!) 22 (06/15/18 2245)  BP: (!) 141/64 (06/15/18 2245)  SpO2: 98 % (06/15/18 2245) Vital Signs (24h Range):  Temp:  [99 °F (37.2 °C)-100 °F (37.8 °C)] 99 °F (37.2 °C)  Pulse:  [] 94  Resp:  [0-38] 22  SpO2:  [92 %-100 %] 98 %  BP: (114-176)/(57-79) 141/64     Weight: 105.1 kg (231 lb 11.3 oz)  Body mass index is 30.57 kg/m².    Intake/Output Summary (Last 24 hours) at 06/15/18 2313  Last data filed at 06/15/18 2000   Gross per 24 hour   Intake             2650 ml   Output             1400 ml   Net             1250 ml      Physical Exam   Constitutional: He appears well-developed and well-nourished. He appears distressed.   Obese wm, ngube right nares    HENT:   Head: Normocephalic and atraumatic.   Right Ear: External ear normal.   Left Ear: External ear normal.   Mouth/Throat: No oropharyngeal exudate.   Eyes: Conjunctivae are normal. Right eye exhibits no discharge. Left eye exhibits no discharge.   Neck: Normal range of motion. Neck supple. No tracheal deviation present. No thyromegaly present.   Cardiovascular: Normal rate, regular rhythm, normal heart sounds and intact distal pulses.  Exam reveals no gallop and no friction rub.    No murmur heard.  tachycardic   Pulmonary/Chest: He is in respiratory distress. He has wheezes. He has rales.   On bipap   Abdominal: Soft. Bowel sounds are normal.  He exhibits no distension and no mass. There is no tenderness. There is no rebound and no guarding.   Genitourinary:   Genitourinary Comments: Mack in --clear urine   Musculoskeletal:   Bedbound    Neurological: He is alert.   Pleasant, confused   Skin: Skin is warm and dry. Capillary refill takes less than 2 seconds. He is not diaphoretic.   Psychiatric:   Confused, dementia    Nursing note and vitals reviewed.      Significant Labs:   BMP:   Recent Labs  Lab 06/15/18  1807   *   *   K 3.7      CO2 32*   BUN 29*   CREATININE 0.9   CALCIUM 9.0     CBC:   Recent Labs  Lab 06/14/18  0319 06/15/18  0610   WBC 11.60 12.40   HGB 12.6* 12.5*   HCT 38.1* 37.5*    245       Significant Imaging: bilateral infiltrates

## 2018-06-16 NOTE — PLAN OF CARE
06/15/18 1930   Patient Assessment/Suction   Level of Consciousness (AVPU) alert   Respiratory Effort Normal;Unlabored   Expansion/Accessory Muscles/Retractions abdominal muscle use   All Lung Fields Breath Sounds Anterior:;rhonchi   PRE-TX-O2-ETCO2   O2 Device (Oxygen Therapy) nasal cannula   $ Is the patient on Low Flow Oxygen? Yes   Flow (L/min) 3   SpO2 96 %   Pulse Oximetry Type Continuous   $ Pulse Oximetry - Multiple Charge Pulse Oximetry - Multiple   Pulse 83   Resp 18   Aerosol Therapy   $ Aerosol Therapy Charges Aerosol Treatment   Respiratory Treatment Status given   SVN/Inhaler Treatment Route mask   Position During Treatment HOB at 45 degrees   Patient Tolerance good   Post-Treatment   Post-treatment Heart Rate (beats/min) 90   Post-treatment Resp Rate (breaths/min) 20   All Fields Breath Sounds clear

## 2018-06-16 NOTE — PLAN OF CARE
Problem: Patient Care Overview  Goal: Plan of Care Review  Outcome: Ongoing (interventions implemented as appropriate)  Pt still needing restraints, unable to answer questions and follow instructions appropriately.  Tolerating tube feeds, having loose stools.  Plans for placement in memory unit at [place] early part of next week.

## 2018-06-17 PROCEDURE — 20000000 HC ICU ROOM

## 2018-06-17 PROCEDURE — 25000003 PHARM REV CODE 250: Performed by: NURSE PRACTITIONER

## 2018-06-17 PROCEDURE — 63600175 PHARM REV CODE 636 W HCPCS: Performed by: HOSPITALIST

## 2018-06-17 PROCEDURE — C9113 INJ PANTOPRAZOLE SODIUM, VIA: HCPCS | Performed by: INTERNAL MEDICINE

## 2018-06-17 PROCEDURE — 25000003 PHARM REV CODE 250: Performed by: HOSPITALIST

## 2018-06-17 PROCEDURE — 94761 N-INVAS EAR/PLS OXIMETRY MLT: CPT

## 2018-06-17 PROCEDURE — 25000003 PHARM REV CODE 250: Performed by: INTERNAL MEDICINE

## 2018-06-17 PROCEDURE — 63600175 PHARM REV CODE 636 W HCPCS: Performed by: INTERNAL MEDICINE

## 2018-06-17 PROCEDURE — 94640 AIRWAY INHALATION TREATMENT: CPT

## 2018-06-17 PROCEDURE — 27000221 HC OXYGEN, UP TO 24 HOURS

## 2018-06-17 PROCEDURE — 25000242 PHARM REV CODE 250 ALT 637 W/ HCPCS: Performed by: INTERNAL MEDICINE

## 2018-06-17 PROCEDURE — 99900035 HC TECH TIME PER 15 MIN (STAT)

## 2018-06-17 RX ADMIN — NYSTATIN 500000 UNITS: 100000 SUSPENSION ORAL at 09:06

## 2018-06-17 RX ADMIN — NYSTATIN 500000 UNITS: 100000 SUSPENSION ORAL at 12:06

## 2018-06-17 RX ADMIN — QUETIAPINE FUMARATE 25 MG: 25 TABLET ORAL at 10:06

## 2018-06-17 RX ADMIN — NYSTATIN 500000 UNITS: 100000 SUSPENSION ORAL at 05:06

## 2018-06-17 RX ADMIN — METOPROLOL TARTRATE 5 MG: 5 INJECTION, SOLUTION INTRAVENOUS at 05:06

## 2018-06-17 RX ADMIN — NYSTATIN 500000 UNITS: 100000 SUSPENSION ORAL at 10:06

## 2018-06-17 RX ADMIN — IPRATROPIUM BROMIDE AND ALBUTEROL SULFATE 3 ML: .5; 3 SOLUTION RESPIRATORY (INHALATION) at 12:06

## 2018-06-17 RX ADMIN — METOPROLOL TARTRATE 5 MG: 5 INJECTION, SOLUTION INTRAVENOUS at 11:06

## 2018-06-17 RX ADMIN — IPRATROPIUM BROMIDE AND ALBUTEROL SULFATE 3 ML: .5; 3 SOLUTION RESPIRATORY (INHALATION) at 07:06

## 2018-06-17 RX ADMIN — HALOPERIDOL LACTATE 2.5 MG: 5 INJECTION, SOLUTION INTRAMUSCULAR at 01:06

## 2018-06-17 RX ADMIN — MEMANTINE HYDROCHLORIDE 5 MG: 5 TABLET ORAL at 09:06

## 2018-06-17 RX ADMIN — FOLIC ACID 1 MG: 1 TABLET ORAL at 09:06

## 2018-06-17 RX ADMIN — METOPROLOL TARTRATE 5 MG: 5 INJECTION, SOLUTION INTRAVENOUS at 06:06

## 2018-06-17 RX ADMIN — PIPERACILLIN AND TAZOBACTAM 3.38 G: 3; .375 INJECTION, POWDER, LYOPHILIZED, FOR SOLUTION INTRAVENOUS; PARENTERAL at 01:06

## 2018-06-17 RX ADMIN — PANTOPRAZOLE SODIUM 40 MG: 40 INJECTION, POWDER, LYOPHILIZED, FOR SOLUTION INTRAVENOUS at 09:06

## 2018-06-17 RX ADMIN — IPRATROPIUM BROMIDE AND ALBUTEROL SULFATE 3 ML: .5; 3 SOLUTION RESPIRATORY (INHALATION) at 01:06

## 2018-06-17 RX ADMIN — PIPERACILLIN AND TAZOBACTAM 3.38 G: 3; .375 INJECTION, POWDER, LYOPHILIZED, FOR SOLUTION INTRAVENOUS; PARENTERAL at 05:06

## 2018-06-17 RX ADMIN — LAMOTRIGINE 25 MG: 25 TABLET ORAL at 09:06

## 2018-06-17 RX ADMIN — MEMANTINE HYDROCHLORIDE 5 MG: 5 TABLET ORAL at 10:06

## 2018-06-17 RX ADMIN — METOPROLOL TARTRATE 5 MG: 5 INJECTION, SOLUTION INTRAVENOUS at 12:06

## 2018-06-17 RX ADMIN — LISINOPRIL 40 MG: 40 TABLET ORAL at 09:06

## 2018-06-17 RX ADMIN — LAMOTRIGINE 25 MG: 25 TABLET ORAL at 10:06

## 2018-06-17 RX ADMIN — PIPERACILLIN AND TAZOBACTAM 3.38 G: 3; .375 INJECTION, POWDER, LYOPHILIZED, FOR SOLUTION INTRAVENOUS; PARENTERAL at 09:06

## 2018-06-17 RX ADMIN — ENOXAPARIN SODIUM 40 MG: 100 INJECTION SUBCUTANEOUS at 05:06

## 2018-06-17 RX ADMIN — DONEPEZIL HYDROCHLORIDE 10 MG: 5 TABLET, FILM COATED ORAL at 10:06

## 2018-06-17 RX ADMIN — AMLODIPINE BESYLATE 10 MG: 5 TABLET ORAL at 09:06

## 2018-06-17 NOTE — PROGRESS NOTES
06/16/18 1914   Patient Assessment/Suction   Level of Consciousness (AVPU) alert   Respiratory Effort Unlabored   All Lung Fields Breath Sounds coarse   PRE-TX-O2-ETCO2   O2 Device (Oxygen Therapy) nasal cannula w/ humidification   $ Is the patient on Low Flow Oxygen? Yes   Flow (L/min) 3   SpO2 (!) 94 %   Pulse Oximetry Type Continuous   $ Pulse Oximetry - Multiple Charge Pulse Oximetry - Multiple   Pulse 81   Resp (!) 21   Aerosol Therapy   $ Aerosol Therapy Charges Aerosol Treatment   Respiratory Treatment Status given   SVN/Inhaler Treatment Route with oxygen;mask   Position During Treatment HOB at 45 degrees   Patient Tolerance good   Post-Treatment   Post-treatment Heart Rate (beats/min) 81   Post-treatment Resp Rate (breaths/min) 21   All Fields Breath Sounds unchanged   Preset CPAP/BiPAP Settings   Mode Of Delivery Standby   $ Initial CPAP/BiPAP Setup? No   $ Is patient using? No/refused

## 2018-06-17 NOTE — ASSESSMENT & PLAN NOTE
Secondary to strep pneumonia, final cultures  with strep  bacteremia  Supplemental oxygen per bipap prn -was on 3 liter nc   Continue current setting of bipap.

## 2018-06-17 NOTE — SUBJECTIVE & OBJECTIVE
Interval History: alert, pleasant. Pt seen/examined; unable to have meaningful conversation but is pleasant and smiles. Occasional agitation and tries to get out of bed per nursing.   Spoke to son      Review of Systems   Unable to perform ROS: Dementia     Objective:     Vital Signs (Most Recent):  Temp: 98.3 °F (36.8 °C) (06/17/18 0800)  Pulse: 92 (06/17/18 0800)  Resp: (!) 33 (06/17/18 0800)  BP: (!) 169/71 (06/17/18 0800)  SpO2: (!) 91 % (06/17/18 0800) Vital Signs (24h Range):  Temp:  [97.9 °F (36.6 °C)-98.3 °F (36.8 °C)] 98.3 °F (36.8 °C)  Pulse:  [79-98] 92  Resp:  [15-35] 33  SpO2:  [86 %-100 %] 91 %  BP: (141-197)/(65-99) 169/71     Weight: 105.1 kg (231 lb 11.3 oz)  Body mass index is 30.57 kg/m².    Intake/Output Summary (Last 24 hours) at 06/17/18 1103  Last data filed at 06/17/18 1000   Gross per 24 hour   Intake             2610 ml   Output             2010 ml   Net              600 ml      Physical Exam   Constitutional: He appears well-developed and well-nourished. He appears distressed.   Obese wm, ngube right nares    HENT:   Head: Normocephalic and atraumatic.   Right Ear: External ear normal.   Left Ear: External ear normal.   Mouth/Throat: Oropharynx is clear and moist. No oropharyngeal exudate.   ngtube right nares    Eyes: Conjunctivae are normal. Right eye exhibits no discharge. Left eye exhibits no discharge.   Neck: Normal range of motion. Neck supple. No tracheal deviation present. No thyromegaly present.   Cardiovascular: Normal rate, regular rhythm, normal heart sounds and intact distal pulses.  Exam reveals no gallop and no friction rub.    No murmur heard.  tachycardic   Pulmonary/Chest: Effort normal. He has decreased breath sounds in the right lower field and the left lower field. He has no wheezes. He has rhonchi (diffuse ins/exp ). He has rales.   Abdominal: Soft. Bowel sounds are normal. He exhibits distension. He exhibits no mass. There is no tenderness. There is no rebound and  no guarding.   Genitourinary:   Genitourinary Comments: Mack in --clear urine  Excoriation on scrotum    Musculoskeletal: He exhibits no edema.   Bedbound    Neurological: He is alert.   Pleasant, confused   Skin: Skin is warm and dry. Capillary refill takes less than 2 seconds. He is not diaphoretic.   Psychiatric:   Confused, dementia    Nursing note and vitals reviewed.      Significant Labs:   BMP:   Recent Labs  Lab 18  1118   *      K 3.6      CO2 31*   BUN 24*   CREATININE 0.7   CALCIUM 8.9     CBC:   Recent Labs  Lab 18  0537   WBC 10.60   HGB 12.1*   HCT 36.6*          Significant Imagin/33-lau-vtmho infiltrates bases

## 2018-06-17 NOTE — PROGRESS NOTES
[facility] Hospital Medicine  Progress Note    Patient Name: Angel Pineda  MRN: [id number]  Patient Class: IP- Inpatient   Admission Date: 6/7/2018  Length of Stay: 10 days  Attending Physician: Irais Spears MD  Primary Care Provider: Primary Doctor No        Subjective:     Principal Problem:Acute respiratory failure with hypoxia and hypercapnia    HPI:  Patient is a 73 y.o. male admitted to Hospitalist Service from [facility] with complaint of SOB. Patient reportedly has past medical history significant for hypertension and dementia. Patient is a resident of [facility]. Further detail of HPI are not available. Patient's son is at bedside who is POA. He reports, patient had some diarrhea for 1-2 days but no fever. He was started on some antibiotics 2 days ago    Hospital Course:  No notes on file    Interval History: alert, pleasant. Pt seen/examined; unable to have meaningful conversation but is pleasant and smiles. Occasional agitation and tries to get out of bed per nursing.   Spoke to son      Review of Systems   Unable to perform ROS: Dementia     Objective:     Vital Signs (Most Recent):  Temp: 98.3 °F (36.8 °C) (06/17/18 0800)  Pulse: 92 (06/17/18 0800)  Resp: (!) 33 (06/17/18 0800)  BP: (!) 169/71 (06/17/18 0800)  SpO2: (!) 91 % (06/17/18 0800) Vital Signs (24h Range):  Temp:  [97.9 °F (36.6 °C)-98.3 °F (36.8 °C)] 98.3 °F (36.8 °C)  Pulse:  [79-98] 92  Resp:  [15-35] 33  SpO2:  [86 %-100 %] 91 %  BP: (141-197)/(65-99) 169/71     Weight: 105.1 kg (231 lb 11.3 oz)  Body mass index is 30.57 kg/m².    Intake/Output Summary (Last 24 hours) at 06/17/18 1103  Last data filed at 06/17/18 1000   Gross per 24 hour   Intake             2610 ml   Output             2010 ml   Net              600 ml      Physical Exam   Constitutional: He appears well-developed and well-nourished. He appears distressed.   Obese wm, ngube right nares    HENT:   Head:  Normocephalic and atraumatic.   Right Ear: External ear normal.   Left Ear: External ear normal.   Mouth/Throat: Oropharynx is clear and moist. No oropharyngeal exudate.   ngtube right nares    Eyes: Conjunctivae are normal. Right eye exhibits no discharge. Left eye exhibits no discharge.   Neck: Normal range of motion. Neck supple. No tracheal deviation present. No thyromegaly present.   Cardiovascular: Normal rate, regular rhythm, normal heart sounds and intact distal pulses.  Exam reveals no gallop and no friction rub.    No murmur heard.  tachycardic   Pulmonary/Chest: Effort normal. He has decreased breath sounds in the right lower field and the left lower field. He has no wheezes. He has rhonchi (diffuse ins/exp ). He has rales.   Abdominal: Soft. Bowel sounds are normal. He exhibits distension. He exhibits no mass. There is no tenderness. There is no rebound and no guarding.   Genitourinary:   Genitourinary Comments: Mack in --clear urine  Excoriation on scrotum    Musculoskeletal: He exhibits no edema.   Bedbound    Neurological: He is alert.   Pleasant, confused   Skin: Skin is warm and dry. Capillary refill takes less than 2 seconds. He is not diaphoretic.   Psychiatric:   Confused, dementia    Nursing note and vitals reviewed.      Significant Labs:   BMP:   Recent Labs  Lab 18  1118   *      K 3.6      CO2 31*   BUN 24*   CREATININE 0.7   CALCIUM 8.9     CBC:   Recent Labs  Lab 18  0537   WBC 10.60   HGB 12.1*   HCT 36.6*          Significant Imagin/05-puk-nelng infiltrates bases     Assessment/Plan:      * Acute respiratory failure with hypoxia and hypercapnia    Secondary to strep pneumonia, final cultures  with strep  bacteremia  Supplemental oxygen per bipap prn -was on 3 liter nc   Continue current setting of bipap.          Hypernatremia    Had hyponatremia at admission.  This resolved with fluid resuscitation  Chloride also elevated, thus this may be from  NaCl resucitation              Bacteremia    2/2 to strep pneumonia--see pneumonia  echocardiogram negative for  endocarditis--  Repeat blood cultures  Negative from  6/10           Dementia with behavioral disturbance    Dementia is uncontrolled. Dc home with hospice      And progressive with dysphagia-causing respiratory failure  Continue home dementia meds and non-pharmacologic interventions to prevent delirium (No VS between 11PM-5AM, activity during day, opening blinds, providing glasses/hearing aids, and up in chair during daytime). Use PRN anti-psychotics to prevent behavior of self harm during sundowning, and avoid narcotics and benzos unless absolutely necessary. PRN anti-psychotics prescribed to avoid self harm behaviors.   continue Aricept and Namenda.  Requiring restraints due to interference of medical equipment as he is currently bipap dependent        COPD with acute lower respiratory infection    Continue bipap with breaks, will expect improvement while on antibiotic therapy  Will follow pulmonary recommendations  Scheduled breathing treatments  See pneumonia        Hypertension    Hypertension Medications   Chronic-stable; monitoring in ICU  Medications held initial then resumed as appropriate             amlodipine (NORVASC) 10 MG tablet Take 10 mg by mouth once daily.    lisinopril (PRINIVIL,ZESTRIL) 40 MG tablet Take 40 mg by mouth once daily.    metoprolol tartrate (LOPRESSOR) 25 MG tablet Take 25 mg by mouth 2 (two) times daily.      Hospital Medications             amLODIPine tablet 10 mg Take 2 tablets (10 mg total) by mouth once daily.    hydrALAZINE injection 20 mg Inject 1 mL (20 mg total) into the vein every 6 (six) hours as needed for SBP greater than (170).    labetalol injection 20 mg Inject 4 mLs (20 mg total) into the vein every 2 (two) hours as needed for SBP greater than (SBP > 160).    lisinopril tablet 40 mg Take 1 tablet (40 mg total) by mouth once daily.    metoprolol injection  5 mg Inject 5 mLs (5 mg total) into the vein every 6 (six) hours.              ANÍBAL (acute kidney injury)    Resolved   Transition VF hydration. To free water in ngtube         Malnutrition of moderate degree    ngtube in place-tolerating tube feedings/free water         Pneumonia of right lower lobe due to infectious organism    2/2 to strep pneumonia--susceptibilities s/rocephin/pcn/vanco  As seen on xray and leukocytosis  Continue antibiotics,-x 10 days--to complete 6/17     Scheduled breathing treatements  Oxygent to keep sats >92%  Pt to dc with  Hospice next 1-2 days              VTE Risk Mitigation         Ordered     enoxaparin injection 40 mg  Daily      06/07/18 1720     IP VTE HIGH RISK PATIENT  Once      06/07/18 1720     Place AME hose  Until discontinued      06/07/18 1720     Place sequential compression device  Until discontinued      06/07/18 1720            I spent 10  minutes of phone  discussion regarding advance directives and end of life planning which included son-- family. Patient/Family understands the seriousness of their condition and would like to make their end of life decisions known as follows- pt remains full code.   [facility] to visit tomorrow and determine if they can care for patient at their facility with hospice.   Meanwhile pt alert and oxygen status improved. He requires restraints so that he does not remove his iv, ngtube, boone and get out of bed.   Consider PT eval in am to assess mobility if required     Irais Spears MD  Department of Hospital Medicine   [facility]

## 2018-06-17 NOTE — PLAN OF CARE
Problem: Patient Care Overview  Goal: Plan of Care Review  Outcome: Ongoing (interventions implemented as appropriate)  Patient receiving aerosol tx via duoneb now and q6hr.  Hr 81 and 02 saturation 94% on room air.  02 at 3lpm.  Patient had 02 off.

## 2018-06-17 NOTE — PLAN OF CARE
Problem: Patient Care Overview  Goal: Plan of Care Review  Outcome: Ongoing (interventions implemented as appropriate)  Plan of care reviewed with patient and family. 3-4 L per NC throughout shift. Wet poor quality cough. TF at goal. Tolerating well. VSS. Flexiseal for diarrhea. Good UO. Possible transfer to [facility] for hospice unit tomorrow

## 2018-06-17 NOTE — ASSESSMENT & PLAN NOTE
2/2 to strep pneumonia--susceptibilities s/rocephin/pcn/vanco  As seen on xray and leukocytosis  Continue antibiotics,-x 10 days--to complete 6/17     Scheduled breathing treatements  Oxygent to keep sats >92%  Pt to dc with  Hospice next 1-2 days

## 2018-06-18 PROCEDURE — 20000000 HC ICU ROOM

## 2018-06-18 PROCEDURE — 63600175 PHARM REV CODE 636 W HCPCS: Performed by: INTERNAL MEDICINE

## 2018-06-18 PROCEDURE — C9113 INJ PANTOPRAZOLE SODIUM, VIA: HCPCS | Performed by: INTERNAL MEDICINE

## 2018-06-18 PROCEDURE — 27000221 HC OXYGEN, UP TO 24 HOURS

## 2018-06-18 PROCEDURE — 94761 N-INVAS EAR/PLS OXIMETRY MLT: CPT

## 2018-06-18 PROCEDURE — 25000003 PHARM REV CODE 250: Performed by: HOSPITALIST

## 2018-06-18 PROCEDURE — 94640 AIRWAY INHALATION TREATMENT: CPT

## 2018-06-18 PROCEDURE — 25000242 PHARM REV CODE 250 ALT 637 W/ HCPCS: Performed by: INTERNAL MEDICINE

## 2018-06-18 PROCEDURE — 99232 SBSQ HOSP IP/OBS MODERATE 35: CPT | Mod: ,,, | Performed by: INTERNAL MEDICINE

## 2018-06-18 PROCEDURE — 25000003 PHARM REV CODE 250: Performed by: INTERNAL MEDICINE

## 2018-06-18 PROCEDURE — 25000003 PHARM REV CODE 250: Performed by: NURSE PRACTITIONER

## 2018-06-18 PROCEDURE — 63600175 PHARM REV CODE 636 W HCPCS: Performed by: HOSPITALIST

## 2018-06-18 RX ADMIN — METOPROLOL TARTRATE 5 MG: 5 INJECTION, SOLUTION INTRAVENOUS at 05:06

## 2018-06-18 RX ADMIN — FOLIC ACID 1 MG: 1 TABLET ORAL at 09:06

## 2018-06-18 RX ADMIN — HALOPERIDOL LACTATE 2.5 MG: 5 INJECTION, SOLUTION INTRAMUSCULAR at 12:06

## 2018-06-18 RX ADMIN — AMLODIPINE BESYLATE 10 MG: 5 TABLET ORAL at 09:06

## 2018-06-18 RX ADMIN — IPRATROPIUM BROMIDE AND ALBUTEROL SULFATE 3 ML: .5; 3 SOLUTION RESPIRATORY (INHALATION) at 07:06

## 2018-06-18 RX ADMIN — MEMANTINE HYDROCHLORIDE 5 MG: 5 TABLET ORAL at 09:06

## 2018-06-18 RX ADMIN — MEMANTINE HYDROCHLORIDE 5 MG: 5 TABLET ORAL at 08:06

## 2018-06-18 RX ADMIN — NYSTATIN 500000 UNITS: 100000 SUSPENSION ORAL at 05:06

## 2018-06-18 RX ADMIN — QUETIAPINE FUMARATE 25 MG: 25 TABLET ORAL at 08:06

## 2018-06-18 RX ADMIN — LAMOTRIGINE 25 MG: 25 TABLET ORAL at 09:06

## 2018-06-18 RX ADMIN — IPRATROPIUM BROMIDE AND ALBUTEROL SULFATE 3 ML: .5; 3 SOLUTION RESPIRATORY (INHALATION) at 12:06

## 2018-06-18 RX ADMIN — LISINOPRIL 40 MG: 40 TABLET ORAL at 09:06

## 2018-06-18 RX ADMIN — DONEPEZIL HYDROCHLORIDE 10 MG: 5 TABLET, FILM COATED ORAL at 08:06

## 2018-06-18 RX ADMIN — PIPERACILLIN AND TAZOBACTAM 3.38 G: 3; .375 INJECTION, POWDER, LYOPHILIZED, FOR SOLUTION INTRAVENOUS; PARENTERAL at 01:06

## 2018-06-18 RX ADMIN — METOPROLOL TARTRATE 5 MG: 5 INJECTION, SOLUTION INTRAVENOUS at 12:06

## 2018-06-18 RX ADMIN — NYSTATIN 500000 UNITS: 100000 SUSPENSION ORAL at 08:06

## 2018-06-18 RX ADMIN — NYSTATIN 500000 UNITS: 100000 SUSPENSION ORAL at 09:06

## 2018-06-18 RX ADMIN — PIPERACILLIN AND TAZOBACTAM 3.38 G: 3; .375 INJECTION, POWDER, LYOPHILIZED, FOR SOLUTION INTRAVENOUS; PARENTERAL at 09:06

## 2018-06-18 RX ADMIN — LAMOTRIGINE 25 MG: 25 TABLET ORAL at 08:06

## 2018-06-18 RX ADMIN — PANTOPRAZOLE SODIUM 40 MG: 40 INJECTION, POWDER, LYOPHILIZED, FOR SOLUTION INTRAVENOUS at 09:06

## 2018-06-18 RX ADMIN — ENOXAPARIN SODIUM 40 MG: 100 INJECTION SUBCUTANEOUS at 05:06

## 2018-06-18 RX ADMIN — PIPERACILLIN AND TAZOBACTAM 3.38 G: 3; .375 INJECTION, POWDER, LYOPHILIZED, FOR SOLUTION INTRAVENOUS; PARENTERAL at 05:06

## 2018-06-18 RX ADMIN — NYSTATIN 500000 UNITS: 100000 SUSPENSION ORAL at 12:06

## 2018-06-18 NOTE — PLAN OF CARE
Received call from [facility], [facility] is requesting a CXR and current medication list be faxed to them. The pt's son, Yaron has an appt @9am tomorrow.  I will fax the requested information to Shelly at fax#[phone]....OWEN Colin       06/18/18 7200   Discharge Reassessment   Assessment Type Discharge Planning Reassessment

## 2018-06-18 NOTE — PLAN OF CARE
I called Alyssa at [facility] (ph#[phone]) regarding the discharge plan; she will check with the family and [facility] then call me back with the plan......OWEN Colin       06/18/18 0956   Discharge Reassessment   Assessment Type Discharge Planning Reassessment

## 2018-06-18 NOTE — PLAN OF CARE
Problem: Patient Care Overview  Goal: Plan of Care Review  Outcome: Ongoing (interventions implemented as appropriate)  Care plan reviewed.  Safety maintained, restraints monitored per protocol.   Tube feeding continues at goal of 50ml/hour.  Patient remains on IV antibiotics.   Patient to be discharged to hospice possibly tomorrow.

## 2018-06-18 NOTE — PLAN OF CARE
06/17/18 1918   Patient Assessment/Suction   Level of Consciousness (AVPU) alert   Respiratory Effort Unlabored   All Lung Fields Breath Sounds rhonchi   PRE-TX-O2-ETCO2   O2 Device (Oxygen Therapy) nasal cannula   $ Is the patient on Low Flow Oxygen? Yes   Flow (L/min) 4   SpO2 (!) 92 %   Pulse Oximetry Type Continuous   $ Pulse Oximetry - Multiple Charge Pulse Oximetry - Multiple   Pulse 86   Resp (!) 21   Aerosol Therapy   $ Aerosol Therapy Charges Aerosol Treatment   Respiratory Treatment Status given   SVN/Inhaler Treatment Route with oxygen;mask   Position During Treatment HOB at 45 degrees   Patient Tolerance good   Post-Treatment   Post-treatment Heart Rate (beats/min) 84   Post-treatment Resp Rate (breaths/min) 21   All Fields Breath Sounds unchanged   Preset CPAP/BiPAP Settings   Mode Of Delivery Standby   $ Initial CPAP/BiPAP Setup? No   $ Is patient using? No/refused

## 2018-06-18 NOTE — PLAN OF CARE
Spoke with Irais at [facility]; she has followed up with [facility] now has all the paperwork they need and are now suppose to be coming out to evaluate the pt....Inessa Colin       06/18/18 6093   Discharge Reassessment   Assessment Type Discharge Planning Reassessment

## 2018-06-18 NOTE — PROGRESS NOTES
Progress Note  PULMONARY    Admit Date: 6/7/2018 06/18/2018      SUBJECTIVE:     June 9, perseverates , on niv,    Zee 10, as above still, no c/o save not feeling good  June 11, same,   June 12, much calmer but still very confused.  June 13, calmer confused state  June 14, calm but pulled out  ngt with feeding ongoing,   perseverates   June 18 calm , no resp distress      PFSH and Allergies reviewed.    OBJECTIVE:     Vitals (Most recent):  Vitals:    06/18/18 1600   BP: (!) 121/56   Pulse: 82   Resp: (!) 23   Temp: 99.1 °F (37.3 °C)       Vitals (24 hour range):  Temp:  [98.3 °F (36.8 °C)-99.1 °F (37.3 °C)]   Pulse:  [76-93]   Resp:  [9-35]   BP: (115-163)/()   SpO2:  [87 %-99 %]       Intake/Output Summary (Last 24 hours) at 06/18/18 1701  Last data filed at 06/18/18 1600   Gross per 24 hour   Intake           2207.5 ml   Output             1140 ml   Net           1067.5 ml          Physical Exam:  The patient's neuro status (alertness,orientation,cognitive function,motor skills,), pharyngeal exam (oral lesions, hygiene, abn dentition,), Neck (jvd,mass,thyroid,nodes in neck and above/below clavicle),RESPIRATORY(symmetry,effort,fremitus,percussion,auscultation),  Cor(rhythm,heart tones including gallops,perfusion,edema)ABD(distention,hepatic&splenomegaly,tenderness,masses), Skin(rash,cyanosis),Psyc(affect,judgement,).  Exam negative except for these pertinent findings:    Alert but   confused, on nc, chest is symmetric, no distress, normal percussion, normal fremitus andrattle and rhonchus. No edema, cor rrr, no murmur     Radiographs reviewed: view by direct vision  - impressive right lung infiltrate has slightly improved,  6/18      Results for orders placed during the hospital encounter of 06/07/18   X-Ray Chest 1 View    Narrative EXAMINATION:  XR CHEST 1 VIEW    CLINICAL HISTORY:  low o2 sat;    TECHNIQUE:  Single frontal view of the chest was performed.    COMPARISON:  Chest of June 7,  2018.    FINDINGS:  The cardiac size is within normal limits.  Calcification is noted in the aorta.  There is increased density in the right lower lobe infiltrate consistent with pneumonia and possible small pleural effusion.  The left lung is relatively clear.  No pneumothorax is seen.      Impression Increasing density of right lower lobe infiltrate and probable pleural effusion.  Atherosclerosis.      Electronically signed by: Juan Collier MD  Date:    06/08/2018  Time:    10:51   ]    Labs     No results for input(s): WBC, HGB, HCT, PLT, BAND, METAMYELOCYT, MYELOPCT, HGBA1C in the last 24 hours.  No results for input(s): NA, K, CL, CO2, BUN, CREATININE, GLU, CALCIUM, CAION, MG, PHOS, AST, ALT, ALKPHOS, BILITOT, BILIDIR, PROT, ALBUMIN, PREALBUMIN, AMYLASE, LIPASE, CRP, HSCRP, SEDRATE, PROCAL, INR, PTT, LABHEPA, LACTATE, TROPONINI, CPK, CPKMB, MB, BNP in the last 24 hours.  No results for input(s): PH, PCO2, PO2, HCO3 in the last 24 hours.  Microbiology Results (last 7 days)     Procedure Component Value Units Date/Time    Blood culture [[id number]] Collected:  06/10/18 1523    Order Status:  Completed Specimen:  Blood from Antecubital, Left  Arm Updated:  06/16/18 0612     Blood Culture, Routine No growth after 5 days.    Blood culture [[id number]] Collected:  06/10/18 1523    Order Status:  Completed Specimen:  Blood from Antecubital, Right Updated:  06/16/18 0612     Blood Culture, Routine No growth after 5 days.    Clostridium difficile EIA [[id number]]     Order Status:  Canceled Specimen:  Stool from Stool     Culture, Respiratory with Gram Stain [[id number]]  (Susceptibility) Collected:  06/09/18 0453    Order Status:  Completed Specimen:  Respiratory from Tracheal Aspirate Updated:  06/12/18 0954     Respiratory Culture --     ESCHERICHIA COLI  Few       Respiratory Culture --     CANDIDA TROPICALIS  Moderate       Gram Stain (Respiratory) <10 epithelial cells per low power field.     Gram Stain  (Respiratory) Rare WBC's     Gram Stain (Respiratory) No organisms seen          Impression:  Active Hospital Problems    Diagnosis  POA    *Acute respiratory failure with hypoxia and hypercapnia [J96.01, J96.02]  Yes    Dementia with behavioral disturbance [F03.91]  Yes    Hypernatremia [E87.0]  No    Bacteremia [R78.81]  Yes    Pneumococcal bacteremia [R78.81]  Yes    COPD with acute lower respiratory infection [J44.0]  Yes    Pneumonia of right lower lobe due to infectious organism [J18.1]  Yes    Malnutrition of moderate degree [E44.0]  Yes    ANÍBAL (acute kidney injury) [N17.9]  Yes    Hypertension [I10]  Yes      Resolved Hospital Problems    Diagnosis Date Resolved POA    Hyponatremia [E87.1] 06/10/2018 Yes     Plan:   June 9, expect aspiration, still too unstable to get off bipap.  [place] remains poor.  Cont abx and observe.    Zee 10 , very alert , confused, on bipap still.  Stop vanc with gnr in sputum, and strep pneumo in blood.  [place] remains poor.      June 11, resp seems slowly better but bed bound and very demented.  Delirium issue.  May improve but doubt will do well.  June 12, ecoli in sputum and pneumococcus in blood.  No new recs  June 13, will f/u cxr am, peg may be reasonable?? Hospice seems reasonable if family directs.  June 14, aspirated tube feed , poor outlook - son considering hospice or ?\peg  June 18, resp better but not dementia, not expected to do well.  Had been on zosyn since admit- could stop soon                .

## 2018-06-18 NOTE — PROGRESS NOTES
Progress Note  Hospital Medicine  Patient Name:Angel Pineda  MRN:  [id number]  Patient Class: IP- Inpatient  Admit Date: 6/7/2018  Length of Stay: 11 days  Expected Discharge Date:   Attending Physician: Aston Washington MD  Primary Care Provider:  Primary Doctor No    SUBJECTIVE:     Principal Problem: Acute respiratory failure with hypoxia and hypercapnia  Initial history of present illness: Patient is a 73 y.o. male admitted to Hospitalist Service from [facility] Room with complaint of SOB. Patient reportedly has past medical history significant for hypertension and dementia. Patient is a resident of [facility]. Further detail of HPI are not available. Patient's son is at bedside who is POA. He reports, patient had some diarrhea for 1-2 days but no fever. He was started on some antibiotics 2 days ago.     PMH/PSH/SH/FH/Meds: reviewed.    Symptoms/Review of Systems: In ICU, now on 4L/min oxygen via NC. Non-verbal. Tolerating NGT feeding. Awaiting hospice arrangements.  Diet:  NGT feeding  Activity level: Up with assistance  Pain:  NAD    OBJECTIVE:   Vital Signs (Most Recent):      Temp: 98.5 °F (36.9 °C) (06/18/18 0800)  Pulse: 85 (06/18/18 0800)  Resp: (!) 21 (06/18/18 0800)  BP: 128/61 (06/18/18 0904)  SpO2: (!) 87 % (06/18/18 0800)       Vital Signs Range (Last 24H):  Temp:  [98.3 °F (36.8 °C)-99.2 °F (37.3 °C)]   Pulse:  [32-93]   Resp:  [19-35]   BP: (122-177)/(59-83)   SpO2:  [87 %-99 %]     Weight: 104 kg (229 lb 4.5 oz)  Body mass index is 30.25 kg/m².    Intake/Output Summary (Last 24 hours) at 06/18/18 0919  Last data filed at 06/18/18 0800   Gross per 24 hour   Intake             1830 ml   Output             1675 ml   Net              155 ml     Physical Examination:  General appearance: well developed, appears stated age,  Head: normocephalic, atraumatic  Eyes:  conjunctivae/corneas clear. PERRL.  Nose: Nares normal. Septum midline.  Throat: lips, mucosa, and tongue normal;  teeth and gums normal, no throat erythema.  Neck: supple, symmetrical, trachea midline, no JVD and thyroid not enlarged, symmetric, no tenderness/mass/nodules  Lungs:  clear to auscultation bilaterally and normal respiratory effort  Chest wall: no tenderness  Heart: regular rate and rhythm, S1, S2 normal, no murmur, click, rub or gallop  Abdomen: soft, non-tender non-distented; bowel sounds normal; no masses,  no organomegaly  Extremities: no cyanosis, clubbing or edema.   Pulses: 2+ and symmetric  Skin: Skin color, texture, turgor normal. No rashes or lesions.  Lymph nodes: Cervical, supraclavicular, and axillary nodes normal.  Neurologic: Advanced dementia, lethargic and unable to respond verbal commands    CBC:    Recent Labs  Lab 06/14/18  0319 06/15/18  0610 06/16/18  0537   WBC 11.60 12.40 10.60   RBC 4.15* 4.08* 3.96*   HGB 12.6* 12.5* 12.1*   HCT 38.1* 37.5* 36.6*    245 253   MCV 92 92 92   MCH 30.4 30.6 30.6   MCHC 33.1 33.3 33.2   BMP    Recent Labs  Lab 06/12/18  1138  06/15/18  2343 06/16/18  0537 06/16/18  1118   *  < > 186* 194*  194* 174*   *  < > 147* 144  144 144   K 3.6  < > 3.4* 3.6  3.6 3.6   *  < > 108 107  107 106   CO2 33*  < > 33* 32*  32* 31*   BUN 26*  < > 27* 25*  25* 24*   CREATININE 0.8  < > 0.9 0.8  0.8 0.7   CALCIUM 8.9  < > 8.9 8.7  8.7 8.9   MG 1.6  --   --   --   --    < > = values in this interval not displayed.   Diagnostic Results:  Microbiology Results (last 7 days)     Procedure Component Value Units Date/Time    Blood culture [[id number]] Collected:  06/10/18 1523    Order Status:  Completed Specimen:  Blood from Antecubital, Left  Arm Updated:  06/16/18 0612     Blood Culture, Routine No growth after 5 days.    Blood culture [[id number]] Collected:  06/10/18 1523    Order Status:  Completed Specimen:  Blood from Antecubital, Right Updated:  06/16/18 0612     Blood Culture, Routine No growth after 5 days.    Clostridium difficile EIA  [[id number]]     Order Status:  Canceled Specimen:  Stool from Stool     Culture, Respiratory with Gram Stain [[id number]]  (Susceptibility) Collected:  06/09/18 0453    Order Status:  Completed Specimen:  Respiratory from Tracheal Aspirate Updated:  06/12/18 0954     Respiratory Culture --     ESCHERICHIA COLI  Few       Respiratory Culture --     CANDIDA TROPICALIS  Moderate       Gram Stain (Respiratory) <10 epithelial cells per low power field.     Gram Stain (Respiratory) Rare WBC's     Gram Stain (Respiratory) No organisms seen         Chest X-Ray: Right lung predominant interstitial changes may represent asymmetric pulmonary edema or an atypical infectious process.    ECHO:  · The left ventricle cavity is normal.  · Left ventricle ejection fraction is at 55%  · Normal LV diastolic function.  · RV systolic function is normal.  · Left atrium is mildly dilated.  · RA cavity size is normal.  · Mitral vavlve is normal.  · Aortic valve is normal.  · Pericardium is normal.    CXR: Nasogastric tube as above.  Bibasilar airspace disease and small pleural effusions without significant change.    CXR: Continued bibasilar infiltrates suggesting CHF.    Assessment/Plan:      *Acute hypoxemic and hypercarbic respiratory failure [J96.01]   Yes    Pneumonia of right lower lobe due to E. Coli sp [J18.1]  Strep. Pneumoniae bacteremia  Supplemental O2 via nasal canula; titrate O2 saturation to >92%. Use BiPAP now. Follow pulmonary recommendations.  Continue beta 2 agonist bronchodilator treatments.   Continue IV antibiotics - Zosyn. Pharmacist to dose Vancomycin.  Continue routine medications as before.       Yes    Malnutrition of moderate degree [E44.0]  Encourage maximal PO intake. Diet supplementation ordered per nutrition approval. Will encourage PO and monitor closely for weight changes.      Yes    ANÍBAL (acute kidney injury) [N17.9] - resolved  Follow BMP.    Swallow dysfunction  Patient is receiving NGT feeding.  Patient has failed swallow testing x 3. I discussed with patient's son again at length and different optioned reviewed including PEG placement if desired. He wants to think about it and will let us know his wishes. He is leaning towards home hospice care.      Yes    Hypernatremia - much better  Follow BMP.  Receiving free-water via NGT.      Yes    Hypertension, uncontrolled [I10]  Tele-monitoring.  Chronic medications and monitor for any changes, adjusting as needed.  Use Hydralazine 10 mg IV q 2 hrs prn SBP > 160 mmHg.     Dementia  Dementia is controlled currently. Continue home dementia meds and non-pharmacologic interventions to prevent delirium (No VS between 11PM-5AM, activity during day, opening blinds, providing glasses/hearing aids, and up in chair during daytime). Use PRN anti-psychotics to prevent behavior of self harm during sundowning, and avoid narcotics and benzos unless absolutely necessary. PRN anti-psychotics prescribed to avoid self harm behaviors. On Aricept and Namenda.   Yes       DVT prophylaxis: Lovenox 40 mg SQ q day.    Code Status: Full code  Prognosis; poor    VTE Risk Mitigation         Ordered     enoxaparin injection 40 mg  Daily      06/07/18 1720     IP VTE HIGH RISK PATIENT  Once      06/07/18 1720     Place AME hose  Until discontinued      06/07/18 1720     Place sequential compression device  Until discontinued      06/07/18 1720        Aston Washington MD  Department of Hospital Medicine   [facility]    Addendum 12:00 noon.  I had a family meeting with patient's son and daughter in law. I updateed them regarding patient's overall condition not improving. They are agreed, patientis having a poor quality of life and likely is not going to do well. We discussed hospice care and they will be discussing amonst family and will let us know their wishes.We also discussed, if patient does bot swallow well, he may need PEG. Time spent in care of the patient, counseling  and coordination of care (Greater than 50% spent in direct face to face contact): 35 min.

## 2018-06-18 NOTE — PROGRESS NOTES
I spoke to Patricia with [facility] at [phone] and she stated that she is not aware of the pt coming and she is the patient care coordinator. She is going to look into it and call me back. Bonita Burns LMSW    Patricia returned my call and stated that she spoke to the  and she is aware of the client however she does not have completed paperwork. The paperwork has to be signed by a doctor so once the paperwork is completed she will come do an assessment to see if he is a good fit for their facility. Boniat Burns LMSW

## 2018-06-18 NOTE — PLAN OF CARE
06/18/18 0722   Patient Assessment/Suction   Level of Consciousness (AVPU) alert   Respiratory Effort Unlabored   All Lung Fields Breath Sounds coarse   PRE-TX-O2-ETCO2   O2 Device (Oxygen Therapy) nasal cannula   $ Is the patient on Low Flow Oxygen? Yes   Flow (L/min) 4   Oxygen Concentration (%) 32   SpO2 97 %   Pulse Oximetry Type Continuous   $ Pulse Oximetry - Multiple Charge Pulse Oximetry - Multiple   Pulse 76   Resp (!) 24   Aerosol Therapy   $ Aerosol Therapy Charges Aerosol Treatment   Respiratory Treatment Status given   SVN/Inhaler Treatment Route mask   Position During Treatment HOB at 30 degrees   Patient Tolerance good   Post-Treatment   Post-treatment Heart Rate (beats/min) 76   Post-treatment Resp Rate (breaths/min) 21   All Fields Breath Sounds unchanged   Ready to Wean/Extubation Screen   FIO2<=50 (chart decimal) 0.32

## 2018-06-18 NOTE — PLAN OF CARE
I called Alyssa at [facility] regarding Bonita's conversation with Sangeeta at [facility]; she states that she had spoke with Shelly and was unaware they were waiting for any papers. She is going to call Sangeeta at [facility] to assist with whatever documentation and signatures they need.   I stressed the fact that we need to have placement for the pt ASAP since we have been holding him here since Friday waiting. She verbalized understanding and will call me back after speaking with [facility]....OWEN Colin       06/18/18 4253   Discharge Reassessment   Assessment Type Discharge Planning Reassessment

## 2018-06-19 VITALS
HEART RATE: 91 BPM | RESPIRATION RATE: 27 BRPM | SYSTOLIC BLOOD PRESSURE: 154 MMHG | DIASTOLIC BLOOD PRESSURE: 69 MMHG | TEMPERATURE: 98 F | WEIGHT: 229.25 LBS | HEIGHT: 73 IN | OXYGEN SATURATION: 89 % | BODY MASS INDEX: 30.38 KG/M2

## 2018-06-19 PROBLEM — R06.02 SHORTNESS OF BREATH: Status: ACTIVE | Noted: 2018-06-19

## 2018-06-19 PROCEDURE — 27000221 HC OXYGEN, UP TO 24 HOURS

## 2018-06-19 PROCEDURE — 25000003 PHARM REV CODE 250: Performed by: INTERNAL MEDICINE

## 2018-06-19 PROCEDURE — 25000003 PHARM REV CODE 250: Performed by: HOSPITALIST

## 2018-06-19 PROCEDURE — 94640 AIRWAY INHALATION TREATMENT: CPT

## 2018-06-19 PROCEDURE — 99239 HOSP IP/OBS DSCHRG MGMT >30: CPT | Mod: ,,, | Performed by: INTERNAL MEDICINE

## 2018-06-19 PROCEDURE — 63600175 PHARM REV CODE 636 W HCPCS: Performed by: HOSPITALIST

## 2018-06-19 PROCEDURE — 63600175 PHARM REV CODE 636 W HCPCS: Performed by: INTERNAL MEDICINE

## 2018-06-19 PROCEDURE — 25000242 PHARM REV CODE 250 ALT 637 W/ HCPCS: Performed by: INTERNAL MEDICINE

## 2018-06-19 PROCEDURE — 94761 N-INVAS EAR/PLS OXIMETRY MLT: CPT

## 2018-06-19 PROCEDURE — 99231 SBSQ HOSP IP/OBS SF/LOW 25: CPT | Mod: ,,, | Performed by: INTERNAL MEDICINE

## 2018-06-19 PROCEDURE — C9113 INJ PANTOPRAZOLE SODIUM, VIA: HCPCS | Performed by: INTERNAL MEDICINE

## 2018-06-19 RX ORDER — AMOXICILLIN 250 MG
1 CAPSULE ORAL DAILY PRN
COMMUNITY
Start: 2018-06-19

## 2018-06-19 RX ORDER — DIPHENOXYLATE HYDROCHLORIDE AND ATROPINE SULFATE 2.5; .025 MG/1; MG/1
1 TABLET ORAL 4 TIMES DAILY PRN
Start: 2018-06-19 | End: 2018-06-29

## 2018-06-19 RX ORDER — DOXYCYCLINE HYCLATE 100 MG
100 TABLET ORAL 2 TIMES DAILY
Qty: 10 TABLET | Refills: 0
Start: 2018-06-19 | End: 2018-06-24

## 2018-06-19 RX ORDER — GUAIFENESIN 600 MG/1
600 TABLET, EXTENDED RELEASE ORAL 2 TIMES DAILY
Qty: 20 TABLET | Refills: 0 | COMMUNITY
Start: 2018-06-19 | End: 2018-06-29

## 2018-06-19 RX ADMIN — PIPERACILLIN AND TAZOBACTAM 3.38 G: 3; .375 INJECTION, POWDER, LYOPHILIZED, FOR SOLUTION INTRAVENOUS; PARENTERAL at 12:06

## 2018-06-19 RX ADMIN — PIPERACILLIN AND TAZOBACTAM 3.38 G: 3; .375 INJECTION, POWDER, LYOPHILIZED, FOR SOLUTION INTRAVENOUS; PARENTERAL at 09:06

## 2018-06-19 RX ADMIN — METOPROLOL TARTRATE 5 MG: 5 INJECTION, SOLUTION INTRAVENOUS at 12:06

## 2018-06-19 RX ADMIN — IPRATROPIUM BROMIDE AND ALBUTEROL SULFATE 3 ML: .5; 3 SOLUTION RESPIRATORY (INHALATION) at 12:06

## 2018-06-19 RX ADMIN — HALOPERIDOL LACTATE 2.5 MG: 5 INJECTION, SOLUTION INTRAMUSCULAR at 04:06

## 2018-06-19 RX ADMIN — METOPROLOL TARTRATE 5 MG: 5 INJECTION, SOLUTION INTRAVENOUS at 05:06

## 2018-06-19 RX ADMIN — IPRATROPIUM BROMIDE AND ALBUTEROL SULFATE 3 ML: .5; 3 SOLUTION RESPIRATORY (INHALATION) at 08:06

## 2018-06-19 RX ADMIN — NYSTATIN 500000 UNITS: 100000 SUSPENSION ORAL at 09:06

## 2018-06-19 RX ADMIN — IPRATROPIUM BROMIDE AND ALBUTEROL SULFATE 3 ML: .5; 3 SOLUTION RESPIRATORY (INHALATION) at 01:06

## 2018-06-19 RX ADMIN — NYSTATIN 500000 UNITS: 100000 SUSPENSION ORAL at 01:06

## 2018-06-19 RX ADMIN — PANTOPRAZOLE SODIUM 40 MG: 40 INJECTION, POWDER, LYOPHILIZED, FOR SOLUTION INTRAVENOUS at 09:06

## 2018-06-19 NOTE — PLAN OF CARE
Spoke with Patricia at [facility] (ph#[phone]); she is leaving there now to come evaluate the pt. She states that she was unaware of the assessment until 4pm yesterday and it was too late in the day to come see the pt, I reminded her that Bonita spoke with her at noon yesterday but she did not offer an explanation.....Inessa Colin       06/19/18 3563   Discharge Reassessment   Assessment Type Discharge Planning Reassessment

## 2018-06-19 NOTE — PLAN OF CARE
Problem: Patient Care Overview  Goal: Plan of Care Review  Outcome: Outcome(s) achieved Date Met: 06/19/18  Pt remained safe without falls. Assisted to bedside chair. Tolerated well.     Problem: Restraint, Nonbehavioral (nonviolent)  Goal: Clinical Justification  Outcome: Outcome(s) achieved Date Met: 06/19/18  Restraints removed at noon. Tolerated well.

## 2018-06-19 NOTE — PLAN OF CARE
Received call from Alyssa at [facility] stating that the pt is ready to go to [facility] today. She is having the equipment delivered then the nurse can call report to Patricia at [facility], ph#[phone]. The pt will go via ambulance due to his oxygen need per hospice. Alyssa is requesting we fax the discharge orders to [facility] once they are complete and she will get a copy from there.   Alyssa will call me when the equipment is delivered.....OWEN Colin       06/19/18 6406   Discharge Reassessment   Assessment Type Discharge Planning Reassessment

## 2018-06-19 NOTE — PLAN OF CARE
06/18/18 1922   Patient Assessment/Suction   Level of Consciousness (AVPU) alert   Respiratory Effort Unlabored   All Lung Fields Breath Sounds coarse;rhonchi   PRE-TX-O2-ETCO2   O2 Device (Oxygen Therapy) nasal cannula w/ humidification   $ Is the patient on Low Flow Oxygen? Yes   Flow (L/min) 5   SpO2 (!) 94 %   Pulse Oximetry Type Continuous   $ Pulse Oximetry - Multiple Charge Pulse Oximetry - Multiple   Pulse 81   Resp 15   Aerosol Therapy   $ Aerosol Therapy Charges Aerosol Treatment   Respiratory Treatment Status given   SVN/Inhaler Treatment Route mask;with oxygen   Position During Treatment HOB at 45 degrees   Patient Tolerance good   Post-Treatment   Post-treatment Heart Rate (beats/min) 83   Post-treatment Resp Rate (breaths/min) 18   All Fields Breath Sounds aeration increased   Preset CPAP/BiPAP Settings   Mode Of Delivery Standby

## 2018-06-19 NOTE — PROGRESS NOTES
Progress Note  PULMONARY    Admit Date: 6/7/2018 06/19/2018      SUBJECTIVE:     June 9, perseverates , on niv,    Zee 10, as above still, no c/o save not feeling good  June 11, same,   June 12, much calmer but still very confused.  June 13, calmer confused state  June 14, calm but pulled out  ngt with feeding ongoing,   perseverates   June 18 calm , no resp distress  June 19, aggitated slight.    PFSH and Allergies reviewed.    OBJECTIVE:     Vitals (Most recent):  Vitals:    06/19/18 1100   BP: (!) 163/61   Pulse: 93   Resp: (!) 30   Temp:        Vitals (24 hour range):  Temp:  [98.2 °F (36.8 °C)-99.1 °F (37.3 °C)]   Pulse:  [71-93]   Resp:  [0-34]   BP: (115-163)/(56-78)   SpO2:  [84 %-100 %]       Intake/Output Summary (Last 24 hours) at 06/19/18 1246  Last data filed at 06/19/18 0552   Gross per 24 hour   Intake            577.5 ml   Output              885 ml   Net           -307.5 ml          Physical Exam:  The patient's neuro status (alertness,orientation,cognitive function,motor skills,), pharyngeal exam (oral lesions, hygiene, abn dentition,), Neck (jvd,mass,thyroid,nodes in neck and above/below clavicle),RESPIRATORY(symmetry,effort,fremitus,percussion,auscultation),  Cor(rhythm,heart tones including gallops,perfusion,edema)ABD(distention,hepatic&splenomegaly,tenderness,masses), Skin(rash,cyanosis),Psyc(affect,judgement,).  Exam negative except for these pertinent findings:    Restless and confussed, on nc, chest is symmetric, no distress, normal percussion, normal fremitus andrattle and rhonchus. No edema, cor rrr, no murmur     Radiographs reviewed: view by direct vision  - impressive right lung infiltrate has slightly improved,  6/18      Results for orders placed during the hospital encounter of 06/07/18   X-Ray Chest 1 View    Narrative EXAMINATION:  XR CHEST 1 VIEW    CLINICAL HISTORY:  low o2 sat;    TECHNIQUE:  Single frontal view of the chest was performed.    COMPARISON:  Chest of June 7,  2018.    FINDINGS:  The cardiac size is within normal limits.  Calcification is noted in the aorta.  There is increased density in the right lower lobe infiltrate consistent with pneumonia and possible small pleural effusion.  The left lung is relatively clear.  No pneumothorax is seen.      Impression Increasing density of right lower lobe infiltrate and probable pleural effusion.  Atherosclerosis.      Electronically signed by: Juan Collier MD  Date:    06/08/2018  Time:    10:51   ]    Labs     No results for input(s): WBC, HGB, HCT, PLT, BAND, METAMYELOCYT, MYELOPCT, HGBA1C in the last 24 hours.  No results for input(s): NA, K, CL, CO2, BUN, CREATININE, GLU, CALCIUM, CAION, MG, PHOS, AST, ALT, ALKPHOS, BILITOT, BILIDIR, PROT, ALBUMIN, PREALBUMIN, AMYLASE, LIPASE, CRP, HSCRP, SEDRATE, PROCAL, INR, PTT, LABHEPA, LACTATE, TROPONINI, CPK, CPKMB, MB, BNP in the last 24 hours.  No results for input(s): PH, PCO2, PO2, HCO3 in the last 24 hours.  Microbiology Results (last 7 days)     Procedure Component Value Units Date/Time    Blood culture [[id number]] Collected:  06/10/18 1523    Order Status:  Completed Specimen:  Blood from Antecubital, Left  Arm Updated:  06/16/18 0612     Blood Culture, Routine No growth after 5 days.    Blood culture [[id number]] Collected:  06/10/18 1523    Order Status:  Completed Specimen:  Blood from Antecubital, Right Updated:  06/16/18 0612     Blood Culture, Routine No growth after 5 days.    Clostridium difficile EIA [[id number]]     Order Status:  Canceled Specimen:  Stool from Stool           Impression:  Active Hospital Problems    Diagnosis  POA    *Acute respiratory failure with hypoxia and hypercapnia [J96.01, J96.02]  Yes    Dementia with behavioral disturbance [F03.91]  Yes    Hypernatremia [E87.0]  No    Bacteremia [R78.81]  Yes    Pneumococcal bacteremia [R78.81]  Yes    COPD with acute lower respiratory infection [J44.0]  Yes    Pneumonia of right lower lobe due to  infectious organism [J18.1]  Yes    Malnutrition of moderate degree [E44.0]  Yes    ANÍBAL (acute kidney injury) [N17.9]  Yes    Hypertension [I10]  Yes      Resolved Hospital Problems    Diagnosis Date Resolved POA    Hyponatremia [E87.1] 06/10/2018 Yes     Plan:   June 9, expect aspiration, still too unstable to get off bipap.  [place] remains poor.  Cont abx and observe.    Zee 10 , very alert , confused, on bipap still.  Stop vanc with gnr in sputum, and strep pneumo in blood.  [place] remains poor.      June 11, resp seems slowly better but bed bound and very demented.  Delirium issue.  May improve but doubt will do well.  June 12, ecoli in sputum and pneumococcus in blood.  No new recs  June 13, will f/u cxr am, peg may be reasonable?? Hospice seems reasonable if family directs.  June 14, aspirated tube feed , poor outlook - son considering hospice or ?\peg  June 18, resp better but not dementia, not expected to do well.  Had been on zosyn since admit- could stop soon  June 19, more confused today,no new recommendation.  Could stop abx soon              .

## 2018-06-19 NOTE — DISCHARGE SUMMARY
Discharge Summary  Hospital Medicine    Admit Date: 6/7/2018    Date and Time: 6/19/201812:55 PM    Discharge Attending Physician: Aston Washington MD    Primary Care Physician: Primary Doctor No    Diagnoses:  Active Hospital Problems    Diagnosis  POA    *Acute respiratory failure with hypoxia and hypercapnia [J96.01, J96.02]   Pneumonia of right lower lobe due to E. Coli sp [J18.1]  Strep. Pneumoniae bacteremia     Yes    Dementia with behavioral disturbance [F03.91]  Yes    Hypernatremia [E87.0]  No    Bacteremia [R78.81]  Yes    Pneumococcal bacteremia [R78.81]  Yes    COPD with acute lower respiratory infection [J44.0]  Yes    Pneumonia of right lower lobe due to infectious organism [J18.1]  Yes    Malnutrition of moderate degree [E44.0]  Yes    ANÍBLA (acute kidney injury) [N17.9]  Yes    Hypertension [I10]  Swallow dysfunction  Yes      Resolved Hospital Problems    Diagnosis Date Resolved POA    Hyponatremia [E87.1] 06/10/2018 Yes     Discharged Condition: Good    Hospital Course:   Patient is a 73 y.o. male admitted to Hospitalist Service from [facility] Emergency Room with complaint of SOB. Patient reportedly has past medical history significant for hypertension and dementia. Patient is a resident of [facility]. Further detail of HPI are not available. Patient's son was at bedside who is POA. He reports, patient had some diarrhea for 1-2 days but no fever. He was started on some antibiotics 2 days ago. Patient was admitted to Hospitalist medicine service. Patient was managed in ICU for respiratory failure and aspiration pneumonia. Pulmonary medicine followed the patient. Speech therapy evaluated patient and repeatedly failed swallow evaluation. Patient received NGT feedings. Patient treated with IV antibiotics. Patient's son declined PEG placement and opted for resumption of hospice care at [facility]. Patient was discharged to [facility] with Hospice in stable  condition with following discharge plan of care. Total time with the patient was 30 minutes and greater than 50% was spent in counseling and coordination of care. The assessment and plan have been discussed at length. Physicians' notes reviewed. Labs and procedure reviewed.     Consults: Dr. Douglas    Significant Diagnostic Studies:   Chest X-Ray: Right lung predominant interstitial changes may represent asymmetric pulmonary edema or an atypical infectious process.     ECHO:  · The left ventricle cavity is normal.  · Left ventricle ejection fraction is at 55%  · Normal LV diastolic function.  · RV systolic function is normal.  · Left atrium is mildly dilated.  · RA cavity size is normal.  · Mitral vavlve is normal.  · Aortic valve is normal.  · Pericardium is normal.     CXR: Nasogastric tube as above.  Bibasilar airspace disease and small pleural effusions without significant change.     CXR: Continued bibasilar infiltrates suggesting CHF.    Microbiology Results (last 7 days)     Procedure Component Value Units Date/Time    Blood culture [[id number]] Collected:  06/10/18 1523    Order Status:  Completed Specimen:  Blood from Antecubital, Left  Arm Updated:  06/16/18 0612     Blood Culture, Routine No growth after 5 days.    Blood culture [[id number]] Collected:  06/10/18 1523    Order Status:  Completed Specimen:  Blood from Antecubital, Right Updated:  06/16/18 0612     Blood Culture, Routine No growth after 5 days.    Clostridium difficile EIA [[id number]]     Order Status:  Canceled Specimen:  Stool from Stool         Special Treatments/Procedures: None  Disposition: [facility] Unit with hospice    Medications:  Reconciled Home Medications: Current Discharge Medication List      START taking these medications    Details   diphenoxylate-atropine 2.5-0.025 mg (LOMOTIL) 2.5-0.025 mg per tablet Take 1 tablet by mouth 4 (four) times daily as needed for Diarrhea.      senna-docusate 8.6-50 mg (PERICOLACE) 8.6-50 mg  per tablet Take 1 tablet by mouth daily as needed for Constipation.         CONTINUE these medications which have CHANGED    Details   doxycycline (VIBRA-TABS) 100 MG tablet Take 1 tablet (100 mg total) by mouth 2 (two) times daily.  Qty: 10 tablet, Refills: 0      guaiFENesin (MUCINEX) 600 mg 12 hr tablet Take 1 tablet (600 mg total) by mouth 2 (two) times daily.  Qty: 20 tablet, Refills: 0         CONTINUE these medications which have NOT CHANGED    Details   albuterol-ipratropium 2.5mg-0.5mg/3mL (DUO-NEB) 0.5 mg-3 mg(2.5 mg base)/3 mL nebulizer solution Take 3 mLs by nebulization every 6 (six) hours as needed for Wheezing.  Qty: 25 vial, Refills: 2      amlodipine (NORVASC) 10 MG tablet Take 10 mg by mouth once daily.      donepezil (ARICEPT) 10 MG tablet Take 10 mg by mouth every evening.      folic acid (FOLVITE) 1 MG tablet Take 1 mg by mouth once daily.      lamotrigine (LAMICTAL) 25 MG tablet Take 25 mg by mouth 2 (two) times daily.       lisinopril (PRINIVIL,ZESTRIL) 40 MG tablet Take 40 mg by mouth once daily.      loperamide (IMODIUM) 2 mg capsule Take 2 mg by mouth 2 (two) times daily as needed for Diarrhea.      memantine (NAMENDA) 5 MG Tab Take 5 mg by mouth 2 (two) times daily.      metoprolol tartrate (LOPRESSOR) 25 MG tablet Take 25 mg by mouth 2 (two) times daily.      multivitamin (THERAGRAN) per tablet Take 1 tablet by mouth once daily.      quetiapine (SEROQUEL) 25 MG Tab Take 25 mg by mouth every evening.       sertraline (ZOLOFT) 50 MG tablet Take 50 mg by mouth once daily.      thiamine (VITAMIN B-1) 100 MG tablet Take 100 mg by mouth once daily.      tramadol (ULTRAM) 50 mg tablet Take 50 mg by mouth every 12 (twelve) hours as needed for Pain.       trazodone (DESYREL) 50 MG tablet Take 50 mg by mouth every evening.      triamcinolone acetonide 0.1% (KENALOG) 0.1 % cream Apply topically 2 (two) times daily.             Discharge Procedure Orders  Diet Cardiac   Scheduling Instructions: Soft  mechanical with aspiration precautions     Activity as tolerated   Order Comments: Observe fall precautions.     Call MD for:   Order Comments: For worsening symptoms, chest pain, shortness of breath, increased abdominal pain, high grade fever, stroke or stroke like symptoms, immediately go to the nearest Emergency Room or call 911 as soon as possible.       Follow-up Information     [facility].    Why:  [facility] office [phone]   Contact information:  [facility]  [phone]             Primary Doctor No.    Why:  As needed

## 2018-06-19 NOTE — PROGRESS NOTES
Patient restrained, but managed to undo one restraint and pull NG tube out. Patient calm upon entering room. No s/sx of distress noted. Patient cleaned, and assisted into chair at bedside.

## 2018-06-19 NOTE — PROGRESS NOTES
Patient left unit via ambulance stretcher with O2 via cannula. No s/sx of distress noted. Headed to [facility]. Report called to Alyssa

## 2018-06-19 NOTE — PLAN OF CARE
Problem: Patient Care Overview  Goal: Plan of Care Review  Outcome: Ongoing (interventions implemented as appropriate)  Bipap at bedside PRN with Nc 5 lpm in use with aerosol txs Q6 hrs tolerates well

## 2018-06-19 NOTE — PROGRESS NOTES
I added [facility] to the pts AVS. I completed the ambulance transport form and placed a copy in the pts blue folder. Cm will continue to follow. Bonita Burns LMSW      11:57- Per Irais Tejeda, [facility] is not in [facility] at this time. Unable to book referral as needed. Bonita Burns LMSW     1:30 Per [facility]- [phone], discharge orders can be faxed to [phone], confirmation received.   Report can be called to Patricia at [phone] and the pt will transport via ambulance. Pts nurse updated. Bonita Burns LMSW

## 2018-06-19 NOTE — PT/OT/SLP PROGRESS
Speech Language Pathology      Angel Pineda  MRN: [id number]    Pt remains NPO with NGT. Please reconsult when pt appropriate for PO intake.     Erika Rod, CARLOS-SLP

## 2018-06-20 NOTE — PLAN OF CARE
06/20/18 0845   Final Note   Assessment Type Final Discharge Note   Discharge Disposition [facility]

## 2020-11-02 NOTE — PT/OT/SLP EVAL
Postoperative Note    DATE OF SURGERY:  No surgery found  SURGERY:  No surgery found    HISTORY:  Nika Abreu is a 64 year old female presenting for for evaluation after her right rotator cuff repair.  She had surgery on December 11, 2019. She had a full-thickness supraspinatus tear.  She had an arthroscopic debridement biceps tenotomy arthroscopic rotator cuff repair subacromial decompression and distal clavicle resection.  She is complaining of severe pain.  She ran out of pain pills several days ago.  She has been working with therapy and has about 30 or 40° of shoulder flexion and extension.    Current Outpatient Medications   Medication Sig   • HYDROcodone-acetaminophen (NORCO) 5-325 MG per tablet Take 1 tablet by mouth every 6 hours as needed for Pain.   • dulaglutide (TRULICITY) 1.5 MG/0.5ML pen-injector Inject 1.5 mg into the skin weekly   • Continuous Blood Gluc  (FREESTYLE CHRISTOPHER 14 DAY READER) Device 1 each 4 times daily.   • Continuous Blood Gluc Sensor (FREESTYLE CHRISTOPHER 14 DAY SENSOR) Misc 2 each 4 times daily.   • blood glucose test strip Test blood sugar 4 times daily as directed. Diagnosis: E11.9. Meter:Insurance Preference   • Insulin Glargine, 1 Unit Dial, (TOUJEO SOLOSTAR) 300 UNIT/ML pen-injector Inject 76 units nightly, split dose into 2 different injections sites (38 units each).  Prime 3 units before each dose.   • magnesium oxide 250 MG tablet Take 1 tablet by mouth daily. (Patient taking differently: Take 250 mg by mouth 2 times daily. )   • exemestane (AROMASIN) 25 MG tablet Take 1 tablet by mouth daily (with dinner).   • Insulin Lispro, 1 Unit Dial, (HUMALOG KWIKPEN) 100 UNIT/ML pen-injector Inject 40 w/bkfst; 38u w/lunch,& 34w/dinner,BS<100-6u,BS>150+2u, >200+4u, >250+6u, >300+8u, bedtime snack 12u   • albuterol 108 (90 Base) MCG/ACT inhaler Inhale 2 puffs into the lungs every 4 hours as needed for Shortness of Breath or Wheezing.   • Spacer/Aero-Holding Chambers (E-Z SPACER)  Speech Language Pathology Evaluation  Bedside Swallow    Patient Name:  Angle Pineda   MRN:  [id number]  Admitting Diagnosis: Acute respiratory failure with hypoxia and hypercapnia    Recommendations:                 General Recommendations:  CSE  Diet recommendations:  NPO, NPO   Aspiration Precautions: NPO and Strict aspiration precautions   General Precautions: Standard, NPO, fall, respiratory  Communication strategies:  go to room if call light pushed    History:     Past Medical History:   Diagnosis Date    CHF (congestive heart failure)     COPD (chronic obstructive pulmonary disease)     Dementia     Hypertension        Past Surgical History:   Procedure Laterality Date    dementia         Social History: Patient lives with Assisted Nursing facility.    Modified Barium Swallow: None in EPIC    Chest X-Rays: Right lung predominant interstitial changes may represent asymmetric pulmonary edema or an atypical infectious process.    Prior diet: Unknown.    Subjective     You hurt me. I feel bad (perseveration)    Pain/Comfort:  ·  loose tooth on mandible causing pain    Objective:     Oral Musculature Evaluation  · Oral Musculature: unable to assess due to poor participation/comprehension  · Dentition: scattered dentition  · Secretion Management: problems swallowing secretions  · Mucosal Quality: dry  · Mandibular Strength and Mobility: WFL  · Oral Labial Strength and Mobility:  (closed mouth on ice chip, unable d/t poor particpation)  · Lingual Strength and Mobility:  (Unable to assess d/t poor Pt participation)  · Velar Elevation:  (Unable to assess d/t poor Pt participation)  · Buccal Strength and Mobility:  (Unable to assess d/t poor Pt participation)  · Volitional Cough: weak, unable to clear saliva  · Volitional Swallow:  (Unable to assess 2/2 poor comprehension)  · Voice Prior to PO Intake: wet upon entrance. Cleared after spontanous cough    Bedside Swallow Eval:   Consistencies Assessed:  · Thin  Device To use with inhaler.   • metoPROLOL succinate (TOPROL-XL) 50 MG 24 hr tablet Take 1 tablet by mouth daily.   • olmesartan (BENICAR) 40 MG tablet Take 1 tablet by mouth daily.   • Omega-3 Fatty Acids (OMEGA 3 PO) Take 1 capsule by mouth daily.    • blood glucose meter Test blood sugar 4 times daily as directed. Diagnosis: E11.9. Meter: Insurance Preference   • blood glucose lancets Test blood sugar 4 times daily as directed. Diagnosis: E11.9. Meter:Insurance Preference   • fenofibrate 160 MG tablet TAKE 1 TABLET DAILY   • SYNTHROID 112 MCG tablet TAKE 1 TABLET DAILY   • aspirin 81 MG tablet Take 1 tablet by mouth daily.   • Insulin Pen Needle (B-D U/F PEN NEEDLE 5/16\") 31G X 8 MM Misc Use to inject insulin 8 times daily. Remove needle cover(s) to expose needle before injecting.   • atorvastatin (LIPITOR) 20 MG tablet Take 1 tablet by mouth daily.   • cholecalciferol (VITAMIN D3) 1000 UNITS tablet Take 1,000 Units by mouth 2 times daily. With breakfast and supper.   • gabapentin (NEURONTIN) 300 MG capsule Take 1 capsule by mouth 3 times daily. Take 1 tab daily x one week then 1 tab BID for one week then TID   • meloxicam (MOBIC) 15 MG tablet Take 1 tablet by mouth daily.     No current facility-administered medications for this visit.      ALLERGIES:   Allergen Reactions   • Benadryl [Altaryl] HIVES, RASH, SWELLING and PRURITUS     Respiratory distress.    • Betadine Antibiotic-Moisturize [Bacitracin-Polymyxin B] HIVES and RASH   • Phenobarbital HIVES, RASH and SWELLING     Respiratory distress.    • Phisohex [Hexachlorophene] HIVES and RASH   • Adhesive   (Environmental) Other (See Comments)     Takes skin off when being removed    • Silk Floss Other (See Comments)     Sutures do not hold.  Break down easily       Review of systems reviewed per history of present illness (HPI) otherwise unchanged from previous encounter.    PHYSICAL EXAM:   GENERAL:  Well groomed, no apparent distress.  Normal affect.  Blood  liquids via ice chip x 3     Oral Phase:   · Dry mouth  · Unable to assess - d/t limited trials    Pharyngeal Phase:   · coughing/choking  · throat clearing    Compensatory Strategies  · None    Treatment: Unable d/t level of cognition.    Assessment:     Angel Pineda is a 73 y.o. male with an SLP diagnosis of Dysphagia and Cognitive-Linguistic Impairment.  He presented with continued s/s pharyngeal dysphagia. Rec Pt remain NPO. Will follow for any improvement in cognition/receptiveness for dysphagia treatment.    Goals:    SLP Goals        Problem: SLP Goal    Goal Priority Disciplines Outcome   SLP Goal     SLP Ongoing (interventions implemented as appropriate)   Description:  1. Participate in ongoing assessment of swallow function to determine least restrictive means of nutrition/hydration                     Plan:     · Patient to be seen:  5 x/week   · Plan of Care expires:  06/16/18  · Plan of Care reviewed with:   (Pt unable to comprehend POC)   · SLP Follow-Up:  No       Discharge recommendations:  nursing facility, basic   Barriers to Discharge:  None    Time Tracking:     SLP Treatment Date:   06/13/18  Speech Start Time:  1343  Speech Stop Time:  1401     Speech Total Time (min):  18 min    Billable Minutes: Eval Swallow and Oral Function 18    ZACH Garcia  06/13/2018            pressure 152/83, pulse 83, resp. rate 16, SpO2 97 %.  Examination shows that her incisions are healing nicely.  She has a lot of guarding and barely wants to move her shoulder.    ASSESSMENT:  Right arthroscopic rotator cuff repair.  She is now about 5 weeks postop.  I think she is behind in terms of motion.    PLAN:  I gave her prescription for multiModal pain control including refilling her Norco, gabapentin, and meloxicam.  Continue with therapy.  Follow-up in about 3 weeks.   Gold GaonaPA)

## 2023-03-17 NOTE — CONSULTS
Angel Pineda [id number] is a 73 y.o. male who has been consulted for vancomycin dosing.    The patient has the following labs:     Date Creatinine (mg/dl)    BUN WBC Count   6/8/2018 Estimated Creatinine Clearance: 46.1 mL/min (A) (based on SCr of 1.9 mg/dL (H)). Lab Results   Component Value Date    BUN 98 (H) 06/08/2018     Lab Results   Component Value Date    WBC 13.40 (H) 06/08/2018        Current weight is 108.5 kg (239 lb 3.2 oz)    Pt  received vancomycin 2000 mg x 1 dose on 6/7/18 at 1445.  Vancomycin random level from 6/8/18 at 1500 was 10.8 mg/dL.  Target trough range is 15 -20 mg/dL.   Level was drawn on time and anticipate it is subtherapeutic. Pharmacy will start patient on Vancomycin 1750 mg every 24 hours. Vancomycin trough has been ordered prior to next  on 6/9/18 at 1530 to assess therapy and clearance.    Patient will be followed by pharmacy for changes in renal function, toxicity, and efficacy.  Thank you for allowing us to participate in this patient's care.     Jessica Dougherty, PharmD.  06/08/2018       Yes

## 2025-05-24 NOTE — PLAN OF CARE
[facility]   Department of Internal Medicine   Internal Medicine Residency  MICU Progress Note    Patient:  Len Dorsey 60 y.o. male   MRN: [id number]       Date of Service: 2025   Admission date: 2025 10:40 AM ; Hospital day: 5   ICU day: 18h    Allergy: Patient has no known allergies.    Subjective      overnight CT head was done. No acute intracranial abnormality.  2. 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  3. Mild-to-moderate cerebral volume loss and mild chronic microvascular ischemic changes.    Left arm was swollen and inspected, possible due to IV extravasation of fluids    Today:    Patient was seen and examined at bedside.  He is intubated and sedated with fentanyl. Moves during examination. Currently still on bumex drip and with good urine output seen in marks bag.         Objective   PHYSICAL EXAM  General Appearance: intubated and sedated  HEENT: Normocephalic, atraumatic  Neck: midline trachea  Lung: diminished breath sounds at bilateral bases   Heart: murmur heard and irregular rhythm, normal rate  Abdomen: soft, bowel sounds normal; no masses  Extremities: 2+ pitting edema seen bilaterally. Left arm swelling and edema observed   Musculokeletal: No joint swelling  Neurologic: Mental status: intubated and sedated    CARDIOVASCULAR  Pulse rate range      : Pulse  Av.2  Min: 80  Max: 110  BP range                  : Systolic (24hrs), Av , Min:102 , Max:140   ; Diastolic (24hrs), Av, Min:49, Max:84    Arterial BP       Systolic BP/Diastolic BP & MAP            PULMONARY  Respiration range   : Resp  Av  Min: 18  Max: 37  Pulse Ox range : SpO2  Av.6 %  Min: 95 %  Max: 100 %  Recent Labs     25  0417   PH 7.363 7.427 7.441   PCO2 44.2 36.4 39.1   PO2 38.2* 109.2* 104.2*   HCO3 24.6 23.5 26.0   BE -0.8 -0.7 1.8   O2SAT 69.6* 98.2 97.8   THB 8.6* 8.1* 8.8*        Mechanical  Problem: Patient Care Overview  Goal: Plan of Care Review  Outcome: Ongoing (interventions implemented as appropriate)  Pt on bipap continuously with Q6 duoneb treatments        Comment:       L. pneumophila serogroup 1 antigen not detected.  A negative result does not exclude infection with Leginella pnemophila serogroup 1 nor does   it rule out other microbial-caused respiratory infections of disease caused by other   serogroups of Legionella pneumophila.         STREP PNEUMONIAE ANTIGEN [[id number]] Collected: 05/19/25 1622    Order Status: Completed Specimen: Urine, clean catch Updated: 05/20/25 0915     Source .URINE     Strep pneumo Ag NEGATIVE     Comment:       Presumptive Negative  suggests no current or recent pneumococcal infection. Infection due to Strep pneumoniae   cannot be ruled out since the antigen present in the sample may be below the detection limit   of the test.         Culture, Blood 2 [[id number]] Collected: 05/19/25 1520    Order Status: Completed Specimen: Blood Updated: 05/23/25 1741     Specimen Description .BLOOD     Special Requests          Culture NO GROWTH 4 DAYS    Culture, Blood 1 [[id number]] Collected: 05/19/25 1520    Order Status: Completed Specimen: Blood Updated: 05/23/25 1746     Specimen Description .BLOOD     Special Requests          Culture NO GROWTH 4 DAYS    Culture, Urine [[id number]] Collected: 05/19/25 1100    Order Status: Completed Specimen: Urine, clean catch Updated: 05/20/25 1416     Specimen Description .CLEAN CATCH URINE     Special Requests Site: Urine     Culture NO GROWTH           MRSA scree No components found for: \"MRSACU\"  HSV No results found for: \"HSVSRC\"  FLU No results found for: \"FLUA\" No results found for: \"FLUB\"  COVID-19 Labs:  Lab Results   Component Value Date/Time    COVID19 Not Detected 05/23/2025 12:55 PM     Legionella No results found for: \"LABLEGI\"  C Diff PCR No results found for: \"CDIFPCR\"  Strep pneumo No results found for: \"SPNEUAGU\"  Acid fast No results found for: \"AFBSMEAR\"  Stool No results found for: \"CXST\"  Fungust No results found for: \"FUNGUSSMEAR\"  VRE screen No results found for: \"VRECX\"  Ecoli  O051:H7 No results found for: \"FAEPO379\"  Giardia No results found for: \"GIAAGS\"  Rotavirus No results found for: \"ROTA\"  Fecal leukocytes No results found for: \"FECLEU\"  -----------------------------------------------------  Fecal occult blood: .No results for input(s): \"OCCULTBLDFEC\" in the last 72 hours.  Occult blood screening: No results for input(s): \"OCCBS\" in the last 72 hours.  Occult blood QC: No results for input(s): \"OBQC\" in the last 72 hours.      Medications     Continuous Infusions:   fentaNYL 76 mcg/hr (05/24/25 0573)    bumetanide (BUMEX) 12.5 mg in sodium chloride 0.9 % 125 mL infusion 1 mg/hr (05/24/25 3202)    sodium chloride Stopped (05/22/25 7383)    dextrose       Scheduled Meds:   potassium chloride  40 mEq IntraVENous Once    potassium chloride  40 mEq IntraVENous Once    warfarin  0.5 mg Oral Once    vancomycin  1,500 mg IntraVENous Once    aztreonam  1,000 mg IntraVENous Q12H    vancomycin (VANCOCIN) intermittent dosing (placeholder)   Other RX Placeholder    chlorhexidine  15 mL Mouth/Throat BID    thiamine  250 mg IntraVENous Daily    Followed by    [START ON 5/26/2025] thiamine  100 mg IntraVENous Daily    lactulose  20 g Oral TID    ferric gluconate (FERRLECIT) 125 mg in sodium chloride 0.9 % 100 mL IVPB  125 mg IntraVENous Daily    midodrine  5 mg Oral TID WC    warfarin placeholder: dosing by pharmacy   Oral RX Placeholder    atorvastatin  20 mg Oral Nightly    [Held by provider] bumetanide  2 mg Oral Daily    rifAXIMin  400 mg Oral TID    busPIRone  5 mg Oral BID    sodium chloride flush  5-40 mL IntraVENous 2 times per day    multivitamin  1 tablet Oral Daily    folic acid  1 mg Oral Daily    levETIRAcetam  500 mg IntraVENous Q12H    pantoprazole  40 mg IntraVENous BID     PRN Meds: white petrolatum, sodium chloride flush, sodium chloride, acetaminophen **OR** acetaminophen, melatonin, magnesium sulfate, ondansetron **OR** ondansetron, polyethylene glycol, glucose, dextrose bolus